# Patient Record
Sex: FEMALE | Race: OTHER | HISPANIC OR LATINO
[De-identification: names, ages, dates, MRNs, and addresses within clinical notes are randomized per-mention and may not be internally consistent; named-entity substitution may affect disease eponyms.]

---

## 2017-01-02 ENCOUNTER — RX RENEWAL (OUTPATIENT)
Age: 72
End: 2017-01-02

## 2017-01-06 ENCOUNTER — APPOINTMENT (OUTPATIENT)
Dept: INTERNAL MEDICINE | Facility: CLINIC | Age: 72
End: 2017-01-06

## 2017-02-03 ENCOUNTER — APPOINTMENT (OUTPATIENT)
Dept: INTERNAL MEDICINE | Facility: CLINIC | Age: 72
End: 2017-02-03

## 2017-02-03 VITALS
HEART RATE: 90 BPM | WEIGHT: 163 LBS | DIASTOLIC BLOOD PRESSURE: 80 MMHG | HEIGHT: 66 IN | SYSTOLIC BLOOD PRESSURE: 160 MMHG | BODY MASS INDEX: 26.2 KG/M2 | TEMPERATURE: 97.9 F

## 2017-02-03 VITALS — DIASTOLIC BLOOD PRESSURE: 70 MMHG | SYSTOLIC BLOOD PRESSURE: 150 MMHG

## 2017-02-03 LAB — HBA1C MFR BLD HPLC: 6.6

## 2017-02-06 LAB
CREAT SPEC-SCNC: 55 MG/DL
MICROALBUMIN 24H UR DL<=1MG/L-MCNC: 0.4 MG/DL
MICROALBUMIN/CREAT 24H UR-RTO: 7 UG/MG

## 2017-02-21 ENCOUNTER — MEDICATION RENEWAL (OUTPATIENT)
Age: 72
End: 2017-02-21

## 2017-04-10 ENCOUNTER — RX RENEWAL (OUTPATIENT)
Age: 72
End: 2017-04-10

## 2017-05-01 ENCOUNTER — RX RENEWAL (OUTPATIENT)
Age: 72
End: 2017-05-01

## 2017-05-01 ENCOUNTER — APPOINTMENT (OUTPATIENT)
Dept: INTERNAL MEDICINE | Facility: CLINIC | Age: 72
End: 2017-05-01

## 2017-05-12 ENCOUNTER — NON-APPOINTMENT (OUTPATIENT)
Age: 72
End: 2017-05-12

## 2017-05-12 ENCOUNTER — APPOINTMENT (OUTPATIENT)
Dept: INTERNAL MEDICINE | Facility: CLINIC | Age: 72
End: 2017-05-12

## 2017-05-12 VITALS
BODY MASS INDEX: 26.2 KG/M2 | HEIGHT: 66 IN | TEMPERATURE: 98 F | HEART RATE: 96 BPM | SYSTOLIC BLOOD PRESSURE: 170 MMHG | OXYGEN SATURATION: 97 % | WEIGHT: 163 LBS | DIASTOLIC BLOOD PRESSURE: 80 MMHG

## 2017-05-12 LAB — HBA1C MFR BLD HPLC: 8.1

## 2017-05-15 LAB
ALBUMIN SERPL ELPH-MCNC: 5 G/DL
ALP BLD-CCNC: 43 U/L
ALT SERPL-CCNC: 30 U/L
ANION GAP SERPL CALC-SCNC: 17 MMOL/L
AST SERPL-CCNC: 24 U/L
BASOPHILS # BLD AUTO: 0.01 K/UL
BASOPHILS NFR BLD AUTO: 0.2 %
BILIRUB SERPL-MCNC: 0.2 MG/DL
BUN SERPL-MCNC: 20 MG/DL
CALCIUM SERPL-MCNC: 10.4 MG/DL
CHLORIDE SERPL-SCNC: 98 MMOL/L
CO2 SERPL-SCNC: 25 MMOL/L
CREAT SERPL-MCNC: 0.88 MG/DL
EOSINOPHIL # BLD AUTO: 0.16 K/UL
EOSINOPHIL NFR BLD AUTO: 2.8 %
GLUCOSE SERPL-MCNC: 95 MG/DL
HCT VFR BLD CALC: 39.5 %
HGB BLD-MCNC: 12.7 G/DL
IMM GRANULOCYTES NFR BLD AUTO: 0.2 %
LYMPHOCYTES # BLD AUTO: 2.71 K/UL
LYMPHOCYTES NFR BLD AUTO: 46.6 %
MAN DIFF?: NORMAL
MCHC RBC-ENTMCNC: 25.6 PG
MCHC RBC-ENTMCNC: 32.2 GM/DL
MCV RBC AUTO: 79.5 FL
MONOCYTES # BLD AUTO: 0.55 K/UL
MONOCYTES NFR BLD AUTO: 9.5 %
NEUTROPHILS # BLD AUTO: 2.37 K/UL
NEUTROPHILS NFR BLD AUTO: 40.7 %
PLATELET # BLD AUTO: 313 K/UL
POTASSIUM SERPL-SCNC: 4.6 MMOL/L
PROT SERPL-MCNC: 8.4 G/DL
RBC # BLD: 4.97 M/UL
RBC # FLD: 15.9 %
SODIUM SERPL-SCNC: 140 MMOL/L
WBC # FLD AUTO: 5.81 K/UL

## 2017-06-12 ENCOUNTER — RX RENEWAL (OUTPATIENT)
Age: 72
End: 2017-06-12

## 2017-07-05 ENCOUNTER — RX RENEWAL (OUTPATIENT)
Age: 72
End: 2017-07-05

## 2017-07-14 ENCOUNTER — RESULT REVIEW (OUTPATIENT)
Age: 72
End: 2017-07-14

## 2017-07-14 ENCOUNTER — APPOINTMENT (OUTPATIENT)
Dept: OBGYN | Facility: CLINIC | Age: 72
End: 2017-07-14

## 2017-07-20 ENCOUNTER — FORM ENCOUNTER (OUTPATIENT)
Age: 72
End: 2017-07-20

## 2017-07-21 ENCOUNTER — OUTPATIENT (OUTPATIENT)
Dept: OUTPATIENT SERVICES | Facility: HOSPITAL | Age: 72
LOS: 1 days | End: 2017-07-21
Payer: MEDICARE

## 2017-07-21 ENCOUNTER — APPOINTMENT (OUTPATIENT)
Dept: ULTRASOUND IMAGING | Facility: IMAGING CENTER | Age: 72
End: 2017-07-21

## 2017-07-21 ENCOUNTER — APPOINTMENT (OUTPATIENT)
Dept: CARDIOLOGY | Facility: CLINIC | Age: 72
End: 2017-07-21

## 2017-07-21 ENCOUNTER — NON-APPOINTMENT (OUTPATIENT)
Age: 72
End: 2017-07-21

## 2017-07-21 ENCOUNTER — APPOINTMENT (OUTPATIENT)
Dept: MAMMOGRAPHY | Facility: IMAGING CENTER | Age: 72
End: 2017-07-21

## 2017-07-21 VITALS — OXYGEN SATURATION: 98 % | HEART RATE: 92 BPM | DIASTOLIC BLOOD PRESSURE: 86 MMHG | SYSTOLIC BLOOD PRESSURE: 152 MMHG

## 2017-07-21 DIAGNOSIS — Z00.8 ENCOUNTER FOR OTHER GENERAL EXAMINATION: ICD-10-CM

## 2017-07-21 PROCEDURE — 77063 BREAST TOMOSYNTHESIS BI: CPT

## 2017-07-21 PROCEDURE — 76641 ULTRASOUND BREAST COMPLETE: CPT

## 2017-07-21 PROCEDURE — 77067 SCR MAMMO BI INCL CAD: CPT

## 2017-08-11 ENCOUNTER — APPOINTMENT (OUTPATIENT)
Dept: CV DIAGNOSITCS | Facility: HOSPITAL | Age: 72
End: 2017-08-11

## 2017-08-11 ENCOUNTER — APPOINTMENT (OUTPATIENT)
Dept: CV DIAGNOSTICS | Facility: HOSPITAL | Age: 72
End: 2017-08-11

## 2017-08-11 ENCOUNTER — OUTPATIENT (OUTPATIENT)
Dept: OUTPATIENT SERVICES | Facility: HOSPITAL | Age: 72
LOS: 1 days | End: 2017-08-11
Payer: MEDICARE

## 2017-08-11 DIAGNOSIS — R06.02 SHORTNESS OF BREATH: ICD-10-CM

## 2017-08-11 PROCEDURE — A9500: CPT

## 2017-08-11 PROCEDURE — 93306 TTE W/DOPPLER COMPLETE: CPT

## 2017-08-11 PROCEDURE — 93017 CV STRESS TEST TRACING ONLY: CPT

## 2017-08-11 PROCEDURE — 78452 HT MUSCLE IMAGE SPECT MULT: CPT | Mod: 26

## 2017-08-11 PROCEDURE — 78452 HT MUSCLE IMAGE SPECT MULT: CPT

## 2017-08-11 PROCEDURE — 93016 CV STRESS TEST SUPVJ ONLY: CPT

## 2017-08-11 PROCEDURE — 93306 TTE W/DOPPLER COMPLETE: CPT | Mod: 26

## 2017-08-11 PROCEDURE — 93018 CV STRESS TEST I&R ONLY: CPT

## 2017-09-18 ENCOUNTER — RX RENEWAL (OUTPATIENT)
Age: 72
End: 2017-09-18

## 2017-09-29 ENCOUNTER — NON-APPOINTMENT (OUTPATIENT)
Age: 72
End: 2017-09-29

## 2017-09-29 ENCOUNTER — APPOINTMENT (OUTPATIENT)
Dept: CARDIOLOGY | Facility: CLINIC | Age: 72
End: 2017-09-29
Payer: MEDICARE

## 2017-09-29 VITALS
WEIGHT: 157 LBS | DIASTOLIC BLOOD PRESSURE: 89 MMHG | HEART RATE: 95 BPM | OXYGEN SATURATION: 96 % | BODY MASS INDEX: 25.23 KG/M2 | SYSTOLIC BLOOD PRESSURE: 168 MMHG | HEIGHT: 66 IN

## 2017-09-29 PROCEDURE — 99215 OFFICE O/P EST HI 40 MIN: CPT

## 2017-09-29 PROCEDURE — 93000 ELECTROCARDIOGRAM COMPLETE: CPT

## 2017-10-20 ENCOUNTER — APPOINTMENT (OUTPATIENT)
Dept: CARDIOLOGY | Facility: CLINIC | Age: 72
End: 2017-10-20

## 2017-10-24 ENCOUNTER — MEDICATION RENEWAL (OUTPATIENT)
Age: 72
End: 2017-10-24

## 2017-11-07 ENCOUNTER — RX RENEWAL (OUTPATIENT)
Age: 72
End: 2017-11-07

## 2017-11-24 ENCOUNTER — APPOINTMENT (OUTPATIENT)
Dept: INTERNAL MEDICINE | Facility: CLINIC | Age: 72
End: 2017-11-24
Payer: MEDICARE

## 2017-11-24 ENCOUNTER — LABORATORY RESULT (OUTPATIENT)
Age: 72
End: 2017-11-24

## 2017-11-24 VITALS
DIASTOLIC BLOOD PRESSURE: 70 MMHG | HEIGHT: 66 IN | WEIGHT: 158 LBS | BODY MASS INDEX: 25.39 KG/M2 | TEMPERATURE: 98 F | SYSTOLIC BLOOD PRESSURE: 158 MMHG

## 2017-11-24 DIAGNOSIS — Z87.898 PERSONAL HISTORY OF OTHER SPECIFIED CONDITIONS: ICD-10-CM

## 2017-11-24 DIAGNOSIS — M62.838 OTHER MUSCLE SPASM: ICD-10-CM

## 2017-11-24 DIAGNOSIS — R51 HEADACHE: ICD-10-CM

## 2017-11-24 LAB — HBA1C MFR BLD HPLC: 6.4

## 2017-11-24 PROCEDURE — 36415 COLL VENOUS BLD VENIPUNCTURE: CPT

## 2017-11-24 PROCEDURE — 90662 IIV NO PRSV INCREASED AG IM: CPT

## 2017-11-24 PROCEDURE — 83036 HEMOGLOBIN GLYCOSYLATED A1C: CPT | Mod: QW

## 2017-11-24 PROCEDURE — G0439: CPT

## 2017-11-24 PROCEDURE — G0008: CPT

## 2017-11-29 LAB
25(OH)D3 SERPL-MCNC: 24.8 NG/ML
APPEARANCE: CLEAR
BILIRUBIN URINE: NEGATIVE
BLOOD URINE: NEGATIVE
CHOLEST SERPL-MCNC: 164 MG/DL
CHOLEST/HDLC SERPL: 5.5 RATIO
COLOR: YELLOW
CREAT SPEC-SCNC: 53 MG/DL
GLUCOSE QUALITATIVE U: NEGATIVE MG/DL
HDLC SERPL-MCNC: 30 MG/DL
KETONES URINE: NEGATIVE
LDLC SERPL CALC-MCNC: 105 MG/DL
LEUKOCYTE ESTERASE URINE: NEGATIVE
MICROALBUMIN 24H UR DL<=1MG/L-MCNC: 0.4 MG/DL
MICROALBUMIN/CREAT 24H UR-RTO: 8 MG/G
NITRITE URINE: NEGATIVE
PH URINE: 5.5
PROTEIN URINE: NEGATIVE MG/DL
SPECIFIC GRAVITY URINE: 1.01
TRIGL SERPL-MCNC: 147 MG/DL
TSH SERPL-ACNC: 1.41 UIU/ML
UROBILINOGEN URINE: NEGATIVE MG/DL
VIT B12 SERPL-MCNC: 833 PG/ML

## 2017-12-08 LAB
ALBUMIN SERPL ELPH-MCNC: 4.7 G/DL
ALP BLD-CCNC: 33 U/L
ALT SERPL-CCNC: 21 U/L
ANION GAP SERPL CALC-SCNC: 22 MMOL/L
AST SERPL-CCNC: 19 U/L
BASOPHILS # BLD AUTO: 0.02 K/UL
BASOPHILS NFR BLD AUTO: 0.4 %
BILIRUB SERPL-MCNC: 0.2 MG/DL
BUN SERPL-MCNC: 20 MG/DL
CALCIUM SERPL-MCNC: 11.2 MG/DL
CHLORIDE SERPL-SCNC: 100 MMOL/L
CO2 SERPL-SCNC: 25 MMOL/L
CREAT SERPL-MCNC: 0.98 MG/DL
EOSINOPHIL # BLD AUTO: 0.2 K/UL
EOSINOPHIL NFR BLD AUTO: 3.8 %
GLUCOSE SERPL-MCNC: 76 MG/DL
HCT VFR BLD CALC: 37.5 %
HGB BLD-MCNC: 12.6 G/DL
IMM GRANULOCYTES NFR BLD AUTO: 0.2 %
LYMPHOCYTES # BLD AUTO: 2.24 K/UL
LYMPHOCYTES NFR BLD AUTO: 42.7 %
MAN DIFF?: NORMAL
MCHC RBC-ENTMCNC: 26.8 PG
MCHC RBC-ENTMCNC: 33.6 GM/DL
MCV RBC AUTO: 79.6 FL
MONOCYTES # BLD AUTO: 0.57 K/UL
MONOCYTES NFR BLD AUTO: 10.9 %
NEUTROPHILS # BLD AUTO: 2.2 K/UL
NEUTROPHILS NFR BLD AUTO: 42 %
PLATELET # BLD AUTO: 309 K/UL
POTASSIUM SERPL-SCNC: 4.5 MMOL/L
PROT SERPL-MCNC: 8.3 G/DL
RBC # BLD: 4.71 M/UL
RBC # FLD: 15.4 %
SODIUM SERPL-SCNC: 142 MMOL/L
WBC # FLD AUTO: 5.24 K/UL

## 2017-12-18 ENCOUNTER — RX RENEWAL (OUTPATIENT)
Age: 72
End: 2017-12-18

## 2018-01-02 ENCOUNTER — RX RENEWAL (OUTPATIENT)
Age: 73
End: 2018-01-02

## 2018-01-10 ENCOUNTER — RX RENEWAL (OUTPATIENT)
Age: 73
End: 2018-01-10

## 2018-03-19 ENCOUNTER — LABORATORY RESULT (OUTPATIENT)
Age: 73
End: 2018-03-19

## 2018-03-19 ENCOUNTER — APPOINTMENT (OUTPATIENT)
Dept: INTERNAL MEDICINE | Facility: CLINIC | Age: 73
End: 2018-03-19
Payer: MEDICARE

## 2018-03-19 VITALS
OXYGEN SATURATION: 98 % | BODY MASS INDEX: 25.39 KG/M2 | DIASTOLIC BLOOD PRESSURE: 70 MMHG | WEIGHT: 158 LBS | HEART RATE: 91 BPM | TEMPERATURE: 97.6 F | SYSTOLIC BLOOD PRESSURE: 130 MMHG | HEIGHT: 66 IN

## 2018-03-19 LAB — HBA1C MFR BLD HPLC: 6.2

## 2018-03-19 PROCEDURE — 83036 HEMOGLOBIN GLYCOSYLATED A1C: CPT | Mod: QW

## 2018-03-19 PROCEDURE — 99214 OFFICE O/P EST MOD 30 MIN: CPT | Mod: 25

## 2018-03-19 PROCEDURE — 36415 COLL VENOUS BLD VENIPUNCTURE: CPT

## 2018-03-22 LAB
24R-OH-CALCIDIOL SERPL-MCNC: 30.5 PG/ML
25(OH)D3 SERPL-MCNC: 21.7 NG/ML
ANION GAP SERPL CALC-SCNC: 21 MMOL/L
BUN SERPL-MCNC: 15 MG/DL
CA-I SERPL-SCNC: 1.32 MMOL/L
CALCIUM SERPL-MCNC: 11.5 MG/DL
CALCIUM SERPL-MCNC: 11.5 MG/DL
CHLORIDE SERPL-SCNC: 98 MMOL/L
CO2 SERPL-SCNC: 23 MMOL/L
CREAT SERPL-MCNC: 0.95 MG/DL
CREAT SPEC-SCNC: 44 MG/DL
GLUCOSE SERPL-MCNC: 67 MG/DL
MICROALBUMIN 24H UR DL<=1MG/L-MCNC: 0.7 MG/DL
MICROALBUMIN/CREAT 24H UR-RTO: 16 MG/G
PARATHYROID HORMONE INTACT: 28 PG/ML
POTASSIUM SERPL-SCNC: 4.7 MMOL/L
SODIUM SERPL-SCNC: 142 MMOL/L

## 2018-03-23 LAB — PTH RELATED PROT SERPL-MCNC: <1.1 PMOL/L

## 2018-04-10 ENCOUNTER — FORM ENCOUNTER (OUTPATIENT)
Age: 73
End: 2018-04-10

## 2018-04-11 ENCOUNTER — APPOINTMENT (OUTPATIENT)
Dept: RADIOLOGY | Facility: IMAGING CENTER | Age: 73
End: 2018-04-11
Payer: MEDICARE

## 2018-04-11 ENCOUNTER — OUTPATIENT (OUTPATIENT)
Dept: OUTPATIENT SERVICES | Facility: HOSPITAL | Age: 73
LOS: 1 days | End: 2018-04-11
Payer: MEDICARE

## 2018-04-11 DIAGNOSIS — E83.52 HYPERCALCEMIA: ICD-10-CM

## 2018-04-11 PROCEDURE — 77080 DXA BONE DENSITY AXIAL: CPT

## 2018-04-11 PROCEDURE — 77080 DXA BONE DENSITY AXIAL: CPT | Mod: 26

## 2018-05-21 ENCOUNTER — RX RENEWAL (OUTPATIENT)
Age: 73
End: 2018-05-21

## 2018-05-25 ENCOUNTER — RX RENEWAL (OUTPATIENT)
Age: 73
End: 2018-05-25

## 2018-06-18 ENCOUNTER — APPOINTMENT (OUTPATIENT)
Dept: INTERNAL MEDICINE | Facility: CLINIC | Age: 73
End: 2018-06-18
Payer: MEDICARE

## 2018-06-18 VITALS
OXYGEN SATURATION: 96 % | BODY MASS INDEX: 25.71 KG/M2 | SYSTOLIC BLOOD PRESSURE: 154 MMHG | WEIGHT: 160 LBS | HEIGHT: 66 IN | HEART RATE: 85 BPM | TEMPERATURE: 97.6 F | DIASTOLIC BLOOD PRESSURE: 74 MMHG

## 2018-06-18 PROCEDURE — 99214 OFFICE O/P EST MOD 30 MIN: CPT | Mod: 25

## 2018-06-18 PROCEDURE — 36415 COLL VENOUS BLD VENIPUNCTURE: CPT

## 2018-06-18 NOTE — HISTORY OF PRESENT ILLNESS
[FreeTextEntry1] : \par DM- doing better \par -checking readings   , no hypoglycemic episode , Ophth visit was 2017 , no retinopathy .On Lantus 22 units with Po medications. compliant with diet and medications.\par -walking in house , wants to but needs  to drive her to park where she can walk , she likes going out to park feels low mood walking in her neighborhood , has been eating a lot staying home \par \par Hyperlipidemia- on atorvastatin denies myalgias.\par \par Hypertension- on current medications . off HCTZ 3/2018 to check BP today \par \par H/o hypercalcemia- monitoring calcium intake , stopped Po ca supplements last ca was normal \par - off hCTZ \par

## 2018-06-18 NOTE — ASSESSMENT
[FreeTextEntry1] : hypercalcemia- \par -hctz was discontinued last visit \par repeat CA ++, intact PTH \par -bone density ordered \par -hydration advised \par \par Diabetes type 2 , on insulin and oral hypoglycemic -was noncompliant with diet , poc HGBAIC 3/2018 6.2\par - get AIC \par -  diet low in carbs, , continue current medications. Monitor Accu-Cheks daily, ophthalmology- due will schedule apt , Monitor for signs of hypoglycemia. \par d/w patient to eat a low carbohydrate diet Continue 1800 kcal ADA diet, avoid rice, pasta, sugar, sweet, soda, juices,start exercise daily 30 minutes \par \par Hypertension- 160/84\par -HCTZ was discontinued last 3/2018 \par check lytes \par -change enalapril 10 2 tab in am and 1 in pm \par - cardio consult reviewed - stress test negative  Controlled, continue current medications, low sodium-DASH diet.\par -f/u 3 months check BP \par \par h/o hep c s/p interferon rx , RNA negative \par \par Hyperlipidemia\par d/c fibric acid , advised low carb diet , avoid fried foods, continue current medications,\par Low-fat diet, avoid red meat, cheese, butter, peanuts and exercise daily for 40 minutes.\par \par Health maintenance\par flu vaccine given 2017\par colonoscope -8/2014 , \par Mammo - 7/2017 \par Pap - 7/2017 \par Tetanus vaccine -2013\par Pneumovax -2011, Prevnar 13 2/2015 , Pneumovax 23 given 2016 \par dermatology - 2015 for skin check \par zoster vaccine offered pt will check with insurance company and make appointment. \par bone density ordered

## 2018-06-18 NOTE — REVIEW OF SYSTEMS
[Negative] : Gastrointestinal [Chest Pain] : no chest pain [Palpitations] : no palpitations [Lower Ext Edema] : no lower extremity edema

## 2018-06-18 NOTE — DATA REVIEWED
[FreeTextEntry1] : 3/2018\par Microalbumin/Creatinine Ratio 16 mg/g \par Calcium, Serum 11.5 mg/dL \par Intact PTH 28 pg/mL \par  POCT Hemoglobin A1C 6.2

## 2018-06-19 LAB
CA-I SERPL-SCNC: 1.31 MMOL/L
CALCIUM SERPL-MCNC: 9.8 MG/DL
CALCIUM SERPL-MCNC: 9.8 MG/DL
CHOLEST SERPL-MCNC: 146 MG/DL
CHOLEST/HDLC SERPL: 4.3 RATIO
CREAT SPEC-SCNC: 83 MG/DL
HBA1C MFR BLD HPLC: 5.8 %
HDLC SERPL-MCNC: 34 MG/DL
LDLC SERPL CALC-MCNC: 83 MG/DL
MICROALBUMIN 24H UR DL<=1MG/L-MCNC: 3 MG/DL
MICROALBUMIN/CREAT 24H UR-RTO: 36 MG/G
PARATHYROID HORMONE INTACT: 43 PG/ML
TRIGL SERPL-MCNC: 147 MG/DL

## 2018-08-31 ENCOUNTER — RX RENEWAL (OUTPATIENT)
Age: 73
End: 2018-08-31

## 2018-09-11 ENCOUNTER — FORM ENCOUNTER (OUTPATIENT)
Age: 73
End: 2018-09-11

## 2018-09-12 ENCOUNTER — OUTPATIENT (OUTPATIENT)
Dept: OUTPATIENT SERVICES | Facility: HOSPITAL | Age: 73
LOS: 1 days | End: 2018-09-12
Payer: MEDICARE

## 2018-09-12 ENCOUNTER — APPOINTMENT (OUTPATIENT)
Dept: ULTRASOUND IMAGING | Facility: IMAGING CENTER | Age: 73
End: 2018-09-12
Payer: MEDICARE

## 2018-09-12 ENCOUNTER — APPOINTMENT (OUTPATIENT)
Dept: MAMMOGRAPHY | Facility: IMAGING CENTER | Age: 73
End: 2018-09-12
Payer: MEDICARE

## 2018-09-12 DIAGNOSIS — Z00.8 ENCOUNTER FOR OTHER GENERAL EXAMINATION: ICD-10-CM

## 2018-09-12 PROCEDURE — 77063 BREAST TOMOSYNTHESIS BI: CPT | Mod: 26

## 2018-09-12 PROCEDURE — 76641 ULTRASOUND BREAST COMPLETE: CPT | Mod: 26,50

## 2018-09-12 PROCEDURE — 77067 SCR MAMMO BI INCL CAD: CPT | Mod: 26

## 2018-09-12 PROCEDURE — 77067 SCR MAMMO BI INCL CAD: CPT

## 2018-09-12 PROCEDURE — 77063 BREAST TOMOSYNTHESIS BI: CPT

## 2018-09-12 PROCEDURE — 76641 ULTRASOUND BREAST COMPLETE: CPT

## 2018-09-17 ENCOUNTER — APPOINTMENT (OUTPATIENT)
Dept: INTERNAL MEDICINE | Facility: CLINIC | Age: 73
End: 2018-09-17
Payer: MEDICARE

## 2018-09-17 VITALS
DIASTOLIC BLOOD PRESSURE: 80 MMHG | SYSTOLIC BLOOD PRESSURE: 170 MMHG | BODY MASS INDEX: 25.71 KG/M2 | HEART RATE: 87 BPM | WEIGHT: 160 LBS | HEIGHT: 66 IN | TEMPERATURE: 98.1 F | OXYGEN SATURATION: 98 %

## 2018-09-17 LAB — HBA1C MFR BLD HPLC: 6.5

## 2018-09-17 PROCEDURE — G0008: CPT

## 2018-09-17 PROCEDURE — 83036 HEMOGLOBIN GLYCOSYLATED A1C: CPT | Mod: QW

## 2018-09-17 PROCEDURE — 99214 OFFICE O/P EST MOD 30 MIN: CPT | Mod: 25

## 2018-09-17 PROCEDURE — 90662 IIV NO PRSV INCREASED AG IM: CPT

## 2018-09-17 RX ORDER — CLOTRIMAZOLE AND BETAMETHASONE DIPROPIONATE 10; .5 MG/G; MG/G
1-0.05 CREAM TOPICAL TWICE DAILY
Qty: 1 | Refills: 0 | Status: DISCONTINUED | COMMUNITY
Start: 2018-09-17 | End: 2018-09-17

## 2018-09-17 NOTE — HISTORY OF PRESENT ILLNESS
[Spouse] : spouse [FreeTextEntry1] : \par DM- doing better \par -checking readings   , no hypoglycemic episode , Ophth visit was 8/2018  , no retinopathy .On Lantus 22 units with Po medications. compliant with diet and medications.\par -walking in house , wants to but needs  to drive her to park where she can walk , she likes going out to park feels low mood walking in her neighborhood , has been eating a lot staying home \par \par Hyperlipidemia- on atorvastatin denies myalgias.\par \par Hypertension- on current medications . off HCTZ 3/2018 to check BP today \par \par H/o hypercalcemia- monitoring calcium intake , stopped Po ca supplements last ca was normal \par - off hCTZ \par

## 2018-09-17 NOTE — ASSESSMENT
[FreeTextEntry1] : hypercalcemia- \par -hctz was discontinued last visit \par repeat CA ++, intact PTH stable \par -bone density 4/2018 nl \par -hydration advised \par \par Diabetes type 2 , on insulin and oral hypoglycemic -was noncompliant with diet , poc HGBAIC today- 6.5\par -  diet low in carbs, , continue current medications. Monitor Accu-Cheks daily, ophthalmology- due will schedule apt , Monitor for signs of hypoglycemia. \par d/w patient to eat a low carbohydrate diet Continue 1800 kcal ADA diet, avoid rice, pasta, sugar, sweet, soda, juices,start exercise daily 30 minutes \par \par Hypertension- 160/84- still elevated - did not cahneg medication as requested last visit \par - change to enalapril 20 po bid \par -HCTZ was discontinued last 3/2018 \par - cardio consult reviewed - stress test negative  Controlled, continue current medications, low sodium-DASH diet.\par -f/u 3 months check BP \par \par h/o hep c s/p interferon rx , RNA negative \par \par Hyperlipidemia\par d/c fibric acid , advised low carb diet , avoid fried foods, continue current medications,\par Low-fat diet, avoid red meat, cheese, butter, peanuts and exercise daily for 40 minutes.\par \par Health maintenance\par flu vaccine given today \par colonoscope -8/2014 , \par Mammo - 9/2018 \par Pap - 7/2017 \par Tetanus vaccine -2013\par Pneumovax -2011, Prevnar 13 2/2015 , Pneumovax 23 given 2016 \par dermatology - 2015 for skin check \par zoster vaccine offered pt will check with insurance company and make appointment. \par bone density 4/2018 normal

## 2018-09-18 LAB
CREAT SPEC-SCNC: 37 MG/DL
MICROALBUMIN 24H UR DL<=1MG/L-MCNC: 3.7 MG/DL
MICROALBUMIN/CREAT 24H UR-RTO: 101 MG/G

## 2018-10-18 ENCOUNTER — RX RENEWAL (OUTPATIENT)
Age: 73
End: 2018-10-18

## 2018-10-19 ENCOUNTER — RX RENEWAL (OUTPATIENT)
Age: 73
End: 2018-10-19

## 2018-11-06 ENCOUNTER — APPOINTMENT (OUTPATIENT)
Dept: DERMATOLOGY | Facility: CLINIC | Age: 73
End: 2018-11-06
Payer: MEDICARE

## 2018-11-06 VITALS
SYSTOLIC BLOOD PRESSURE: 140 MMHG | DIASTOLIC BLOOD PRESSURE: 84 MMHG | BODY MASS INDEX: 25.71 KG/M2 | WEIGHT: 160 LBS | HEIGHT: 66 IN

## 2018-11-06 PROCEDURE — 99203 OFFICE O/P NEW LOW 30 MIN: CPT

## 2018-11-25 ENCOUNTER — RX RENEWAL (OUTPATIENT)
Age: 73
End: 2018-11-25

## 2018-12-11 ENCOUNTER — RX RENEWAL (OUTPATIENT)
Age: 73
End: 2018-12-11

## 2018-12-17 ENCOUNTER — APPOINTMENT (OUTPATIENT)
Dept: INTERNAL MEDICINE | Facility: CLINIC | Age: 73
End: 2018-12-17
Payer: MEDICARE

## 2018-12-17 ENCOUNTER — RX RENEWAL (OUTPATIENT)
Age: 73
End: 2018-12-17

## 2018-12-17 VITALS
BODY MASS INDEX: 25.71 KG/M2 | OXYGEN SATURATION: 97 % | TEMPERATURE: 99.2 F | SYSTOLIC BLOOD PRESSURE: 160 MMHG | HEART RATE: 104 BPM | HEIGHT: 66 IN | DIASTOLIC BLOOD PRESSURE: 74 MMHG | WEIGHT: 160 LBS

## 2018-12-17 PROCEDURE — G0439: CPT

## 2018-12-17 PROCEDURE — G0444 DEPRESSION SCREEN ANNUAL: CPT | Mod: 59

## 2018-12-17 RX ORDER — ENALAPRIL MALEATE 10 MG/1
10 TABLET ORAL TWICE DAILY
Qty: 180 | Refills: 0 | Status: DISCONTINUED | COMMUNITY
Start: 2017-11-24 | End: 2018-12-17

## 2018-12-17 NOTE — ASSESSMENT
[FreeTextEntry1] : URTI- getting better on Doxy \par -increase fluids intake \par -  flonase 1 spray each nostril twice daily x 1week \par -xyzal 5 mg at bed time - if not covered take zyrtec 10 qhs \par - rtc if no improvement or go to ER\par \par hypercalcemia- \par repeat CA ++, intact PTH stable \par -bone density 4/2018 nl \par -hydration advised \par \par Diabetes type 2 , on insulin and oral hypoglycemic -was noncompliant with diet , poc HGBAIC today- 6.5\par -  diet low in carbs, , continue current medications. Monitor Accu-Cheks daily, ophthalmology- due will schedule apt , Monitor for signs of hypoglycemia. \par d/w patient to eat a low carbohydrate diet Continue 1800 kcal ADA diet, avoid rice, pasta, sugar, sweet, soda, juices,start exercise daily 30 minutes \par \par Hypertension- 160/84- still elevated -changed medication as requested last visit \par - on enalapril 20 po bid \par -HCTZ was discontinued last 3/2018 \par - cardio consult reviewed - stress test negative  Controlled, continue current medications, low sodium-DASH diet.\par -f/u 1 months check BP \par \par h/o hep c s/p interferon rx , RNA negative \par \par Hyperlipidemia\par d/c fibric acid , advised low carb diet , avoid fried foods, continue current medications,\par Low-fat diet, avoid red meat, cheese, butter, peanuts and exercise daily for 40 minutes.\par \par Health maintenance\par flu vaccine given 9/2018\par colonoscope -8/2014 , \par Mammo - 9/2018 \par Pap - 7/2017 \par Tetanus vaccine -2013\par Pneumovax -2011, Prevnar 13 2/2015 , Pneumovax 23 given 2016 \par dermatology - 2015 for skin check \par zoster vaccine offered pt will check with insurance company and make appointment. \par bone density 4/2018 normal

## 2018-12-17 NOTE — HEALTH RISK ASSESSMENT
[Good] : ~his/her~  mood as  good [No falls in past year] : Patient reported no falls in the past year [0] : 2) Feeling down, depressed, or hopeless: Not at all (0) [None] : None [With Significant Other] : lives with significant other [Unemployed] : unemployed [] :  [Feels Safe at Home] : Feels safe at home [Fully functional (bathing, dressing, toileting, transferring, walking, feeding)] : Fully functional (bathing, dressing, toileting, transferring, walking, feeding) [Fully functional (using the telephone, shopping, preparing meals, housekeeping, doing laundry, using] : Fully functional and needs no help or supervision to perform IADLs (using the telephone, shopping, preparing meals, housekeeping, doing laundry, using transportation, managing medications and managing finances) [] : No [[id number]] : 0 [Reports changes in hearing] : Reports no changes in hearing [Reports changes in vision] : Reports no changes in vision [Reports changes in dental health] : Reports no changes in dental health [HepatitisCDate] : 6/27/2015  [HepatitisCComments] : + hep C

## 2018-12-17 NOTE — HISTORY OF PRESENT ILLNESS
[Spouse] : spouse [de-identified] : came back from [place] 10 day ago was sick with URTI coughing - [facility] was dx as [facility]- rx with flonase and doxycycline 1 more day left feeling better cough still present \par  [FreeTextEntry1] : \par DM- doing better \par -checking readings   , no hypoglycemic episode , Ophth visit was 8/2018  , no retinopathy .On Lantus 22 units with Po medications. compliant with diet and medications.\par -walking in house , wants to but needs  to drive her to park where she can walk , she likes going out to park feels low mood walking in her neighborhood , has been eating a lot staying home \par \par Hyperlipidemia- on atorvastatin denies myalgias.\par \par Hypertension- on current medications enalapril 20 BID  . off HCTZ 3/2018 to check BP today \par \par H/o hypercalcemia- monitoring calcium intake , stopped Po ca supplements last ca was normal \par - off hCTZ \par

## 2019-01-17 ENCOUNTER — MEDICATION RENEWAL (OUTPATIENT)
Age: 74
End: 2019-01-17

## 2019-01-28 ENCOUNTER — MEDICATION RENEWAL (OUTPATIENT)
Age: 74
End: 2019-01-28

## 2019-02-04 ENCOUNTER — MEDICATION RENEWAL (OUTPATIENT)
Age: 74
End: 2019-02-04

## 2019-02-04 ENCOUNTER — APPOINTMENT (OUTPATIENT)
Dept: INTERNAL MEDICINE | Facility: CLINIC | Age: 74
End: 2019-02-04

## 2019-02-19 LAB
25(OH)D3 SERPL-MCNC: 30.6 NG/ML
AFP-TM SERPL-MCNC: 2.8 NG/ML
ALBUMIN SERPL ELPH-MCNC: 4.5 G/DL
ALP BLD-CCNC: 39 U/L
ALT SERPL-CCNC: 16 U/L
ANION GAP SERPL CALC-SCNC: 9 MMOL/L
APPEARANCE: CLEAR
AST SERPL-CCNC: 24 U/L
BASOPHILS # BLD AUTO: 0.01 K/UL
BASOPHILS NFR BLD AUTO: 0.2 %
BILIRUB SERPL-MCNC: 0.4 MG/DL
BILIRUBIN URINE: NEGATIVE
BLOOD URINE: NEGATIVE
BUN SERPL-MCNC: 9 MG/DL
CALCIUM SERPL-MCNC: 9.6 MG/DL
CALCIUM SERPL-MCNC: 9.6 MG/DL
CHLORIDE SERPL-SCNC: 104 MMOL/L
CHOLEST SERPL-MCNC: 142 MG/DL
CHOLEST/HDLC SERPL: 4.3 RATIO
CO2 SERPL-SCNC: 27 MMOL/L
COLOR: YELLOW
CREAT SERPL-MCNC: 0.73 MG/DL
CREAT SPEC-SCNC: 45 MG/DL
EOSINOPHIL # BLD AUTO: 0.12 K/UL
EOSINOPHIL NFR BLD AUTO: 2.6 %
ESTIMATED AVERAGE GLUCOSE: 123 MG/DL
GLUCOSE QUALITATIVE U: NEGATIVE MG/DL
GLUCOSE SERPL-MCNC: 73 MG/DL
HBA1C MFR BLD HPLC: 5.9 %
HCT VFR BLD CALC: 37 %
HDLC SERPL-MCNC: 33 MG/DL
HGB BLD-MCNC: 11.6 G/DL
IMM GRANULOCYTES NFR BLD AUTO: 0 %
KETONES URINE: NEGATIVE
LDLC SERPL CALC-MCNC: 87 MG/DL
LEUKOCYTE ESTERASE URINE: NEGATIVE
LYMPHOCYTES # BLD AUTO: 1.96 K/UL
LYMPHOCYTES NFR BLD AUTO: 42.2 %
MAN DIFF?: NORMAL
MCHC RBC-ENTMCNC: 24.4 PG
MCHC RBC-ENTMCNC: 31.4 GM/DL
MCV RBC AUTO: 77.9 FL
MICROALBUMIN 24H UR DL<=1MG/L-MCNC: <1.2 MG/DL
MICROALBUMIN/CREAT 24H UR-RTO: NORMAL
MONOCYTES # BLD AUTO: 0.47 K/UL
MONOCYTES NFR BLD AUTO: 10.1 %
NEUTROPHILS # BLD AUTO: 2.09 K/UL
NEUTROPHILS NFR BLD AUTO: 44.9 %
NITRITE URINE: NEGATIVE
PARATHYROID HORMONE INTACT: 52 PG/ML
PH URINE: 7
PLATELET # BLD AUTO: 315 K/UL
POTASSIUM SERPL-SCNC: 3.9 MMOL/L
PROT SERPL-MCNC: 7.5 G/DL
PROTEIN URINE: NEGATIVE MG/DL
RBC # BLD: 4.75 M/UL
RBC # FLD: 16.9 %
SODIUM SERPL-SCNC: 140 MMOL/L
SPECIFIC GRAVITY URINE: 1.01
TRIGL SERPL-MCNC: 108 MG/DL
TSH SERPL-ACNC: 3.08 UIU/ML
UROBILINOGEN URINE: NEGATIVE MG/DL
VIT B12 SERPL-MCNC: 460 PG/ML
WBC # FLD AUTO: 4.65 K/UL

## 2019-03-06 ENCOUNTER — APPOINTMENT (OUTPATIENT)
Dept: INTERNAL MEDICINE | Facility: CLINIC | Age: 74
End: 2019-03-06
Payer: MEDICARE

## 2019-03-06 VITALS
HEART RATE: 93 BPM | DIASTOLIC BLOOD PRESSURE: 80 MMHG | OXYGEN SATURATION: 98 % | BODY MASS INDEX: 25.39 KG/M2 | TEMPERATURE: 98.1 F | HEIGHT: 66 IN | SYSTOLIC BLOOD PRESSURE: 150 MMHG | WEIGHT: 158 LBS

## 2019-03-06 DIAGNOSIS — J06.9 ACUTE UPPER RESPIRATORY INFECTION, UNSPECIFIED: ICD-10-CM

## 2019-03-06 DIAGNOSIS — R26.89 OTHER ABNORMALITIES OF GAIT AND MOBILITY: ICD-10-CM

## 2019-03-06 DIAGNOSIS — R21 RASH AND OTHER NONSPECIFIC SKIN ERUPTION: ICD-10-CM

## 2019-03-06 PROCEDURE — 99214 OFFICE O/P EST MOD 30 MIN: CPT

## 2019-03-06 NOTE — ASSESSMENT
[FreeTextEntry1] : \par hx hypercalcemia- \par repeat CA ++, intact PTH stable \par -bone density 4/2018 nl \par -hydration advised \par \par Diabetes type 2 , on insulin and oral hypoglycemic -AIC was 5.9 2/2019 better \par -  diet low in carbs, , continue current medications. Monitor Accu-Cheks daily, ophthalmology- due will schedule apt , Monitor for signs of hypoglycemia. \par d/w patient to eat a low carbohydrate diet Continue 1800 kcal ADA diet, avoid rice, pasta, sugar, sweet, soda, juices,start exercise daily 30 minutes \par \par Hypertension- 160/84- still elevated slight improvement \par - on enalapril 20 po bid \par -HCTZ was discontinued last 3/2018 \par - cardio consult reviewed - stress test negative  Controlled, continue current medications, low sodium-DASH diet.\par -f/u 3 months check bp \par \par h/o hep c s/p interferon rx , RNA negative - AFP neg \par \par Hyperlipidemia\par d/c fibric acid , advised low carb diet , avoid fried foods, continue current medications,\par Low-fat diet, avoid red meat, cheese, butter, peanuts and exercise daily for 40 minutes.\par \par Health maintenance\par flu vaccine given 9/2018\par colonoscope -8/2014 , \par Mammo - 9/2018 \par Pap - 7/2017 \par Tetanus vaccine -2013\par Pneumovax -2011, Prevnar 13 2/2015 , Pneumovax 23 given 2016 \par dermatology - 2015 for skin check \par zoster vaccine offered pt will check with insurance company and make appointment. \par bone density 4/2018 normal

## 2019-03-06 NOTE — HISTORY OF PRESENT ILLNESS
[Spouse] : spouse [de-identified] : f/u on  medical conditions  [FreeTextEntry1] : \par DM- doing better \par -checking readings   , no hypoglycemic episode , Ophth visit was 8/2018  , no retinopathy .On Lantus 22 units with Po medications. compliant with diet and medications.\par - eliminated carbs in diet \par -walking in house , wants to but needs  to drive her to park where she can walk , she likes going out to park feels low mood walking in her neighborhood , has been eating a lot staying home \par \par Hyperlipidemia- on atorvastatin denies myalgias.\par \par Hypertension- on current medications enalapril 20 BID  . off HCTZ 3/2018 to check BP today \par \par H/o hypercalcemia- monitoring calcium intake , stopped Po ca supplements last ca was normal \par - off hCTZ \par

## 2019-04-01 ENCOUNTER — APPOINTMENT (OUTPATIENT)
Dept: DERMATOLOGY | Facility: CLINIC | Age: 74
End: 2019-04-01

## 2019-05-06 ENCOUNTER — APPOINTMENT (OUTPATIENT)
Age: 74
End: 2019-05-06
Payer: MEDICARE

## 2019-05-06 PROCEDURE — 99213 OFFICE O/P EST LOW 20 MIN: CPT

## 2019-06-17 ENCOUNTER — APPOINTMENT (OUTPATIENT)
Dept: INTERNAL MEDICINE | Facility: CLINIC | Age: 74
End: 2019-06-17

## 2019-07-02 ENCOUNTER — RX RENEWAL (OUTPATIENT)
Age: 74
End: 2019-07-02

## 2019-07-03 ENCOUNTER — APPOINTMENT (OUTPATIENT)
Dept: INTERNAL MEDICINE | Facility: CLINIC | Age: 74
End: 2019-07-03
Payer: MEDICARE

## 2019-07-03 VITALS
DIASTOLIC BLOOD PRESSURE: 80 MMHG | HEIGHT: 66 IN | TEMPERATURE: 98.2 F | HEART RATE: 90 BPM | SYSTOLIC BLOOD PRESSURE: 140 MMHG | WEIGHT: 154 LBS | BODY MASS INDEX: 24.75 KG/M2 | OXYGEN SATURATION: 98 %

## 2019-07-03 LAB — HBA1C MFR BLD HPLC: 4.6

## 2019-07-03 PROCEDURE — 83036 HEMOGLOBIN GLYCOSYLATED A1C: CPT | Mod: QW

## 2019-07-03 PROCEDURE — 99214 OFFICE O/P EST MOD 30 MIN: CPT | Mod: 25

## 2019-07-03 PROCEDURE — 36415 COLL VENOUS BLD VENIPUNCTURE: CPT

## 2019-07-03 NOTE — HISTORY OF PRESENT ILLNESS
[FreeTextEntry1] : \par DM- doing better \par -checking readings   - occ reading 220 when she was non comp with diet , no hypoglycemic episode , Ophth visit was 8/2018 will schedule appt , no retinopathy .On Lantus 22 units with Po medications. compliant with diet and medications.\par - eliminated carbs in diet \par -walking in house , wants to but needs  to drive her to park where she can walk , she likes going out to park feels low mood walking in her neighborhood , has been eating a lot staying home \par \par Hyperlipidemia- on atorvastatin denies myalgias.\par \par Hypertension- on current medications enalapril 20 BID  . off HCTZ 3/2018 to check BP today \par \par H/o hypercalcemia- monitoring calcium intake , stopped Po ca supplements last ca was normal \par - off hCTZ \par

## 2019-07-03 NOTE — ASSESSMENT
[FreeTextEntry1] : \par hx hypercalcemia- \par repeat CA ++, intact PTH stable \par -bone density 4/2018 nl \par -hydration advised \par -off HCTZ since 2018 \par \par Diabetes type 2 , on insulin and oral hypoglycemic \par -poc - 4.6 error - will get venous draw\par -AIC was 5.9 2/2019 better \par -  diet low in carbs, , continue current medications. Monitor Accu-Cheks daily, ophthalmology- due will schedule apt , Monitor for signs of hypoglycemia. \par d/w patient to eat a low carbohydrate diet Continue 1800 kcal ADA diet, avoid rice, pasta, sugar, sweet, soda, juices,start exercise daily 30 minutes \par \par Hypertension- 160/84- still elevated\par - on enalapril 20 po bid add amlodipine -5 mg daily today \par -HCTZ was discontinued last 3/2018 \par - cardio consult reviewed - stress test negative  Controlled, continue current medications, low sodium-DASH diet.\par -f/u 3 months check bp \par \par h/o hep c s/p interferon rx , RNA negative - AFP neg \par \par Hyperlipidemia\par d/c fibric acid , advised low carb diet , avoid fried foods, continue current medications,\par Low-fat diet, avoid red meat, cheese, butter, peanuts and exercise daily for 40 minutes.\par \par Health maintenance\par flu vaccine given 9/2018\par colonoscope -8/2014 , due 5 yrs as per pt referral given \par Mammo - 9/2018 \par Pap - 7/2017 \par Tetanus vaccine -2013\par Pneumovax -2011, Prevnar 13 2/2015 , Pneumovax 23 given 2016 \par dermatology -5/2019  for skin check \par zoster vaccine offered pt will check with insurance company and make appointment. \par bone density 4/2018 normal

## 2019-07-05 LAB
ESTIMATED AVERAGE GLUCOSE: 117 MG/DL
HBA1C MFR BLD HPLC: 5.7 %

## 2019-07-11 ENCOUNTER — RX RENEWAL (OUTPATIENT)
Age: 74
End: 2019-07-11

## 2019-07-29 ENCOUNTER — RX RENEWAL (OUTPATIENT)
Age: 74
End: 2019-07-29

## 2019-07-31 ENCOUNTER — RX RENEWAL (OUTPATIENT)
Age: 74
End: 2019-07-31

## 2019-11-01 ENCOUNTER — APPOINTMENT (OUTPATIENT)
Dept: INTERNAL MEDICINE | Facility: CLINIC | Age: 74
End: 2019-11-01
Payer: MEDICARE

## 2019-11-01 VITALS
HEART RATE: 111 BPM | BODY MASS INDEX: 24.75 KG/M2 | HEIGHT: 66 IN | TEMPERATURE: 98.2 F | SYSTOLIC BLOOD PRESSURE: 160 MMHG | WEIGHT: 154 LBS | DIASTOLIC BLOOD PRESSURE: 70 MMHG | OXYGEN SATURATION: 98 %

## 2019-11-01 LAB — HBA1C MFR BLD HPLC: 6

## 2019-11-01 PROCEDURE — 83036 HEMOGLOBIN GLYCOSYLATED A1C: CPT | Mod: QW

## 2019-11-01 PROCEDURE — 90662 IIV NO PRSV INCREASED AG IM: CPT

## 2019-11-01 PROCEDURE — 99214 OFFICE O/P EST MOD 30 MIN: CPT | Mod: 25

## 2019-11-01 PROCEDURE — G0008: CPT

## 2019-11-01 RX ORDER — DOXYCYCLINE 100 MG/1
100 CAPSULE ORAL
Qty: 14 | Refills: 0 | Status: DISCONTINUED | COMMUNITY
Start: 2018-12-12 | End: 2019-11-01

## 2019-11-01 NOTE — ASSESSMENT
[FreeTextEntry1] : hx hypercalcemia- \par repeat CA ++, intact PTH stable \par -bone density 4/2018 nl \par -hydration advised \par -off HCTZ since 2018 \par \par Diabetes type 2 , on insulin and oral hypoglycemic \par -poc -6.0 stable \par -AIC was 5.7  7/2019 better \par - diet low in carbs, , continue current medications. Monitor Accu-Cheks daily, ophthalmology- due will schedule apt , Monitor for signs of hypoglycemia. \par d/w patient to eat a low carbohydrate diet Continue 1800 kcal ADA diet, avoid rice, pasta, sugar, sweet, soda, juices,start exercise daily 30 minutes \par \par Hypertension- 140/70 - improving \par - on enalapril 20 po bid add amlodipine -2.5 mg daily today \par -HCTZ was discontinued last 3/2018 \par - cardio consult reviewed - stress test negative Controlled, continue current medications, low sodium-DASH diet.\par -f/u 3 months check bp \par \par h/o hep c s/p interferon rx , RNA negative - AFP neg \par \par Hyperlipidemia\par d/c fibric acid , advised low carb diet , avoid fried foods, continue current medications,\par Low-fat diet, avoid red meat, cheese, butter, peanuts and exercise daily for 40 minutes.\par \par Health maintenance\par flu vaccine given- today 11/2019 \par colonoscope -8/2014 , due 5 yrs as per pt referral given \par Mammo - 9/2018 referral given \par Pap - 7/2017 \par Tetanus vaccine -2013\par Pneumovax -2011, Prevnar 13 2/2015 , Pneumovax 23 given 2016 \par dermatology -5/2019 for skin check \par zoster vaccine offered pt will check with insurance company and make appointment. \par bone density 4/2018 normal. \par

## 2019-11-01 NOTE — HISTORY OF PRESENT ILLNESS
[de-identified] : saw ophtho 10/2019 dx as cataract will be getting rt eye sx 1/22/2020 , left eye 2/26/2020 \par \par DM- doing better \par -checking readings  - occ reading 220 when she was non comp with diet , no hypoglycemic episode , Ophth visit was 8/2018 will schedule appt , no retinopathy .On Lantus 22 units with Po medications. compliant with diet and medications.\par - eliminated carbs in diet \par -walking in house , wants to but needs  to drive her to park where she can walk , she likes going out to park feels low mood walking in her neighborhood , has been eating a lot staying home \par \par Hyperlipidemia- on atorvastatin denies myalgias.\par \par Hypertension- on current medications enalapril 20 BID . off HCTZ 3/2018 to check BP today \par \par H/o hypercalcemia- monitoring calcium intake , stopped Po ca supplements last ca was normal \par - off hCTZ

## 2019-11-21 ENCOUNTER — RX RENEWAL (OUTPATIENT)
Age: 74
End: 2019-11-21

## 2019-12-12 ENCOUNTER — RX RENEWAL (OUTPATIENT)
Age: 74
End: 2019-12-12

## 2019-12-26 ENCOUNTER — FORM ENCOUNTER (OUTPATIENT)
Age: 74
End: 2019-12-26

## 2019-12-27 ENCOUNTER — APPOINTMENT (OUTPATIENT)
Dept: ULTRASOUND IMAGING | Facility: IMAGING CENTER | Age: 74
End: 2019-12-27
Payer: MEDICARE

## 2019-12-27 ENCOUNTER — OUTPATIENT (OUTPATIENT)
Dept: OUTPATIENT SERVICES | Facility: HOSPITAL | Age: 74
LOS: 1 days | End: 2019-12-27
Payer: MEDICARE

## 2019-12-27 ENCOUNTER — APPOINTMENT (OUTPATIENT)
Dept: MAMMOGRAPHY | Facility: IMAGING CENTER | Age: 74
End: 2019-12-27
Payer: MEDICARE

## 2019-12-27 DIAGNOSIS — Z00.00 ENCOUNTER FOR GENERAL ADULT MEDICAL EXAMINATION WITHOUT ABNORMAL FINDINGS: ICD-10-CM

## 2019-12-27 DIAGNOSIS — R92.0 MAMMOGRAPHIC MICROCALCIFICATION FOUND ON DIAGNOSTIC IMAGING OF BREAST: ICD-10-CM

## 2019-12-27 PROCEDURE — 77067 SCR MAMMO BI INCL CAD: CPT | Mod: 26

## 2019-12-27 PROCEDURE — 77063 BREAST TOMOSYNTHESIS BI: CPT | Mod: 26

## 2019-12-27 PROCEDURE — 76641 ULTRASOUND BREAST COMPLETE: CPT

## 2019-12-27 PROCEDURE — 76641 ULTRASOUND BREAST COMPLETE: CPT | Mod: 26,50

## 2019-12-27 PROCEDURE — 77067 SCR MAMMO BI INCL CAD: CPT

## 2019-12-27 PROCEDURE — 77063 BREAST TOMOSYNTHESIS BI: CPT

## 2020-01-03 ENCOUNTER — APPOINTMENT (OUTPATIENT)
Dept: INTERNAL MEDICINE | Facility: CLINIC | Age: 75
End: 2020-01-03
Payer: MEDICARE

## 2020-01-03 VITALS
BODY MASS INDEX: 24.59 KG/M2 | HEART RATE: 108 BPM | SYSTOLIC BLOOD PRESSURE: 134 MMHG | OXYGEN SATURATION: 97 % | DIASTOLIC BLOOD PRESSURE: 70 MMHG | HEIGHT: 66 IN | WEIGHT: 153 LBS | TEMPERATURE: 98.3 F

## 2020-01-03 PROCEDURE — 36415 COLL VENOUS BLD VENIPUNCTURE: CPT

## 2020-01-03 PROCEDURE — G0439: CPT

## 2020-01-03 PROCEDURE — G0444 DEPRESSION SCREEN ANNUAL: CPT | Mod: 59

## 2020-01-03 NOTE — PHYSICAL EXAM
[Well Nourished] : well nourished [No Acute Distress] : no acute distress [Well Developed] : well developed [Well-Appearing] : well-appearing [Normal Sclera/Conjunctiva] : normal sclera/conjunctiva [PERRL] : pupils equal round and reactive to light [Normal Outer Ear/Nose] : the outer ears and nose were normal in appearance [EOMI] : extraocular movements intact [Normal Oropharynx] : the oropharynx was normal [No JVD] : no jugular venous distention [No Lymphadenopathy] : no lymphadenopathy [Supple] : supple [Thyroid Normal, No Nodules] : the thyroid was normal and there were no nodules present [No Accessory Muscle Use] : no accessory muscle use [No Respiratory Distress] : no respiratory distress  [Clear to Auscultation] : lungs were clear to auscultation bilaterally [Normal Rate] : normal rate  [Normal S1, S2] : normal S1 and S2 [Regular Rhythm] : with a regular rhythm [No Murmur] : no murmur heard [No Carotid Bruits] : no carotid bruits [No Abdominal Bruit] : a ~M bruit was not heard ~T in the abdomen [No Varicosities] : no varicosities [Pedal Pulses Present] : the pedal pulses are present [No Edema] : there was no peripheral edema [No Palpable Aorta] : no palpable aorta [No Extremity Clubbing/Cyanosis] : no extremity clubbing/cyanosis [Soft] : abdomen soft [Non Tender] : non-tender [Non-distended] : non-distended [No Masses] : no abdominal mass palpated [No HSM] : no HSM [Normal Bowel Sounds] : normal bowel sounds [Normal Posterior Cervical Nodes] : no posterior cervical lymphadenopathy [Normal Anterior Cervical Nodes] : no anterior cervical lymphadenopathy [No CVA Tenderness] : no CVA  tenderness [No Spinal Tenderness] : no spinal tenderness [No Joint Swelling] : no joint swelling [Grossly Normal Strength/Tone] : grossly normal strength/tone [No Rash] : no rash [Coordination Grossly Intact] : coordination grossly intact [Normal Gait] : normal gait [No Focal Deficits] : no focal deficits [Deep Tendon Reflexes (DTR)] : deep tendon reflexes were 2+ and symmetric [Normal Affect] : the affect was normal [Normal Insight/Judgement] : insight and judgment were intact

## 2020-01-03 NOTE — HISTORY OF PRESENT ILLNESS
[Spouse] : spouse [de-identified] : came in for annual check up \par \par saw Ophth 10/2019 dx as cataract will be getting rt eye sx 1/22/2020 , left eye 2/26/2020 - will need preop clearance in 2 weeks \par \par DM- doing better \par -checking readings 115-125 -135 lowest 87  - occ reading 210 when she was non comp with diet , no hypoglycemic episode , Ophth visit was 10/1/19  , no retinopathy.On Lantus 22 units with Po medications. compliant with diet and medications.\par - eliminated carbs in diet \par -walking in house , wants to but needs  to drive her to park where she can walk , she likes going out to park feels low mood walking in her neighborhood , has been eating a lot staying home \par \par Hyperlipidemia- on atorvastatin denies myalgias.\par \par Hypertension- on current medications enalapril 20 BID. off HCTZ 3/2018 to check BP today \par \par H/o hypercalcemia- monitoring calcium intake , stopped Po ca supplements last ca was normal \par - off hCTZ

## 2020-01-03 NOTE — HEALTH RISK ASSESSMENT
[Good] : ~his/her~  mood as  good [No] : No [No falls in past year] : Patient reported no falls in the past year [0] : 2) Feeling down, depressed, or hopeless: Not at all (0) [With Significant Other] : lives with significant other [Retired] : retired [] :  [Feels Safe at Home] : Feels safe at home [Fully functional (bathing, dressing, toileting, transferring, walking, feeding)] : Fully functional (bathing, dressing, toileting, transferring, walking, feeding) [Fully functional (using the telephone, shopping, preparing meals, housekeeping, doing laundry, using] : Fully functional and needs no help or supervision to perform IADLs (using the telephone, shopping, preparing meals, housekeeping, doing laundry, using transportation, managing medications and managing finances) [] : No [de-identified] : walking  [[id number]] : 0 [Hepatitis C test declined] : Hepatitis C test declined [Reports changes in vision] : Reports no changes in vision [Reports changes in hearing] : Reports no changes in hearing [Reports changes in dental health] : Reports no changes in dental health [HepatitisCDate] : 6/27/15  [HepatitisCComments] : positive

## 2020-01-03 NOTE — ASSESSMENT
[FreeTextEntry1] : hx hypercalcemia- \par repeat CA ++, intact PTH stable \par -bone density 4/2018 nl \par -hydration advised \par -off HCTZ since 2018 \par \par Diabetes type 2 , on insulin and oral hypoglycemic -  stable \par -get aic \par - diet low in carbs, , continue current medications. Monitor Accu-Cheks daily, ophthalmology- due will schedule apt , Monitor for signs of hypoglycemia. \par d/w patient to eat a low carbohydrate diet Continue 1800 kcal ADA diet, avoid rice, pasta, sugar, sweet, soda, juices,start exercise daily 30 minutes \par \par Hypertension- 140/70 - improving \par - on enalapril 20 po bid add amlodipine -2.5 mg daily today \par -HCTZ was discontinued last 3/2018 \par - cardio consult reviewed - stress test negative Controlled, continue current medications, low sodium-DASH diet.\par -f/u 3 months check bp \par \par h/o hep c s/p interferon rx , RNA negative - AFP neg \par \par Hyperlipidemia\par d/c fibric acid , advised low carb diet , avoid fried foods, continue current medications,\par Low-fat diet, avoid red meat, cheese, butter, peanuts and exercise daily for 40 minutes.\par \par Health maintenance\par flu vaccine given-  11/2019 \par colonoscope -8/2014 , due 5 yrs as per pt referral given \par Mammo - 12/2019 Bi rad 2 \par Pap - 7/2017 \par Tetanus vaccine -2013\par Pneumovax -complete (2011, Prevnar 13 2/2015 , Pneumovax 23 given 2016 )\par dermatology -5/2019 for skin check \par zoster vaccine offered pt will check with insurance company and make appointment. \par bone density 4/2018 normal. due 3-5 yrs \par

## 2020-01-08 LAB
25(OH)D3 SERPL-MCNC: 27.1 NG/ML
ALBUMIN SERPL ELPH-MCNC: 4.5 G/DL
ALP BLD-CCNC: 45 U/L
ALT SERPL-CCNC: 14 U/L
ANION GAP SERPL CALC-SCNC: 13 MMOL/L
APPEARANCE: CLEAR
AST SERPL-CCNC: 17 U/L
BASOPHILS # BLD AUTO: 0.03 K/UL
BASOPHILS NFR BLD AUTO: 0.6 %
BILIRUB SERPL-MCNC: 0.2 MG/DL
BILIRUBIN URINE: NEGATIVE
BLOOD URINE: NEGATIVE
BUN SERPL-MCNC: 9 MG/DL
CALCIUM SERPL-MCNC: 9.6 MG/DL
CHLORIDE SERPL-SCNC: 102 MMOL/L
CHOLEST SERPL-MCNC: 134 MG/DL
CHOLEST/HDLC SERPL: 4.5 RATIO
CO2 SERPL-SCNC: 23 MMOL/L
COLOR: NORMAL
CREAT SERPL-MCNC: 0.57 MG/DL
EOSINOPHIL # BLD AUTO: 0.13 K/UL
EOSINOPHIL NFR BLD AUTO: 2.5 %
ESTIMATED AVERAGE GLUCOSE: 120 MG/DL
GLUCOSE QUALITATIVE U: NEGATIVE
GLUCOSE SERPL-MCNC: 117 MG/DL
HBA1C MFR BLD HPLC: 5.8 %
HCT VFR BLD CALC: 34.1 %
HDLC SERPL-MCNC: 30 MG/DL
HGB BLD-MCNC: 10.7 G/DL
IMM GRANULOCYTES NFR BLD AUTO: 0.2 %
KETONES URINE: NEGATIVE
LDLC SERPL CALC-MCNC: 78 MG/DL
LEUKOCYTE ESTERASE URINE: NEGATIVE
LYMPHOCYTES # BLD AUTO: 1.46 K/UL
LYMPHOCYTES NFR BLD AUTO: 28.5 %
MAN DIFF?: NORMAL
MCHC RBC-ENTMCNC: 22.4 PG
MCHC RBC-ENTMCNC: 31.4 GM/DL
MCV RBC AUTO: 71.3 FL
MONOCYTES # BLD AUTO: 0.54 K/UL
MONOCYTES NFR BLD AUTO: 10.5 %
NEUTROPHILS # BLD AUTO: 2.96 K/UL
NEUTROPHILS NFR BLD AUTO: 57.7 %
NITRITE URINE: NEGATIVE
PH URINE: 7.5
PLATELET # BLD AUTO: 342 K/UL
POTASSIUM SERPL-SCNC: 4.5 MMOL/L
PROT SERPL-MCNC: 7.4 G/DL
PROTEIN URINE: NEGATIVE
RBC # BLD: 4.78 M/UL
RBC # FLD: 19.3 %
SODIUM SERPL-SCNC: 138 MMOL/L
SPECIFIC GRAVITY URINE: 1.01
TRIGL SERPL-MCNC: 128 MG/DL
TSH SERPL-ACNC: 2.1 UIU/ML
UROBILINOGEN URINE: NORMAL
VIT B12 SERPL-MCNC: 451 PG/ML
WBC # FLD AUTO: 5.13 K/UL

## 2020-01-13 ENCOUNTER — NON-APPOINTMENT (OUTPATIENT)
Age: 75
End: 2020-01-13

## 2020-01-13 ENCOUNTER — APPOINTMENT (OUTPATIENT)
Dept: INTERNAL MEDICINE | Facility: CLINIC | Age: 75
End: 2020-01-13
Payer: MEDICARE

## 2020-01-13 VITALS
OXYGEN SATURATION: 97 % | WEIGHT: 156 LBS | BODY MASS INDEX: 25.07 KG/M2 | HEART RATE: 89 BPM | DIASTOLIC BLOOD PRESSURE: 80 MMHG | TEMPERATURE: 98.4 F | SYSTOLIC BLOOD PRESSURE: 170 MMHG | HEIGHT: 66 IN

## 2020-01-13 PROCEDURE — 93000 ELECTROCARDIOGRAM COMPLETE: CPT

## 2020-01-13 PROCEDURE — 99214 OFFICE O/P EST MOD 30 MIN: CPT | Mod: 25

## 2020-01-13 RX ORDER — INSULIN GLARGINE 100 [IU]/ML
100 INJECTION, SOLUTION SUBCUTANEOUS
Qty: 3 | Refills: 3 | Status: COMPLETED | COMMUNITY
Start: 2020-01-13 | End: 2020-01-13

## 2020-01-13 NOTE — ASSESSMENT
[High Risk Surgery - Intraperitoneal, Intrathoracic or Supringuinal Vascular Procedures] : High Risk Surgery - Intraperitoneal, Intrathoracic or Supringuinal Vascular Procedures - No (0) [Ischemic Heart Disease] : Ischemic Heart Disease - No (0) [Congestive Heart Failure] : Congestive Heart Failure - No (0) [Prior Cerebrovascular Accident or TIA] : Prior Cerebrovascular Accident or TIA - No (0) [Insulin-dependent Diabetic (1 point)] : Insulin-dependent Diabetic - Yes (1) [Creatinine >= 2mg/dL (1 Point)] : Creatinine >= 2mg/dL - No (0) [1] : 1 , RCRI Class: II, Risk of Post-Op Cardiac Complications: 0.9%, Procedure Risk: Low-Risk [Patient Optimized for Surgery] : Patient optimized for surgery [Modify medications prior to procedure] : Modify medications prior to procedure [As per surgery] : as per surgery [FreeTextEntry4] : right eye cataract surgery -1/22/2020 - no contra indication for proposed procedure \par -hold oral hypoglycemic medications on day of procedure , lantus take 7 units night before surgery \par -no asprin , NSAID , MVI \par \par Anemia- new onset - last colonoscope 8/2014 due now will see gastro has appt 1/28th \par \par hx hypercalcemia- stable \par repeat CA ++, intact PTH stable \par -bone density 4/2018 nl \par -hydration advised \par -off HCTZ since 2018 \par \par Diabetes type 2 , on insulin and oral hypoglycemic - stable \par -AIC 5.8 1/2020 -\par - diet low in carbs, , continue current medications. Monitor Accu-Cheks daily, ophthalmology- due will schedule apt , Monitor for signs of hypoglycemia. \par d/w patient to eat a low carbohydrate diet Continue 1800 kcal ADA diet, avoid rice, pasta, sugar, sweet, soda, juices,start exercise daily 30 minutes \par \par Hypertension- 140/70 - improving \par - on enalapril 20 po bid add amlodipine -2.5 mg daily today \par -HCTZ was discontinued last 3/2018 \par - cardio consult reviewed - stress test negative Controlled, continue current medications, low sodium-DASH diet.\par -f/u 3 months check bp \par \par h/o hep c s/p interferon rx , RNA negative - AFP neg \par \par Hyperlipidemia\par d/c fibric acid , advised low carb diet , avoid fried foods, continue current medications,\par Low-fat diet, avoid red meat, cheese, butter, peanuts and exercise daily for 40 minutes.\par \par  [FreeTextEntry7] : hold oral hypoglycemic medications on day of procedure , lantus take 7 units night before surgery

## 2020-01-13 NOTE — HISTORY OF PRESENT ILLNESS
[No Pertinent Cardiac History] : no history of aortic stenosis, atrial fibrillation, coronary artery disease, recent myocardial infarction, or implantable device/pacemaker [No Pertinent Pulmonary History] : no history of asthma, COPD, sleep apnea, or smoking [No Adverse Anesthesia Reaction] : no adverse anesthesia reaction in self or family member [Diabetes] : diabetes [(Patient denies any chest pain, claudication, dyspnea on exertion, orthopnea, palpitations or syncope)] : Patient denies any chest pain, claudication, dyspnea on exertion, orthopnea, palpitations or syncope [Self] : no previous adverse anesthesia reaction [Family Member] : no family member with adverse anesthesia reaction/sudden death [Smoker] : not a smoker [Chronic Anticoagulation] : no chronic anticoagulation [FreeTextEntry2] : 1/22/2020 rt eye  [Chronic Kidney Disease] : no chronic kidney disease [FreeTextEntry1] : right eye cataract surgery  [FreeTextEntry3] : Dr Sun [id number] [FreeTextEntry4] : saw Ophth 10/2019 dx as cataract will be getting rt eye sx 1/22/2020 , left eye 2/26/2020 - will need preop clearance in 2 weeks \par \par anemia- new onset - to see gastro for colonoscope -1/28 \par \par DM- doing better \par -checking readings 115-125 -135 lowest 87 - occ reading 210 when she was non comp with diet , no hypoglycemic episode , Ophth visit was 10/1/19 , no retinopathy.On Lantus 22 units with Po medications. compliant with diet and medications.\par - eliminated carbs in diet \par -walking in house , wants to but needs  to drive her to park where she can walk , she likes going out to park feels low mood walking in her neighborhood , has been eating a lot staying home \par \par Hyperlipidemia- on atorvastatin denies myalgias.\par \par Hypertension- on current medications enalapril 20 BID. off HCTZ 3/2018 to check BP today \par \par H/o hypercalcemia- monitoring calcium intake , stopped Po ca supplements last ca was normal \par - off hCTZ \par  \par

## 2020-01-13 NOTE — RESULTS/DATA
[] : results reviewed [de-identified] : AIC -5.8  [de-identified] : H - 10.7 1/8th  [de-identified] : NSR at 97 BPM no change 2017

## 2020-01-28 ENCOUNTER — APPOINTMENT (OUTPATIENT)
Dept: GASTROENTEROLOGY | Facility: CLINIC | Age: 75
End: 2020-01-28
Payer: MEDICARE

## 2020-01-28 VITALS
BODY MASS INDEX: 24.59 KG/M2 | WEIGHT: 153 LBS | HEART RATE: 111 BPM | HEIGHT: 66 IN | SYSTOLIC BLOOD PRESSURE: 175 MMHG | DIASTOLIC BLOOD PRESSURE: 90 MMHG

## 2020-01-28 DIAGNOSIS — Z86.010 PERSONAL HISTORY OF COLONIC POLYPS: ICD-10-CM

## 2020-01-28 DIAGNOSIS — K57.30 DIVERTICULOSIS OF LARGE INTESTINE W/OUT PERFORATION OR ABSCESS W/OUT BLEEDING: ICD-10-CM

## 2020-01-28 DIAGNOSIS — R14.0 ABDOMINAL DISTENSION (GASEOUS): ICD-10-CM

## 2020-01-28 DIAGNOSIS — R15.9 FULL INCONTINENCE OF FECES: ICD-10-CM

## 2020-01-28 PROCEDURE — 82274 ASSAY TEST FOR BLOOD FECAL: CPT | Mod: QW

## 2020-01-28 PROCEDURE — 99204 OFFICE O/P NEW MOD 45 MIN: CPT

## 2020-02-19 ENCOUNTER — APPOINTMENT (OUTPATIENT)
Dept: INTERNAL MEDICINE | Facility: CLINIC | Age: 75
End: 2020-02-19
Payer: MEDICARE

## 2020-02-19 VITALS
TEMPERATURE: 98.8 F | DIASTOLIC BLOOD PRESSURE: 74 MMHG | SYSTOLIC BLOOD PRESSURE: 156 MMHG | HEIGHT: 66 IN | WEIGHT: 154 LBS | OXYGEN SATURATION: 98 % | HEART RATE: 108 BPM | BODY MASS INDEX: 24.75 KG/M2

## 2020-02-19 DIAGNOSIS — H26.9 UNSPECIFIED CATARACT: ICD-10-CM

## 2020-02-19 LAB
ALBUMIN SERPL ELPH-MCNC: 4.6 G/DL
ALP BLD-CCNC: 45 U/L
ALT SERPL-CCNC: 8 U/L
ANION GAP SERPL CALC-SCNC: 18 MMOL/L
AST SERPL-CCNC: 17 U/L
BASOPHILS # BLD AUTO: 0.01 K/UL
BASOPHILS NFR BLD AUTO: 0.2 %
BILIRUB SERPL-MCNC: 0.2 MG/DL
BUN SERPL-MCNC: 9 MG/DL
CALCIUM SERPL-MCNC: 9.9 MG/DL
CHLORIDE SERPL-SCNC: 101 MMOL/L
CO2 SERPL-SCNC: 22 MMOL/L
CREAT SERPL-MCNC: 0.61 MG/DL
EOSINOPHIL # BLD AUTO: 0.13 K/UL
EOSINOPHIL NFR BLD AUTO: 2.3 %
GLUCOSE SERPL-MCNC: 102 MG/DL
HCT VFR BLD CALC: 36.4 %
HGB BLD-MCNC: 10.9 G/DL
IMM GRANULOCYTES NFR BLD AUTO: 0.3 %
INR PPP: 0.97 RATIO
LYMPHOCYTES # BLD AUTO: 1.71 K/UL
LYMPHOCYTES NFR BLD AUTO: 29.8 %
MAN DIFF?: NORMAL
MCHC RBC-ENTMCNC: 22.1 PG
MCHC RBC-ENTMCNC: 29.9 GM/DL
MCV RBC AUTO: 73.8 FL
MONOCYTES # BLD AUTO: 0.61 K/UL
MONOCYTES NFR BLD AUTO: 10.6 %
NEUTROPHILS # BLD AUTO: 3.25 K/UL
NEUTROPHILS NFR BLD AUTO: 56.8 %
PLATELET # BLD AUTO: 348 K/UL
POTASSIUM SERPL-SCNC: 4.2 MMOL/L
PROT SERPL-MCNC: 7.5 G/DL
PT BLD: 11.2 SEC
RBC # BLD: 4.93 M/UL
RBC # FLD: 19.7 %
SODIUM SERPL-SCNC: 141 MMOL/L
WBC # FLD AUTO: 5.73 K/UL

## 2020-02-19 PROCEDURE — 99214 OFFICE O/P EST MOD 30 MIN: CPT | Mod: 25

## 2020-02-19 PROCEDURE — 36415 COLL VENOUS BLD VENIPUNCTURE: CPT

## 2020-02-19 RX ORDER — BLOOD-GLUCOSE METER
W/DEVICE EACH MISCELLANEOUS
Qty: 1 | Refills: 0 | Status: ACTIVE | COMMUNITY
Start: 2017-05-12 | End: 1900-01-01

## 2020-02-19 NOTE — HISTORY OF PRESENT ILLNESS
[No Pertinent Pulmonary History] : no history of asthma, COPD, sleep apnea, or smoking [No Pertinent Cardiac History] : no history of aortic stenosis, atrial fibrillation, coronary artery disease, recent myocardial infarction, or implantable device/pacemaker [No Adverse Anesthesia Reaction] : no adverse anesthesia reaction in self or family member [Diabetes] : diabetes [(Patient denies any chest pain, claudication, dyspnea on exertion, orthopnea, palpitations or syncope)] : Patient denies any chest pain, claudication, dyspnea on exertion, orthopnea, palpitations or syncope [Asthma] : no asthma [COPD] : no COPD [Sleep Apnea] : no sleep apnea [Self] : no previous adverse anesthesia reaction [Smoker] : not a smoker [Family Member] : no family member with adverse anesthesia reaction/sudden death [Chronic Kidney Disease] : no chronic kidney disease [Chronic Anticoagulation] : no chronic anticoagulation [FreeTextEntry2] : 2/26/2020 [FreeTextEntry1] : left eye cataract surgery  [FreeTextEntry3] : Dr. Sun  [FreeTextEntry4] : saw Ophth 10/2019 dx as cataract , did rt eye sx 1/22/2020 , left eye scheduled 2/26/2020\par \par anemia- new onset - saw gastro scheduled for colonoscope -3/2/2020\par \par DM- doing better \par -checking readings 115-125 -135 lowest 87 - occ reading 210 when she was non comp with diet , no hypoglycemic episode , Ophth visit was 10/1/19 , no retinopathy.On Lantus 22 units with Po medications. compliant with diet and medications.\par - eliminated carbs in diet \par -walking in house , wants to but needs  to drive her to park where she can walk , she likes going out to park feels low mood walking in her neighborhood , has been eating a lot staying home \par \par Hyperlipidemia- on atorvastatin denies myalgias.\par \par Hypertension- on current medications enalapril 20 BID. off HCTZ 3/2018 to check BP today \par \par H/o hypercalcemia- monitoring calcium intake , stopped Po ca supplements last ca was normal \par - off hCTZ \par

## 2020-02-19 NOTE — PHYSICAL EXAM
[No Acute Distress] : no acute distress [Well Nourished] : well nourished [Well Developed] : well developed [Well-Appearing] : well-appearing [Normal Sclera/Conjunctiva] : normal sclera/conjunctiva [PERRL] : pupils equal round and reactive to light [EOMI] : extraocular movements intact [Normal Outer Ear/Nose] : the outer ears and nose were normal in appearance [Normal Oropharynx] : the oropharynx was normal [No JVD] : no jugular venous distention [No Lymphadenopathy] : no lymphadenopathy [Supple] : supple [Thyroid Normal, No Nodules] : the thyroid was normal and there were no nodules present [No Respiratory Distress] : no respiratory distress  [No Accessory Muscle Use] : no accessory muscle use [Clear to Auscultation] : lungs were clear to auscultation bilaterally [Normal Rate] : normal rate  [Regular Rhythm] : with a regular rhythm [Normal S1, S2] : normal S1 and S2 [No Murmur] : no murmur heard [No Abdominal Bruit] : a ~M bruit was not heard ~T in the abdomen [No Carotid Bruits] : no carotid bruits [No Varicosities] : no varicosities [Pedal Pulses Present] : the pedal pulses are present [No Edema] : there was no peripheral edema [No Palpable Aorta] : no palpable aorta [No Extremity Clubbing/Cyanosis] : no extremity clubbing/cyanosis [Soft] : abdomen soft [Non Tender] : non-tender [Non-distended] : non-distended [No Masses] : no abdominal mass palpated [No HSM] : no HSM [Normal Bowel Sounds] : normal bowel sounds [Normal Posterior Cervical Nodes] : no posterior cervical lymphadenopathy [Normal Anterior Cervical Nodes] : no anterior cervical lymphadenopathy [No CVA Tenderness] : no CVA  tenderness [No Spinal Tenderness] : no spinal tenderness [No Joint Swelling] : no joint swelling [Grossly Normal Strength/Tone] : grossly normal strength/tone [No Rash] : no rash [Coordination Grossly Intact] : coordination grossly intact [No Focal Deficits] : no focal deficits [Normal Gait] : normal gait [Deep Tendon Reflexes (DTR)] : deep tendon reflexes were 2+ and symmetric [Normal Affect] : the affect was normal [Normal Insight/Judgement] : insight and judgment were intact

## 2020-02-19 NOTE — ASSESSMENT
[Modify medications prior to procedure] : Modify medications prior to procedure [As per surgery] : as per surgery [High Risk Surgery - Intraperitoneal, Intrathoracic or Supringuinal Vascular Procedures] : High Risk Surgery - Intraperitoneal, Intrathoracic or Supringuinal Vascular Procedures - No (0) [Ischemic Heart Disease] : Ischemic Heart Disease - No (0) [Insulin-dependent Diabetic (1 point)] : Insulin-dependent Diabetic - Yes (1) [Prior Cerebrovascular Accident or TIA] : Prior Cerebrovascular Accident or TIA - No (0) [Congestive Heart Failure] : Congestive Heart Failure - No (0) [1] : 1 , RCRI Class: II, Risk of Post-Op Cardiac Complications: 6.0%, 95% CI for Risk Estimate: 4.9% - 7.4% [Creatinine >= 2mg/dL (1 Point)] : Creatinine >= 2mg/dL - No (0) [FreeTextEntry7] : -hold oral hypoglycemic medications on day of procedure , lantus take 7 units night before surgery \par -hold oral hypoglycemic medications on day of procedure , lantus take 7 units night before surgery \par -no asprin , NSAID , MVI \par \par Anemia- new onset - last colonoscope 8/2014 due now will see gastro has appt 1/28th \par \par hx hypercalcemia- stable \par repeat CA ++, intact PTH stable \par -bone density 4/2018 nl \par -hydration advised \par -off HCTZ since 2018 \par \par Diabetes type 2 , on insulin and oral hypoglycemic - stable \par -AIC 5.8 1/2020 -\par - diet low in carbs, , continue current medications. Monitor Accu-Cheks daily, ophthalmology- due will schedule apt , Monitor for signs of hypoglycemia. \par d/w patient to eat a low carbohydrate diet Continue 1800 kcal ADA diet, avoid rice, pasta, sugar, sweet, soda, juices,start exercise daily 30 minutes \par \par Hypertension- 140/70 - improving \par - on enalapril 20 po bid add amlodipine -2.5 mg daily today \par -HCTZ was discontinued last 3/2018 \par - cardio consult reviewed - stress test negative Controlled, continue current medications, low sodium-DASH diet.\par -f/u 3 months check bp \par \par h/o hep c s/p interferon rx , RNA negative - AFP neg \par \par Hyperlipidemia\par d/c fibric acid , advised low carb diet , avoid fried foods, continue current medications,\par Low-fat diet, avoid red meat, cheese, butter, peanuts and exercise daily for 40 minutes.\par right eye cataract surgery -1/22/2020 - no contra indication for proposed procedure \par -hold oral hypoglycemic medications on day of procedure , lantus take 7 units night before surgery \par -no asprin , NSAID , MVI \par \par Anemia- new onset - last colonoscope 8/2014 due now will see gastro has appt 1/28th \par \par hx hypercalcemia- stable \par repeat CA ++, intact PTH stable \par -bone density 4/2018 nl \par -hydration advised \par -off HCTZ since 2018 \par \par Diabetes type 2 , on insulin and oral hypoglycemic - stable \par -AIC 5.8 1/2020 -\par - diet low in carbs, , continue current medications. Monitor Accu-Cheks daily, ophthalmology- due will schedule apt , Monitor for signs of hypoglycemia. \par d/w patient to eat a low carbohydrate diet Continue 1800 kcal ADA diet, avoid rice, pasta, sugar, sweet, soda, juices,start exercise daily 30 minutes \par \par Hypertension- 140/70 - improving \par - on enalapril 20 po bid add amlodipine -2.5 mg daily today \par -HCTZ was discontinued last 3/2018 \par - cardio consult reviewed - stress test negative Controlled, continue current medications, low sodium-DASH diet.\par -f/u 3 months check bp \par \par h/o hep c s/p interferon rx , RNA negative - AFP neg \par \par Hyperlipidemia\par d/c fibric acid , advised low carb diet , avoid fried foods, continue current medications,\par Low-fat diet, avoid red meat, cheese, butter, peanuts and exercise daily for 40 minutes.\par  [FreeTextEntry4] : left  eye cataract surgery -2/26/2020 - no contra indication for proposed procedure \par -hold oral hypoglycemic medications on day of procedure , Lantus take 7 units night before surgery \par -no asprin , NSAID , MVI \par \par Anemia- new onset - last colonoscope 8/2014 due for colonoscope in 3/2020 has appt \par \par hx hypercalcemia- stable \par repeat CA ++, intact PTH stable \par -bone density 4/2018 nl \par -hydration advised \par -off HCTZ since 2018 \par \par Diabetes type 2 , on insulin and oral hypoglycemic - stable \par -AIC 5.8 1/2020 -\par - diet low in carbs, , continue current medications. Monitor Accu-Cheks daily, ophthalmology- due will schedule apt , Monitor for signs of hypoglycemia. \par d/w patient to eat a low carbohydrate diet Continue 1800 kcal ADA diet, avoid rice, pasta, sugar, sweet, soda, juices,start exercise daily 30 minutes \par \par Hypertension- 150/70 - \par - on enalapril 20 po bid , change to amlodipine 5 mg daily today \par -HCTZ was discontinued last 3/2018 \par - cardio consult reviewed - stress test negative Controlled, continue current medications, low sodium-DASH diet.\par -f/u 3 months check bp \par \par h/o hep c s/p interferon rx , RNA negative - AFP neg \par \par Hyperlipidemia\par d/c fibric acid , advised low carb diet , avoid fried foods, continue current medications,\par Low-fat diet, avoid red meat, cheese, butter, peanuts and exercise daily for 40 minutes.\par

## 2020-02-20 LAB
APPEARANCE: CLEAR
BILIRUBIN URINE: NEGATIVE
BLOOD URINE: NEGATIVE
COLOR: NORMAL
GLUCOSE QUALITATIVE U: NEGATIVE
KETONES URINE: NEGATIVE
LEUKOCYTE ESTERASE URINE: NEGATIVE
NITRITE URINE: NEGATIVE
PH URINE: 7
PROTEIN URINE: NEGATIVE
SPECIFIC GRAVITY URINE: 1.01
UROBILINOGEN URINE: NORMAL

## 2020-02-25 ENCOUNTER — RX RENEWAL (OUTPATIENT)
Age: 75
End: 2020-02-25

## 2020-03-02 ENCOUNTER — APPOINTMENT (OUTPATIENT)
Dept: GASTROENTEROLOGY | Facility: CLINIC | Age: 75
End: 2020-03-02
Payer: MEDICARE

## 2020-03-02 ENCOUNTER — LABORATORY RESULT (OUTPATIENT)
Age: 75
End: 2020-03-02

## 2020-03-02 ENCOUNTER — FORM ENCOUNTER (OUTPATIENT)
Age: 75
End: 2020-03-02

## 2020-03-02 PROCEDURE — 43239 EGD BIOPSY SINGLE/MULTIPLE: CPT | Mod: 59

## 2020-03-02 PROCEDURE — 45380 COLONOSCOPY AND BIOPSY: CPT

## 2020-03-02 RX ORDER — POLYETHYLENE GLYCOL-3350, SODIUM CHLORIDE, POTASSIUM CHLORIDE AND SODIUM BICARBONATE 420; 11.2; 5.72; 1.48 G/438.4G; G/438.4G; G/438.4G; G/438.4G
420 POWDER, FOR SOLUTION ORAL
Qty: 1 | Refills: 0 | Status: DISCONTINUED | COMMUNITY
Start: 2020-01-28 | End: 2020-03-02

## 2020-03-02 NOTE — CONSULT LETTER
[Dear  ___] : Dear  [unfilled], [Consult Letter:] : I had the pleasure of evaluating your patient, [unfilled]. [Please see my note below.] : Please see my note below. [Consult Closing:] : Thank you very much for allowing me to participate in the care of this patient.  If you have any questions, please do not hesitate to contact me. [Sincerely,] : Sincerely, [FreeTextEntry3] : Ike Pabon M.D.\par

## 2020-03-02 NOTE — PHYSICAL EXAM
[General Appearance - Alert] : alert [General Appearance - In No Acute Distress] : in no acute distress [General Appearance - Well Nourished] : well nourished [General Appearance - Well Developed] : well developed [Oropharynx] : the oropharynx was normal [Outer Ear] : the ears and nose were normal in appearance [Neck Cervical Mass (___cm)] : no neck mass was observed [Neck Appearance] : the appearance of the neck was normal [Jugular Venous Distention Increased] : there was no jugular-venous distention [Thyroid Nodule] : there were no palpable thyroid nodules [Thyroid Diffuse Enlargement] : the thyroid was not enlarged [Auscultation Breath Sounds / Voice Sounds] : lungs were clear to auscultation bilaterally [Heart Sounds] : normal S1 and S2 [Heart Sounds Gallop] : no gallops [Murmurs] : no murmurs [Heart Sounds Pericardial Friction Rub] : no pericardial rub [Edema] : there was no peripheral edema [Full Pulse] : the pedal pulses are present [Abdomen Soft] : soft [Bowel Sounds] : normal bowel sounds [Abdomen Tenderness] : non-tender [Abdomen Hernia] : no hernia was discovered [Abdomen Mass (___ Cm)] : no abdominal mass palpated [No Rectal Mass] : no rectal mass [Cervical Lymph Nodes Enlarged Posterior Bilaterally] : posterior cervical [Cervical Lymph Nodes Enlarged Anterior Bilaterally] : anterior cervical [Axillary Lymph Nodes Enlarged Bilaterally] : axillary [Femoral Lymph Nodes Enlarged Bilaterally] : femoral [Supraclavicular Lymph Nodes Enlarged Bilaterally] : supraclavicular [No Spinal Tenderness] : no spinal tenderness [No CVA Tenderness] : no ~M costovertebral angle tenderness [Inguinal Lymph Nodes Enlarged Bilaterally] : inguinal [Abnormal Walk] : normal gait [Musculoskeletal - Swelling] : no joint swelling seen [Nail Clubbing] : no clubbing  or cyanosis of the fingernails [Skin Color & Pigmentation] : normal skin color and pigmentation [Oriented To Time, Place, And Person] : oriented to person, place, and time [] : no rash [Skin Turgor] : normal skin turgor [Affect] : the affect was normal [Impaired Insight] : insight and judgment were intact [External Hemorrhoid] : no external hemorrhoids [Internal Hemorrhoid] : no internal hemorrhoids [Occult Blood Positive] : stool was negative for occult blood [FreeTextEntry1] : left leg surgical scars

## 2020-03-02 NOTE — REASON FOR VISIT
[Consultation] : a consultation visit [Other: _____] : [unfilled] [FreeTextEntry1] : Pre-colonoscopic exam. Pt's last CO was in 2014 which showed polyps.  Pt c/o intermittent lower abdominal pain, move bowels after every meal, gas.

## 2020-03-02 NOTE — ASSESSMENT
[FreeTextEntry1] : 1. History of colonic polyp, with diverticulosis at last colonoscopy August 2014; recent change in bowels with postprandial stooling, gassiness, some incontinence--rule out colitis, celiac disease, GI neoplasia. At increased risk for bacterial overgrowth.\par 2. Recent GERD with early satiety--rule out erosive esophagitis, Helicobacter pylori, neoplasm.\par 3. Mild microcytic anemia--concerned about smoldering GI inflammation, neoplasm.\par 4. Type 2 diabetes mellitus.\par 5. History of hepatitis C, eradicated.\par 6. Hypertension.\par 7. Hyperlipidemia.\par 8. Status post left leg lengthening surgery.\par 9. Status post right iridectomy, contemplating left cataract extraction in late February.\par \par Plan:\par 1. Medical record release for Dr. Montenegro.\par 2. [facility] records reviewed.\par 3. [facility] brochure on "GERD" given and discussed.\par 4. Schedule colonoscopy, with EGD to follow--she will speak with her doctors regarding how to adjust her diabetes medicines prior to the procedure. Procedures, rationale, alternatives, material risks, anesthesia plan, and PEG prep instructions were reviewed and brochures given.\par 5. Probable PPI after EGD. "PPI Information Sheet" given and discussed.\par 6. Consider imaging study if panendoscopy unrevealing.

## 2020-03-02 NOTE — HISTORY OF PRESENT ILLNESS
[FreeTextEntry1] : A small cecal polyp was removed at colonoscopy October 2011 (Dr. Ingram); only diverticulosis was noted at last colonoscopy August 2014 (Dr. Montenegro). Over the past six months, Jocelynn has noted change in bowels, with predictable postprandial urge to defecate, excessive flatulence, intermittent lower abdominal cramps with borborygmi, and occasional soiling. She is averaging 3 BMs/day; her prior baseline was 1 BM/day. She also has been experiencing reflux daily over the past six months, with intermittent early satiety, and incomplete improvement with Pepcid. Recent labs revealed microcytic anemia (Hgb 10.7/Hct 34.1, MCV 71). She has been taking Metformin daily for the past 10 years. EGD was performed in the remote past. She denies weight loss, ASA/NSAID use, FH of GI disease, or rectal bleeding. She will be undergoing left iridectomy in late February; she underwent right iridectomy earlier this month.

## 2020-03-03 ENCOUNTER — OUTPATIENT (OUTPATIENT)
Dept: OUTPATIENT SERVICES | Facility: HOSPITAL | Age: 75
LOS: 1 days | End: 2020-03-03
Payer: MEDICARE

## 2020-03-03 ENCOUNTER — APPOINTMENT (OUTPATIENT)
Dept: CT IMAGING | Facility: IMAGING CENTER | Age: 75
End: 2020-03-03
Payer: MEDICARE

## 2020-03-03 DIAGNOSIS — Z00.8 ENCOUNTER FOR OTHER GENERAL EXAMINATION: ICD-10-CM

## 2020-03-03 PROCEDURE — 74177 CT ABD & PELVIS W/CONTRAST: CPT

## 2020-03-03 PROCEDURE — 74177 CT ABD & PELVIS W/CONTRAST: CPT | Mod: 26

## 2020-03-03 PROCEDURE — 71260 CT THORAX DX C+: CPT

## 2020-03-03 PROCEDURE — 71260 CT THORAX DX C+: CPT | Mod: 26

## 2020-03-22 ENCOUNTER — OUTPATIENT (OUTPATIENT)
Dept: OUTPATIENT SERVICES | Facility: HOSPITAL | Age: 75
LOS: 1 days | End: 2020-03-22
Payer: MEDICARE

## 2020-03-22 ENCOUNTER — APPOINTMENT (OUTPATIENT)
Dept: MRI IMAGING | Facility: IMAGING CENTER | Age: 75
End: 2020-03-22
Payer: MEDICARE

## 2020-03-22 DIAGNOSIS — Z00.8 ENCOUNTER FOR OTHER GENERAL EXAMINATION: ICD-10-CM

## 2020-03-22 PROCEDURE — 74183 MRI ABD W/O CNTR FLWD CNTR: CPT

## 2020-03-22 PROCEDURE — A9585: CPT

## 2020-03-22 PROCEDURE — 74183 MRI ABD W/O CNTR FLWD CNTR: CPT | Mod: 26

## 2020-03-23 ENCOUNTER — RX RENEWAL (OUTPATIENT)
Age: 75
End: 2020-03-23

## 2020-05-06 ENCOUNTER — RX RENEWAL (OUTPATIENT)
Age: 75
End: 2020-05-06

## 2020-05-13 ENCOUNTER — APPOINTMENT (OUTPATIENT)
Dept: INTERNAL MEDICINE | Facility: CLINIC | Age: 75
End: 2020-05-13

## 2020-05-30 ENCOUNTER — OUTPATIENT (OUTPATIENT)
Dept: OUTPATIENT SERVICES | Facility: HOSPITAL | Age: 75
LOS: 1 days | End: 2020-05-30
Payer: MEDICARE

## 2020-05-30 DIAGNOSIS — Z11.59 ENCOUNTER FOR SCREENING FOR OTHER VIRAL DISEASES: ICD-10-CM

## 2020-05-30 PROCEDURE — U0003: CPT

## 2020-06-01 ENCOUNTER — RESULT REVIEW (OUTPATIENT)
Age: 75
End: 2020-06-01

## 2020-06-01 ENCOUNTER — APPOINTMENT (OUTPATIENT)
Dept: GASTROENTEROLOGY | Facility: HOSPITAL | Age: 75
End: 2020-06-01

## 2020-06-01 ENCOUNTER — OUTPATIENT (OUTPATIENT)
Dept: OUTPATIENT SERVICES | Facility: HOSPITAL | Age: 75
LOS: 1 days | End: 2020-06-01
Payer: MEDICARE

## 2020-06-01 DIAGNOSIS — K31.89 OTHER DISEASES OF STOMACH AND DUODENUM: ICD-10-CM

## 2020-06-01 LAB — GLUCOSE BLDC GLUCOMTR-MCNC: 87 MG/DL — SIGNIFICANT CHANGE UP (ref 70–99)

## 2020-06-01 PROCEDURE — 88342 IMHCHEM/IMCYTCHM 1ST ANTB: CPT

## 2020-06-01 PROCEDURE — 88342 IMHCHEM/IMCYTCHM 1ST ANTB: CPT | Mod: 26

## 2020-06-01 PROCEDURE — 82962 GLUCOSE BLOOD TEST: CPT

## 2020-06-01 PROCEDURE — 43242 EGD US FINE NEEDLE BX/ASPIR: CPT

## 2020-06-01 PROCEDURE — 88305 TISSUE EXAM BY PATHOLOGIST: CPT

## 2020-06-01 PROCEDURE — 88305 TISSUE EXAM BY PATHOLOGIST: CPT | Mod: 26

## 2020-06-01 PROCEDURE — 43242 EGD US FINE NEEDLE BX/ASPIR: CPT | Mod: GC

## 2020-06-01 PROCEDURE — 88341 IMHCHEM/IMCYTCHM EA ADD ANTB: CPT | Mod: 26

## 2020-06-01 PROCEDURE — 88341 IMHCHEM/IMCYTCHM EA ADD ANTB: CPT

## 2020-06-15 ENCOUNTER — APPOINTMENT (OUTPATIENT)
Dept: SURGICAL ONCOLOGY | Facility: CLINIC | Age: 75
End: 2020-06-15
Payer: MEDICARE

## 2020-06-15 VITALS
DIASTOLIC BLOOD PRESSURE: 81 MMHG | WEIGHT: 154 LBS | HEART RATE: 130 BPM | OXYGEN SATURATION: 99 % | SYSTOLIC BLOOD PRESSURE: 180 MMHG | BODY MASS INDEX: 24.75 KG/M2 | HEIGHT: 66 IN

## 2020-06-15 PROCEDURE — 99204 OFFICE O/P NEW MOD 45 MIN: CPT

## 2020-06-15 NOTE — HISTORY OF PRESENT ILLNESS
[de-identified] : Jocelynn is a pleasant 74 year-old female here for an initial consultation.  He was referred to Dr. Ike Pabon in March 2020 for evaluation of anemia, GERD and early satiety.  Work up included an EGD which revealed a large submucosal mass without bleeding in the upper stomach.  Biopsy was benign.  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  A colonoscopy was also performed, negative for malignancy or polyps. \par \par CT of the chest, abdomen and pelvis performed on 3/3/20 revealed a 6 cm cystic thick-walled mass which may be arising from the pancreatic tail and invaginating the lesser curvature of the stomach.  Subsequent MRI/MRCP on 3/22/20 revealed a 7.3 cm submucosal lesion the proximal stomach with a large central cystic component, favoring a GIST.  Doubt pancreatic lesion.  No metastatic disease.\par \par On 6/1/20 Dr. Wang Wynne performed an EUS which revealed a 7 cm subepithelial gastric lesion (location unspecified). 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  She was referred to myself as well as Dr. Obie Santos from medical oncology for further management. \par \par Her past medical history includes hypertension, hyperlipidemia, type 2 diabetes, chronic hepatitis C, GERD and cataracts.

## 2020-06-15 NOTE — PHYSICAL EXAM
Repeat CT scan shows improvement in the colitis.  It showed an ileus and this also seems to be improving as the patient is having bowel movements.           [Normal] : supple, no neck mass and thyroid not enlarged [Normal Neck Lymph Nodes] : normal neck lymph nodes  [Normal Groin Lymph Nodes] : normal groin lymph nodes [Normal Supraclavicular Lymph Nodes] : normal supraclavicular lymph nodes [Normal Axillary Lymph Nodes] : normal axillary lymph nodes [Normal] : grossly intact

## 2020-06-15 NOTE — CONSULT LETTER
[Dear  ___] : Dear  [unfilled], [DrHarry  ___] : Dr. LOZANO [Please see my note below.] : Please see my note below. [Consult Closing:] : Thank you very much for allowing me to participate in the care of this patient.  If you have any questions, please do not hesitate to contact me. [Consult Letter:] : I had the pleasure of evaluating your patient, [unfilled]. [Sincerely,] : Sincerely, [FreeTextEntry2] : Ike Pabon MD [FreeTextEntry1] : Jocelynn is a pleasant 74 year-old female here for an initial consultation.  He was referred to Dr. Ike Pabon in March 2020 for evaluation of anemia, GERD and early satiety.  Work up included an EGD which revealed a large submucosal mass without bleeding in the upper stomach.  Biopsy was benign.  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  A colonoscopy was also performed, negative for malignancy or polyps. \par \par CT of the chest, abdomen and pelvis performed on 3/3/20 revealed a 6 cm cystic thick-walled mass which may be arising from the pancreatic tail and invaginating the lesser curvature of the stomach.  Subsequent MRI/MRCP on 3/22/20 revealed a 7.3 cm submucosal lesion the proximal stomach with a large central cystic component, favoring a GIST.  Doubt pancreatic lesion.  No metastatic disease.\par \par On 6/1/20 Dr. Wang Wynne performed an EUS which revealed a 7 cm subepithelial gastric lesion (location unspecified). 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  She was referred to myself as well as Dr. Obie Santos from medical oncology for further management. \par \par Her past medical history includes hypertension, hyperlipidemia, type 2 diabetes, chronic hepatitis C, GERD and cataracts.  She was also COVID 19 (+) on 5/17/2020.  Repeat COVID test 5/30/2020 was negative.  Past surgical history is notable for a limb lengthening surgery in 2010\par \par On exam, there are no palpable abdominal masses. There are no abdominal incisions.\par \par We discussed the plan for a robotic laparoscopic possible open partial gastrectomy.We discussed  the associated risks, benefits, and alternatives of the procedure. We also discussed potential complications and postoperative expectations. She understands and agrees to proceed. [DrHarry ___] : Dr. LOZANO [FreeTextEntry3] : Aj Aiken MD\par Surgical Oncology\par [facility]\par Office: [phone]\par Cell: [phone]\par

## 2020-06-15 NOTE — ASSESSMENT
[FreeTextEntry1] : We discussed the plan for a robotic laparoscopic possible open partial gastrectomy.We discussed  the associated risks, benefits, and alternatives of the procedure. We also discussed potential complications and postoperative expectations. She understands and agrees to proceed.

## 2020-06-25 ENCOUNTER — APPOINTMENT (OUTPATIENT)
Dept: PULMONOLOGY | Facility: CLINIC | Age: 75
End: 2020-06-25
Payer: MEDICARE

## 2020-06-25 VITALS — HEART RATE: 98 BPM

## 2020-06-25 VITALS
SYSTOLIC BLOOD PRESSURE: 173 MMHG | WEIGHT: 149 LBS | OXYGEN SATURATION: 98 % | HEIGHT: 66 IN | HEART RATE: 115 BPM | BODY MASS INDEX: 23.95 KG/M2 | DIASTOLIC BLOOD PRESSURE: 83 MMHG

## 2020-06-25 DIAGNOSIS — Z01.811 ENCOUNTER FOR PREPROCEDURAL RESPIRATORY EXAMINATION: ICD-10-CM

## 2020-06-25 PROCEDURE — 99203 OFFICE O/P NEW LOW 30 MIN: CPT

## 2020-06-25 NOTE — PHYSICAL EXAM
[No Acute Distress] : no acute distress [Normal Oropharynx] : normal oropharynx [No Neck Mass] : no neck mass [Normal Rate/Rhythm] : normal rate/rhythm [Normal Appearance] : normal appearance [No Resp Distress] : no resp distress [Normal S1, S2] : normal s1, s2 [No Murmurs] : no murmurs [Clear to Auscultation Bilaterally] : clear to auscultation bilaterally [No Abnormalities] : no abnormalities [Benign] : benign [No Clubbing] : no clubbing [Normal Gait] : normal gait [FROM] : FROM [No Cyanosis] : no cyanosis [No Edema] : no edema [Normal Color/ Pigmentation] : normal color/ pigmentation [No Focal Deficits] : no focal deficits [Oriented x3] : oriented x3 [Normal Affect] : normal affect

## 2020-06-26 DIAGNOSIS — Z11.59 ENCOUNTER FOR SCREENING FOR OTHER VIRAL DISEASES: ICD-10-CM

## 2020-06-27 ENCOUNTER — TRANSCRIPTION ENCOUNTER (OUTPATIENT)
Age: 75
End: 2020-06-27

## 2020-06-27 LAB — SARS-COV-2 N GENE NPH QL NAA+PROBE: NOT DETECTED

## 2020-06-29 ENCOUNTER — APPOINTMENT (OUTPATIENT)
Dept: PULMONOLOGY | Facility: CLINIC | Age: 75
End: 2020-06-29
Payer: MEDICARE

## 2020-06-29 VITALS — TEMPERATURE: 98.6 F

## 2020-06-29 PROCEDURE — 94729 DIFFUSING CAPACITY: CPT

## 2020-06-29 PROCEDURE — 94010 BREATHING CAPACITY TEST: CPT

## 2020-06-29 PROCEDURE — 94727 GAS DIL/WSHOT DETER LNG VOL: CPT

## 2020-07-02 ENCOUNTER — OUTPATIENT (OUTPATIENT)
Dept: OUTPATIENT SERVICES | Facility: HOSPITAL | Age: 75
LOS: 1 days | End: 2020-07-02
Payer: MEDICARE

## 2020-07-02 VITALS
SYSTOLIC BLOOD PRESSURE: 152 MMHG | WEIGHT: 149.91 LBS | TEMPERATURE: 98 F | DIASTOLIC BLOOD PRESSURE: 83 MMHG | HEART RATE: 92 BPM | HEIGHT: 66 IN | RESPIRATION RATE: 18 BRPM | OXYGEN SATURATION: 98 %

## 2020-07-02 DIAGNOSIS — Z98.49 CATARACT EXTRACTION STATUS, UNSPECIFIED EYE: Chronic | ICD-10-CM

## 2020-07-02 DIAGNOSIS — K08.89 OTHER SPECIFIED DISORDERS OF TEETH AND SUPPORTING STRUCTURES: ICD-10-CM

## 2020-07-02 DIAGNOSIS — C49.9 MALIGNANT NEOPLASM OF CONNECTIVE AND SOFT TISSUE, UNSPECIFIED: ICD-10-CM

## 2020-07-02 DIAGNOSIS — E11.9 TYPE 2 DIABETES MELLITUS WITHOUT COMPLICATIONS: ICD-10-CM

## 2020-07-02 DIAGNOSIS — Z98.890 OTHER SPECIFIED POSTPROCEDURAL STATES: Chronic | ICD-10-CM

## 2020-07-02 DIAGNOSIS — Z01.818 ENCOUNTER FOR OTHER PREPROCEDURAL EXAMINATION: ICD-10-CM

## 2020-07-02 DIAGNOSIS — Z29.9 ENCOUNTER FOR PROPHYLACTIC MEASURES, UNSPECIFIED: ICD-10-CM

## 2020-07-02 LAB
A1C WITH ESTIMATED AVERAGE GLUCOSE RESULT: 5.7 % — HIGH (ref 4–5.6)
ALBUMIN SERPL ELPH-MCNC: 4.8 G/DL — SIGNIFICANT CHANGE UP (ref 3.3–5)
ALP SERPL-CCNC: 46 U/L — SIGNIFICANT CHANGE UP (ref 40–120)
ALT FLD-CCNC: 11 U/L — SIGNIFICANT CHANGE UP (ref 10–45)
ANION GAP SERPL CALC-SCNC: 17 MMOL/L — SIGNIFICANT CHANGE UP (ref 5–17)
ANTIBODY ID 1_1: SIGNIFICANT CHANGE UP
AST SERPL-CCNC: 15 U/L — SIGNIFICANT CHANGE UP (ref 10–40)
BILIRUB SERPL-MCNC: 0.2 MG/DL — SIGNIFICANT CHANGE UP (ref 0.2–1.2)
BLD GP AB SCN SERPL QL: POSITIVE — SIGNIFICANT CHANGE UP
BUN SERPL-MCNC: 13 MG/DL — SIGNIFICANT CHANGE UP (ref 7–23)
CALCIUM SERPL-MCNC: 10.7 MG/DL — HIGH (ref 8.4–10.5)
CHLORIDE SERPL-SCNC: 101 MMOL/L — SIGNIFICANT CHANGE UP (ref 96–108)
CO2 SERPL-SCNC: 20 MMOL/L — LOW (ref 22–31)
CREAT SERPL-MCNC: 0.61 MG/DL — SIGNIFICANT CHANGE UP (ref 0.5–1.3)
DAT POLY-SP REAG RBC QL: NEGATIVE — SIGNIFICANT CHANGE UP
ESTIMATED AVERAGE GLUCOSE: 117 MG/DL — HIGH (ref 68–114)
GLUCOSE SERPL-MCNC: 74 MG/DL — SIGNIFICANT CHANGE UP (ref 70–99)
HCT VFR BLD CALC: 31.6 % — LOW (ref 34.5–45)
HGB BLD-MCNC: 9.1 G/DL — LOW (ref 11.5–15.5)
MCHC RBC-ENTMCNC: 19.6 PG — LOW (ref 27–34)
MCHC RBC-ENTMCNC: 28.8 GM/DL — LOW (ref 32–36)
MCV RBC AUTO: 68.1 FL — LOW (ref 80–100)
NRBC # BLD: 0 /100 WBCS — SIGNIFICANT CHANGE UP (ref 0–0)
PLATELET # BLD AUTO: 309 K/UL — SIGNIFICANT CHANGE UP (ref 150–400)
POTASSIUM SERPL-MCNC: 3.8 MMOL/L — SIGNIFICANT CHANGE UP (ref 3.5–5.3)
POTASSIUM SERPL-SCNC: 3.8 MMOL/L — SIGNIFICANT CHANGE UP (ref 3.5–5.3)
PROT SERPL-MCNC: 8.7 G/DL — HIGH (ref 6–8.3)
RBC # BLD: 4.64 M/UL — SIGNIFICANT CHANGE UP (ref 3.8–5.2)
RBC # FLD: 19.7 % — HIGH (ref 10.3–14.5)
RH IG SCN BLD-IMP: POSITIVE — SIGNIFICANT CHANGE UP
SODIUM SERPL-SCNC: 138 MMOL/L — SIGNIFICANT CHANGE UP (ref 135–145)
WBC # BLD: 6.55 K/UL — SIGNIFICANT CHANGE UP (ref 3.8–10.5)
WBC # FLD AUTO: 6.55 K/UL — SIGNIFICANT CHANGE UP (ref 3.8–10.5)

## 2020-07-02 PROCEDURE — G0463: CPT

## 2020-07-02 PROCEDURE — 86880 COOMBS TEST DIRECT: CPT

## 2020-07-02 PROCEDURE — 86901 BLOOD TYPING SEROLOGIC RH(D): CPT

## 2020-07-02 PROCEDURE — 86905 BLOOD TYPING RBC ANTIGENS: CPT

## 2020-07-02 PROCEDURE — 86077 PHYS BLOOD BANK SERV XMATCH: CPT

## 2020-07-02 PROCEDURE — 86870 RBC ANTIBODY IDENTIFICATION: CPT

## 2020-07-02 PROCEDURE — 86900 BLOOD TYPING SEROLOGIC ABO: CPT

## 2020-07-02 PROCEDURE — 86850 RBC ANTIBODY SCREEN: CPT

## 2020-07-02 NOTE — H&P PST ADULT - ASSESSMENT
DMITRIYI VTE 2.0 SCORE [CLOT updated 2019]    AGE RELATED RISK FACTORS                                                       MOBILITY RELATED FACTORS  [ ] Age 41-60 years                                            (1 Point)                    [ ] Bed rest                                                        (1 Point)  [x ] Age: 61-74 years                                           (2 Points)                  [ ] Plaster cast                                                   (2 Points)  [ ] Age= 75 years                                              (3 Points)                    [ ] Bed bound for more than 72 hours                 (2 Points)    DISEASE RELATED RISK FACTORS                                               GENDER SPECIFIC FACTORS  [ ] Edema in the lower extremities                       (1 Point)              [ ] Pregnancy                                                     (1 Point)  [ ] Varicose veins                                               (1 Point)                     [ ] Post-partum < 6 weeks                                   (1 Point)             [ ] BMI > 25 Kg/m2                                            (1 Point)                     [ ] Hormonal therapy  or oral contraception          (1 Point)                 [ ] Sepsis (in the previous month)                        (1 Point)               [ ] History of pregnancy complications                 (1 point)  [ ] Pneumonia or serious lung disease                                               [ ] Unexplained or recurrent                     (1 Point)           (in the previous month)                               (1 Point)  [ ] Abnormal pulmonary function test                     (1 Point)                 SURGERY RELATED RISK FACTORS  [ ] Acute myocardial infarction                              (1 Point)               [ ]  Section                                             (1 Point)  [ ] Congestive heart failure (in the previous month)  (1 Point)      [ ] Minor surgery                                                  (1 Point)   [ ] Inflammatory bowel disease                             (1 Point)               [ ] Arthroscopic surgery                                        (2 Points)  [ ] Central venous access                                      (2 Points)                [x ] General surgery lasting more than 45 minutes (2 points)  [ ] Malignancy- Present or previous                   (2 Points)                [ ] Elective arthroplasty                                         (5 points)    [ ] Stroke (in the previous month)                          (5 Points)                                                                                                                                                           HEMATOLOGY RELATED FACTORS                                                 TRAUMA RELATED RISK FACTORS  [ ] Prior episodes of VTE                                     (3 Points)                [ ] Fracture of the hip, pelvis, or leg                       (5 Points)  [ ] Positive family history for VTE                         (3 Points)             [ ] Acute spinal cord injury (in the previous month)  (5 Points)  [ ] Prothrombin 60353 A                                     (3 Points)               [ ] Paralysis  (less than 1 month)                             (5 Points)  [ ] Factor V Leiden                                             (3 Points)                  [ ] Multiple Trauma within 1 month                        (5 Points)  [ ] Lupus anticoagulants                                     (3 Points)                                                           [ ] Anticardiolipin antibodies                               (3 Points)                                                       [ ] High homocysteine in the blood                      (3 Points)                                             [ ] Other congenital or acquired thrombophilia      (3 Points)                                                [ ] Heparin induced thrombocytopenia                  (3 Points)                                     Total Score [    4      ]

## 2020-07-02 NOTE — H&P PST ADULT - NSICDXPASTMEDICALHX_GEN_ALL_CORE_FT
PAST MEDICAL HISTORY:  Diabetes mellitus type 2, stable    Hepatitis C resolved 2008    Hyperlipidemia     Hypertension

## 2020-07-02 NOTE — H&P PST ADULT - NSICDXPROBLEM_GEN_ALL_CORE_FT
PROBLEM DIAGNOSES  Problem: Tooth loose  Assessment and Plan: loose tooth bottom middle need to be removed prior to surgery    Problem: Diabetes mellitus  Assessment and Plan: hold metformin 24 hours pre op  lantus decrease 20% evening before surgery      Problem: Malignant neoplasm of connective or other soft tissue  Assessment and Plan: robotic partial gastrectomy  EGD  possible open    Problem: Need for prophylactic measure  Assessment and Plan: The Caprini score indicates this patient is at risk for a VTE event (score 3-5).  Most surgical patients in this group would benefit from pharmacologic prophylaxis.  The surgical team will determine the balance between VTE risk and bleeding risk

## 2020-07-02 NOTE — H&P PST ADULT - ATTENDING COMMENTS
D/w pt plan for robotic poss open partial gastrectomy, EGD    Discussed r/b/a post op expectations poss complications.      Pt understands and agrees to proceed.

## 2020-07-02 NOTE — H&P PST ADULT - HISTORY OF PRESENT ILLNESS
This is a 75 y/o female c/o acid reflux , sonogram revealed + gastric mass, she presents today for robotic gastrectomy, EGD, possible open  COVID swab to be done 7/7/20 at [facility]    *********pt has loose tooth middle bottom, instructed pt to see dentist have the tooth remove prior to surgery**********

## 2020-07-06 NOTE — DISCUSSION/SUMMARY
[FreeTextEntry1] : The patient has high baseline heart rate . She says it is because of claustrophobia and her heart rate goes up when she is in a closed room. When the door was opened and heart rate checked after a few minutes, it came down to 98 per minute.\par The patient has no known pulmonary disease and she has no symptoms at present.\par Will do a PFT and then assess .

## 2020-07-06 NOTE — REASON FOR VISIT
[Initial] : an initial visit [Pre-op Risk Stratification] : pre-op risk stratification [TextBox_44] : prior to gastro intestinal surgery

## 2020-07-06 NOTE — HISTORY OF PRESENT ILLNESS
[Never] : never [TextBox_4] : 74 year old  lady with h/o DM, HBP is being evaluated for pulmonary clearance prior to surgery. She has been diagnosed to have GIST and is going to be operated next week. She has no cough, fever or dyspnea. She is a non smoker. 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 \par Her exercise is limited by pain in left hip.  She has long standing pain since her childhood. She can slowly walk.\par She has lost weight since her GI symptoms started.

## 2020-07-06 NOTE — ADDENDUM
[FreeTextEntry1] : The pulmonary function testing reveals FVC of 1.73  (60% pred), FEV1 of 1.63 (77% Pred).\par  TLC of 78%.  The DLCO is 22.5  (117% Pred).\par Based on the history ,examination and the PFT the patient is cleared for the proposed surgery from the pulmonary point of view.

## 2020-07-06 NOTE — REVIEW OF SYSTEMS
[Recent Wt Loss (___ Lbs)] : ~T recent [unfilled] lb weight loss [GERD] : gerd [Abdominal Pain] : abdominal pain [Negative] : Endocrine [Cough] : no cough [Dyspnea] : no dyspnea

## 2020-07-07 ENCOUNTER — NON-APPOINTMENT (OUTPATIENT)
Age: 75
End: 2020-07-07

## 2020-07-07 ENCOUNTER — APPOINTMENT (OUTPATIENT)
Dept: DISASTER EMERGENCY | Facility: CLINIC | Age: 75
End: 2020-07-07

## 2020-07-07 ENCOUNTER — APPOINTMENT (OUTPATIENT)
Dept: INTERNAL MEDICINE | Facility: CLINIC | Age: 75
End: 2020-07-07
Payer: MEDICARE

## 2020-07-07 VITALS
HEART RATE: 113 BPM | HEIGHT: 66 IN | SYSTOLIC BLOOD PRESSURE: 160 MMHG | DIASTOLIC BLOOD PRESSURE: 70 MMHG | TEMPERATURE: 99 F | BODY MASS INDEX: 23.46 KG/M2 | WEIGHT: 146 LBS | OXYGEN SATURATION: 97 %

## 2020-07-07 VITALS
BODY MASS INDEX: 23.46 KG/M2 | WEIGHT: 146 LBS | DIASTOLIC BLOOD PRESSURE: 70 MMHG | HEART RATE: 113 BPM | OXYGEN SATURATION: 97 % | TEMPERATURE: 99 F | SYSTOLIC BLOOD PRESSURE: 160 MMHG | HEIGHT: 66 IN

## 2020-07-07 LAB — SARS-COV-2 N GENE NPH QL NAA+PROBE: NOT DETECTED

## 2020-07-07 PROCEDURE — 93000 ELECTROCARDIOGRAM COMPLETE: CPT

## 2020-07-07 PROCEDURE — 99214 OFFICE O/P EST MOD 30 MIN: CPT | Mod: 25,PD

## 2020-07-07 NOTE — PHYSICAL EXAM
[No Acute Distress] : no acute distress [Well Nourished] : well nourished [Well-Appearing] : well-appearing [Well Developed] : well developed [Normal Sclera/Conjunctiva] : normal sclera/conjunctiva [PERRL] : pupils equal round and reactive to light [EOMI] : extraocular movements intact [Normal Oropharynx] : the oropharynx was normal [Normal Outer Ear/Nose] : the outer ears and nose were normal in appearance [Supple] : supple [No JVD] : no jugular venous distention [No Lymphadenopathy] : no lymphadenopathy [Thyroid Normal, No Nodules] : the thyroid was normal and there were no nodules present [No Accessory Muscle Use] : no accessory muscle use [No Respiratory Distress] : no respiratory distress  [Clear to Auscultation] : lungs were clear to auscultation bilaterally [Regular Rhythm] : with a regular rhythm [Normal Rate] : normal rate  [Normal S1, S2] : normal S1 and S2 [No Murmur] : no murmur heard [No Carotid Bruits] : no carotid bruits [No Abdominal Bruit] : a ~M bruit was not heard ~T in the abdomen [Pedal Pulses Present] : the pedal pulses are present [No Edema] : there was no peripheral edema [No Varicosities] : no varicosities [Soft] : abdomen soft [No Palpable Aorta] : no palpable aorta [No Extremity Clubbing/Cyanosis] : no extremity clubbing/cyanosis [Non Tender] : non-tender [Non-distended] : non-distended [Normal Bowel Sounds] : normal bowel sounds [No Masses] : no abdominal mass palpated [No HSM] : no HSM [Normal Posterior Cervical Nodes] : no posterior cervical lymphadenopathy [Normal Anterior Cervical Nodes] : no anterior cervical lymphadenopathy [No Spinal Tenderness] : no spinal tenderness [No Joint Swelling] : no joint swelling [No CVA Tenderness] : no CVA  tenderness [Coordination Grossly Intact] : coordination grossly intact [No Rash] : no rash [Grossly Normal Strength/Tone] : grossly normal strength/tone [Normal Affect] : the affect was normal [No Focal Deficits] : no focal deficits [Normal Gait] : normal gait [Deep Tendon Reflexes (DTR)] : deep tendon reflexes were 2+ and symmetric [Normal Insight/Judgement] : insight and judgment were intact

## 2020-07-07 NOTE — HISTORY OF PRESENT ILLNESS
[No Pertinent Cardiac History] : no history of aortic stenosis, atrial fibrillation, coronary artery disease, recent myocardial infarction, or implantable device/pacemaker [No Pertinent Pulmonary History] : no history of asthma, COPD, sleep apnea, or smoking [No Adverse Anesthesia Reaction] : no adverse anesthesia reaction in self or family member [(Patient denies any chest pain, claudication, dyspnea on exertion, orthopnea, palpitations or syncope)] : Patient denies any chest pain, claudication, dyspnea on exertion, orthopnea, palpitations or syncope [Diabetes] : diabetes [Good (7-10 METs)] : Good (7-10 METs) [Aortic Stenosis] : no aortic stenosis [Coronary Artery Disease] : no coronary artery disease [Recent Myocardial Infarction] : no recent myocardial infarction [Atrial Fibrillation] : no atrial fibrillation [Implantable Device/Pacemaker] : no implantable device/pacemaker [COPD] : no COPD [Asthma] : no asthma [Smoker] : not a smoker [Family Member] : no family member with adverse anesthesia reaction/sudden death [Sleep Apnea] : no sleep apnea [Chronic Kidney Disease] : no chronic kidney disease [Chronic Anticoagulation] : no chronic anticoagulation [Self] : no previous adverse anesthesia reaction [FreeTextEntry4] : \par \par anemia- new onset - saw gastro did colonoscope -3/2/2020 and EGD - found to have revealed a large submucosal mass 6/1- FNA was consistent with a GIST- will be going for robotic laparoscopic possible open partial gastrectomy on 7/10/2020\par \par DM- doing better \par -AIc 5.7 7/2/2020 \par - - she was non comp with diet , no hypoglycemic episode , Ophth visit was 10/1/19 , no retinopathy.On Lantus 22 units with Po medications. compliant with diet and medications.\par - eliminated carbs in diet \par -walking in house , wants to but needs  to drive her to park where she can walk , she likes going out to park feels low mood walking in her neighborhood , has been eating a lot staying home \par \par Hyperlipidemia- on atorvastatin denies myalgias.\par \par Hypertension- on current medications enalapril 20 BID. off HCTZ 3/2018 to check BP today \par \par H/o hypercalcemia- monitoring calcium intake , stopped Po ca supplements last ca was normal \par - off hCTZ \par . \par  [FreeTextEntry3] : Aj Reinoso [FreeTextEntry1] : robotic laparoscopic possible open partial gastrectomy [FreeTextEntry2] : 7/10/2020

## 2020-07-07 NOTE — ASSESSMENT
[Ischemic Heart Disease] : Ischemic Heart Disease - No (0) [Congestive Heart Failure] : Congestive Heart Failure - No (0) [High Risk Surgery - Intraperitoneal, Intrathoracic or Supringuinal Vascular Procedures] : High Risk Surgery - Intraperitoneal, Intrathoracic or Supringuinal Vascular Procedures - Yes (1) [Insulin-dependent Diabetic (1 point)] : Insulin-dependent Diabetic - Yes (1) [Creatinine >= 2mg/dL (1 Point)] : Creatinine >= 2mg/dL - No (0) [Prior Cerebrovascular Accident or TIA] : Prior Cerebrovascular Accident or TIA - No (0) [2] : 2 , RCRI Class: III, Risk of Post-Op Cardiac Complications: 10.1%, 95% CI for Risk Estimate: 8.1% - 12.6% [Patient Optimized for Surgery] : Patient optimized for surgery [FreeTextEntry7] : pt will take 7 units lantus night before , hold oral hypoglycemic metformin and glimepremide on day of procedure  [As per surgery] : as per surgery [FreeTextEntry4] : anemia- new onset - saw gastro did colonoscope -3/2/2020 and EGD - found to have revealed a large submucosal mass 6/1- FNA was consistent with a GIST- will be going for robotic laparoscopic possible open partial gastrectomy on 7/10/2020\par -no contraindication for proposed procedure \par \par Hypertension- 160/70 - high \par - on enalapril 20 po bid , change to amlodipine 10  mg daily today \par -HCTZ was discontinued last 3/2018 \par - cardio consult reviewed - stress test negative Controlled, continue current medications, low sodium-DASH diet.\par -f/u  weeks  check bp \par \par Diabetes type 2 , on insulin and oral hypoglycemic - stable \par -AIC 5.7 7/2/2020\par - diet low in carbs, , continue current medications. Monitor Accu-Cheks daily, ophthalmology- due will schedule apt , Monitor for signs of hypoglycemia. \par d/w patient to eat a low carbohydrate diet Continue 1800 kcal ADA diet, avoid rice, pasta, sugar, sweet, soda, juices,start exercise daily 30 minutes \par \par h/o hep c s/p interferon rx , RNA negative - AFP neg \par \par Hyperlipidemia\par -advised low carb diet , avoid fried foods, continue current medications,\par Low-fat diet, avoid red meat, cheese, butter, peanuts and exercise daily for 40 minutes.\par  \par

## 2020-07-09 ENCOUNTER — TRANSCRIPTION ENCOUNTER (OUTPATIENT)
Age: 75
End: 2020-07-09

## 2020-07-10 ENCOUNTER — RESULT REVIEW (OUTPATIENT)
Age: 75
End: 2020-07-10

## 2020-07-10 ENCOUNTER — INPATIENT (INPATIENT)
Facility: HOSPITAL | Age: 75
LOS: 2 days | Discharge: HOME | DRG: 328 | End: 2020-07-13
Attending: SURGERY | Admitting: SURGERY
Payer: MEDICARE

## 2020-07-10 ENCOUNTER — APPOINTMENT (OUTPATIENT)
Dept: SURGICAL ONCOLOGY | Facility: HOSPITAL | Age: 75
End: 2020-07-10

## 2020-07-10 VITALS
RESPIRATION RATE: 18 BRPM | HEART RATE: 100 BPM | DIASTOLIC BLOOD PRESSURE: 74 MMHG | HEIGHT: 66 IN | WEIGHT: 149.91 LBS | TEMPERATURE: 98 F | SYSTOLIC BLOOD PRESSURE: 151 MMHG | OXYGEN SATURATION: 100 %

## 2020-07-10 DIAGNOSIS — C49.9 MALIGNANT NEOPLASM OF CONNECTIVE AND SOFT TISSUE, UNSPECIFIED: ICD-10-CM

## 2020-07-10 DIAGNOSIS — Z98.49 CATARACT EXTRACTION STATUS, UNSPECIFIED EYE: Chronic | ICD-10-CM

## 2020-07-10 DIAGNOSIS — Z98.890 OTHER SPECIFIED POSTPROCEDURAL STATES: Chronic | ICD-10-CM

## 2020-07-10 LAB
GLUCOSE BLDC GLUCOMTR-MCNC: 245 MG/DL — HIGH (ref 70–99)
GLUCOSE BLDC GLUCOMTR-MCNC: 94 MG/DL — SIGNIFICANT CHANGE UP (ref 70–99)

## 2020-07-10 PROCEDURE — 43611 EXCISION OF STOMACH LESION: CPT

## 2020-07-10 PROCEDURE — S2900 ROBOTIC SURGICAL SYSTEM: CPT | Mod: NC

## 2020-07-10 PROCEDURE — 43501 SURGICAL REPAIR OF STOMACH: CPT

## 2020-07-10 PROCEDURE — 49204: CPT

## 2020-07-10 PROCEDURE — 43500 SURGICAL OPENING OF STOMACH: CPT

## 2020-07-10 PROCEDURE — 88307 TISSUE EXAM BY PATHOLOGIST: CPT | Mod: 26

## 2020-07-10 PROCEDURE — 88342 IMHCHEM/IMCYTCHM 1ST ANTB: CPT | Mod: 26

## 2020-07-10 PROCEDURE — 43235 EGD DIAGNOSTIC BRUSH WASH: CPT

## 2020-07-10 RX ORDER — ATORVASTATIN CALCIUM 80 MG/1
1 TABLET, FILM COATED ORAL
Qty: 0 | Refills: 0 | DISCHARGE

## 2020-07-10 RX ORDER — CHOLECALCIFEROL (VITAMIN D3) 125 MCG
1 CAPSULE ORAL
Qty: 0 | Refills: 0 | DISCHARGE

## 2020-07-10 RX ORDER — SODIUM CHLORIDE 9 MG/ML
1000 INJECTION, SOLUTION INTRAVENOUS
Refills: 0 | Status: DISCONTINUED | OUTPATIENT
Start: 2020-07-10 | End: 2020-07-11

## 2020-07-10 RX ORDER — AMLODIPINE BESYLATE 2.5 MG/1
1 TABLET ORAL
Qty: 0 | Refills: 0 | DISCHARGE

## 2020-07-10 RX ORDER — ACETAMINOPHEN 500 MG
1000 TABLET ORAL EVERY 6 HOURS
Refills: 0 | Status: DISCONTINUED | OUTPATIENT
Start: 2020-07-10 | End: 2020-07-11

## 2020-07-10 RX ORDER — DEXTROSE 50 % IN WATER 50 %
25 SYRINGE (ML) INTRAVENOUS ONCE
Refills: 0 | Status: DISCONTINUED | OUTPATIENT
Start: 2020-07-10 | End: 2020-07-13

## 2020-07-10 RX ORDER — METFORMIN HYDROCHLORIDE 850 MG/1
1 TABLET ORAL
Qty: 0 | Refills: 0 | DISCHARGE

## 2020-07-10 RX ORDER — ATORVASTATIN CALCIUM 80 MG/1
40 TABLET, FILM COATED ORAL AT BEDTIME
Refills: 0 | Status: DISCONTINUED | OUTPATIENT
Start: 2020-07-10 | End: 2020-07-13

## 2020-07-10 RX ORDER — DEXTROSE 50 % IN WATER 50 %
15 SYRINGE (ML) INTRAVENOUS ONCE
Refills: 0 | Status: DISCONTINUED | OUTPATIENT
Start: 2020-07-10 | End: 2020-07-13

## 2020-07-10 RX ORDER — HYDROMORPHONE HYDROCHLORIDE 2 MG/ML
0.5 INJECTION INTRAMUSCULAR; INTRAVENOUS; SUBCUTANEOUS
Refills: 0 | Status: DISCONTINUED | OUTPATIENT
Start: 2020-07-10 | End: 2020-07-10

## 2020-07-10 RX ORDER — INSULIN LISPRO 100/ML
VIAL (ML) SUBCUTANEOUS EVERY 6 HOURS
Refills: 0 | Status: DISCONTINUED | OUTPATIENT
Start: 2020-07-10 | End: 2020-07-11

## 2020-07-10 RX ORDER — AMLODIPINE BESYLATE 2.5 MG/1
5 TABLET ORAL AT BEDTIME
Refills: 0 | Status: DISCONTINUED | OUTPATIENT
Start: 2020-07-10 | End: 2020-07-13

## 2020-07-10 RX ORDER — KETOROLAC TROMETHAMINE 30 MG/ML
15 SYRINGE (ML) INJECTION EVERY 6 HOURS
Refills: 0 | Status: DISCONTINUED | OUTPATIENT
Start: 2020-07-10 | End: 2020-07-11

## 2020-07-10 RX ORDER — CHLORHEXIDINE GLUCONATE 213 G/1000ML
1 SOLUTION TOPICAL ONCE
Refills: 0 | Status: DISCONTINUED | OUTPATIENT
Start: 2020-07-10 | End: 2020-07-10

## 2020-07-10 RX ORDER — INSULIN GLARGINE 100 [IU]/ML
22 INJECTION, SOLUTION SUBCUTANEOUS
Qty: 0 | Refills: 0 | DISCHARGE

## 2020-07-10 RX ORDER — GLIMEPIRIDE 1 MG
1 TABLET ORAL
Qty: 0 | Refills: 0 | DISCHARGE

## 2020-07-10 RX ORDER — LIDOCAINE HCL 20 MG/ML
0.2 VIAL (ML) INJECTION ONCE
Refills: 0 | Status: DISCONTINUED | OUTPATIENT
Start: 2020-07-10 | End: 2020-07-10

## 2020-07-10 RX ORDER — CEFAZOLIN SODIUM 1 G
2000 VIAL (EA) INJECTION ONCE
Refills: 0 | Status: DISCONTINUED | OUTPATIENT
Start: 2020-07-10 | End: 2020-07-11

## 2020-07-10 RX ORDER — VITAMIN E 100 UNIT
1 CAPSULE ORAL
Qty: 0 | Refills: 0 | DISCHARGE

## 2020-07-10 RX ORDER — ENOXAPARIN SODIUM 100 MG/ML
40 INJECTION SUBCUTANEOUS DAILY
Refills: 0 | Status: DISCONTINUED | OUTPATIENT
Start: 2020-07-10 | End: 2020-07-13

## 2020-07-10 RX ORDER — PREGABALIN 225 MG/1
1 CAPSULE ORAL
Qty: 0 | Refills: 0 | DISCHARGE

## 2020-07-10 RX ORDER — HYDROMORPHONE HYDROCHLORIDE 2 MG/ML
0.5 INJECTION INTRAMUSCULAR; INTRAVENOUS; SUBCUTANEOUS EVERY 4 HOURS
Refills: 0 | Status: DISCONTINUED | OUTPATIENT
Start: 2020-07-10 | End: 2020-07-11

## 2020-07-10 RX ORDER — DEXTROSE 50 % IN WATER 50 %
12.5 SYRINGE (ML) INTRAVENOUS ONCE
Refills: 0 | Status: DISCONTINUED | OUTPATIENT
Start: 2020-07-10 | End: 2020-07-13

## 2020-07-10 RX ORDER — SODIUM CHLORIDE 9 MG/ML
3 INJECTION INTRAMUSCULAR; INTRAVENOUS; SUBCUTANEOUS EVERY 8 HOURS
Refills: 0 | Status: DISCONTINUED | OUTPATIENT
Start: 2020-07-10 | End: 2020-07-10

## 2020-07-10 RX ORDER — GLUCAGON INJECTION, SOLUTION 0.5 MG/.1ML
1 INJECTION, SOLUTION SUBCUTANEOUS ONCE
Refills: 0 | Status: DISCONTINUED | OUTPATIENT
Start: 2020-07-10 | End: 2020-07-13

## 2020-07-10 RX ORDER — SODIUM CHLORIDE 9 MG/ML
1000 INJECTION, SOLUTION INTRAVENOUS
Refills: 0 | Status: DISCONTINUED | OUTPATIENT
Start: 2020-07-10 | End: 2020-07-13

## 2020-07-10 RX ADMIN — AMLODIPINE BESYLATE 5 MILLIGRAM(S): 2.5 TABLET ORAL at 21:28

## 2020-07-10 RX ADMIN — SODIUM CHLORIDE 3 MILLILITER(S): 9 INJECTION INTRAMUSCULAR; INTRAVENOUS; SUBCUTANEOUS at 11:21

## 2020-07-10 RX ADMIN — Medication 400 MILLIGRAM(S): at 23:22

## 2020-07-10 RX ADMIN — ATORVASTATIN CALCIUM 40 MILLIGRAM(S): 80 TABLET, FILM COATED ORAL at 21:28

## 2020-07-10 RX ADMIN — Medication 15 MILLIGRAM(S): at 21:44

## 2020-07-10 RX ADMIN — Medication 4: at 21:43

## 2020-07-10 RX ADMIN — SODIUM CHLORIDE 100 MILLILITER(S): 9 INJECTION, SOLUTION INTRAVENOUS at 21:27

## 2020-07-10 RX ADMIN — HYDROMORPHONE HYDROCHLORIDE 0.5 MILLIGRAM(S): 2 INJECTION INTRAMUSCULAR; INTRAVENOUS; SUBCUTANEOUS at 18:32

## 2020-07-10 NOTE — BRIEF OPERATIVE NOTE - NSICDXBRIEFPOSTOP_GEN_ALL_CORE_FT
POST-OP DIAGNOSIS:  Gastrointestinal stromal tumor (GIST) of stomach 10-Jul-2020 19:18:53  Dipika Villalpando

## 2020-07-10 NOTE — PRE-OP CHECKLIST - 1.
Emotional support provided to patient . Pre-op instruction and orientation to procedure provided to patient

## 2020-07-10 NOTE — BRIEF OPERATIVE NOTE - OPERATION/FINDINGS
robotic assisted partial gastrectomy   anterior gastrotomy made, GIST tumor stapled from posterior wall, anterior gastrotomy site closed with 20 V-lock suture   hemostasis achieved

## 2020-07-11 LAB
ANION GAP SERPL CALC-SCNC: 10 MMOL/L — SIGNIFICANT CHANGE UP (ref 5–17)
BUN SERPL-MCNC: 10 MG/DL — SIGNIFICANT CHANGE UP (ref 7–23)
CALCIUM SERPL-MCNC: 9.1 MG/DL — SIGNIFICANT CHANGE UP (ref 8.4–10.5)
CHLORIDE SERPL-SCNC: 104 MMOL/L — SIGNIFICANT CHANGE UP (ref 96–108)
CO2 SERPL-SCNC: 25 MMOL/L — SIGNIFICANT CHANGE UP (ref 22–31)
CREAT SERPL-MCNC: 0.61 MG/DL — SIGNIFICANT CHANGE UP (ref 0.5–1.3)
GLUCOSE BLDC GLUCOMTR-MCNC: 117 MG/DL — HIGH (ref 70–99)
GLUCOSE BLDC GLUCOMTR-MCNC: 120 MG/DL — HIGH (ref 70–99)
GLUCOSE BLDC GLUCOMTR-MCNC: 131 MG/DL — HIGH (ref 70–99)
GLUCOSE BLDC GLUCOMTR-MCNC: 164 MG/DL — HIGH (ref 70–99)
GLUCOSE BLDC GLUCOMTR-MCNC: 186 MG/DL — HIGH (ref 70–99)
GLUCOSE BLDC GLUCOMTR-MCNC: 187 MG/DL — HIGH (ref 70–99)
GLUCOSE SERPL-MCNC: 145 MG/DL — HIGH (ref 70–99)
HCT VFR BLD CALC: 25.5 % — LOW (ref 34.5–45)
HCT VFR BLD CALC: 26.6 % — LOW (ref 34.5–45)
HGB BLD-MCNC: 7.6 G/DL — LOW (ref 11.5–15.5)
HGB BLD-MCNC: 7.7 G/DL — LOW (ref 11.5–15.5)
MAGNESIUM SERPL-MCNC: 1.9 MG/DL — SIGNIFICANT CHANGE UP (ref 1.6–2.6)
MCHC RBC-ENTMCNC: 19.4 PG — LOW (ref 27–34)
MCHC RBC-ENTMCNC: 20.1 PG — LOW (ref 27–34)
MCHC RBC-ENTMCNC: 28.9 GM/DL — LOW (ref 32–36)
MCHC RBC-ENTMCNC: 29.8 GM/DL — LOW (ref 32–36)
MCV RBC AUTO: 67 FL — LOW (ref 80–100)
MCV RBC AUTO: 67.3 FL — LOW (ref 80–100)
NRBC # BLD: 0 /100 WBCS — SIGNIFICANT CHANGE UP (ref 0–0)
NRBC # BLD: 0 /100 WBCS — SIGNIFICANT CHANGE UP (ref 0–0)
PHOSPHATE SERPL-MCNC: 2.9 MG/DL — SIGNIFICANT CHANGE UP (ref 2.5–4.5)
PLATELET # BLD AUTO: 323 K/UL — SIGNIFICANT CHANGE UP (ref 150–400)
PLATELET # BLD AUTO: 332 K/UL — SIGNIFICANT CHANGE UP (ref 150–400)
POTASSIUM SERPL-MCNC: 4 MMOL/L — SIGNIFICANT CHANGE UP (ref 3.5–5.3)
POTASSIUM SERPL-SCNC: 4 MMOL/L — SIGNIFICANT CHANGE UP (ref 3.5–5.3)
RBC # BLD: 3.79 M/UL — LOW (ref 3.8–5.2)
RBC # BLD: 3.97 M/UL — SIGNIFICANT CHANGE UP (ref 3.8–5.2)
RBC # FLD: 19.2 % — HIGH (ref 10.3–14.5)
RBC # FLD: 19.5 % — HIGH (ref 10.3–14.5)
SODIUM SERPL-SCNC: 139 MMOL/L — SIGNIFICANT CHANGE UP (ref 135–145)
WBC # BLD: 10.71 K/UL — HIGH (ref 3.8–10.5)
WBC # BLD: 10.79 K/UL — HIGH (ref 3.8–10.5)
WBC # FLD AUTO: 10.71 K/UL — HIGH (ref 3.8–10.5)
WBC # FLD AUTO: 10.79 K/UL — HIGH (ref 3.8–10.5)

## 2020-07-11 RX ORDER — INSULIN LISPRO 100/ML
VIAL (ML) SUBCUTANEOUS AT BEDTIME
Refills: 0 | Status: DISCONTINUED | OUTPATIENT
Start: 2020-07-11 | End: 2020-07-13

## 2020-07-11 RX ORDER — OXYCODONE HYDROCHLORIDE 5 MG/1
5 TABLET ORAL EVERY 4 HOURS
Refills: 0 | Status: DISCONTINUED | OUTPATIENT
Start: 2020-07-11 | End: 2020-07-13

## 2020-07-11 RX ORDER — ACETAMINOPHEN 500 MG
650 TABLET ORAL EVERY 6 HOURS
Refills: 0 | Status: DISCONTINUED | OUTPATIENT
Start: 2020-07-11 | End: 2020-07-13

## 2020-07-11 RX ORDER — INSULIN LISPRO 100/ML
VIAL (ML) SUBCUTANEOUS
Refills: 0 | Status: DISCONTINUED | OUTPATIENT
Start: 2020-07-11 | End: 2020-07-13

## 2020-07-11 RX ORDER — IBUPROFEN 200 MG
600 TABLET ORAL EVERY 6 HOURS
Refills: 0 | Status: DISCONTINUED | OUTPATIENT
Start: 2020-07-11 | End: 2020-07-13

## 2020-07-11 RX ADMIN — AMLODIPINE BESYLATE 5 MILLIGRAM(S): 2.5 TABLET ORAL at 21:33

## 2020-07-11 RX ADMIN — Medication 15 MILLIGRAM(S): at 10:09

## 2020-07-11 RX ADMIN — Medication 400 MILLIGRAM(S): at 05:12

## 2020-07-11 RX ADMIN — Medication 2: at 05:13

## 2020-07-11 RX ADMIN — ATORVASTATIN CALCIUM 40 MILLIGRAM(S): 80 TABLET, FILM COATED ORAL at 21:33

## 2020-07-11 RX ADMIN — Medication 15 MILLIGRAM(S): at 04:10

## 2020-07-11 RX ADMIN — Medication 650 MILLIGRAM(S): at 21:32

## 2020-07-11 RX ADMIN — Medication 400 MILLIGRAM(S): at 11:42

## 2020-07-11 RX ADMIN — ENOXAPARIN SODIUM 40 MILLIGRAM(S): 100 INJECTION SUBCUTANEOUS at 11:42

## 2020-07-11 RX ADMIN — Medication 2: at 01:21

## 2020-07-11 NOTE — CHART NOTE - NSCHARTNOTEFT_GEN_A_CORE
STATUS POST:      SUBJECTIVE: Pt seen at 10:30 approx 4 hours after procedure. Pt tolerated procedure well. Pt reports:  She is feeling very well, her pain is under control, and states she is very satisfied with the staff and the care she has been receiving.  She reports no nausea or vomiting.  She has not passed flatus or had a bowel movement.    SOB:  [ ] YES [x ] NO  Chest Discomfort: [ ] YES [ x] NO    Nausea: [ ] YES [x ] NO           Vomiting: [ ] YES [ x] NO  Flatus: [ ] YES [x ] NO             Bowel Movement: [ ] YES [ x] NO  Diarrhea: [ ] YES [x ] NO         Void: [ ]YES [x ]No  Constipation: [ ] YES [x ] NO     Pain (0-10):              Pain Control Adequate: [ x] YES [ ] NO  Whitt:900      OBJECTIVE:  PHYSICAL EXAM:  Gen: AAOx3, NAD,pleasant  HEENT: NC/AT  RESP: Non labored breathing  ABDOMEN: Soft, NT, ND. Incision site dressings C/D/I.  No masses felt on palpation, no rebound no guarding    Vital Signs Last 24 Hrs  T(C): 36.6 (11 Jul 2020 00:15), Max: 36.7 (10 Jul 2020 11:16)  T(F): 97.8 (11 Jul 2020 00:15), Max: 98.1 (10 Jul 2020 11:16)  HR: 92 (11 Jul 2020 00:15) (90 - 100)  BP: 126/77 (11 Jul 2020 00:15) (122/74 - 151/74)  BP(mean): 93 (10 Jul 2020 19:45) (92 - 99)  RR: 18 (11 Jul 2020 00:15) (15 - 18)  SpO2: 97% (11 Jul 2020 00:15) (92% - 100%)      A/P: 74y Female s/p Gastrectomy, partial, robot-assisted  - Diet: NPO  - Activity:  Ambulate as tolerated  - Labs: CBC, BMP, MAG, PHOS  - Pain medication: Tylenol, Toridol, Dilaudid STATUS POST:  Robot assisted partial gastrectomy    SUBJECTIVE: Pt seen at 10:30 approx 4 hours after procedure. Pt tolerated procedure well. Pt reports:  She is feeling very well, her pain is under control, and states she is very satisfied with the staff and the care she has been receiving.  She reports no nausea or vomiting.  She has not passed flatus or had a bowel movement.    SOB:  [ ] YES [x ] NO  Chest Discomfort: [ ] YES [ x] NO    Nausea: [ ] YES [x ] NO           Vomiting: [ ] YES [ x] NO  Flatus: [ ] YES [x ] NO             Bowel Movement: [ ] YES [ x] NO  Diarrhea: [ ] YES [x ] NO         Void: [ ]YES [x ]No  Constipation: [ ] YES [x ] NO     Pain (0-10):              Pain Control Adequate: [ x] YES [ ] NO  Whitt:900      OBJECTIVE:  PHYSICAL EXAM:  Gen: AAOx3, NAD,pleasant  HEENT: NC/AT  RESP: Non labored breathing  ABDOMEN: Soft, NT, ND. Incision site dressings C/D/I.  No masses felt on palpation, no rebound no guarding    Vital Signs Last 24 Hrs  T(C): 36.6 (11 Jul 2020 00:15), Max: 36.7 (10 Jul 2020 11:16)  T(F): 97.8 (11 Jul 2020 00:15), Max: 98.1 (10 Jul 2020 11:16)  HR: 92 (11 Jul 2020 00:15) (90 - 100)  BP: 126/77 (11 Jul 2020 00:15) (122/74 - 151/74)  BP(mean): 93 (10 Jul 2020 19:45) (92 - 99)  RR: 18 (11 Jul 2020 00:15) (15 - 18)  SpO2: 97% (11 Jul 2020 00:15) (92% - 100%)      A/P: 74y Female s/p Gastrectomy, partial, robot-assisted  - Diet: NPO  - Activity:  Ambulate as tolerated  - Labs: CBC, BMP, MAG, PHOS  - Pain medication: Tylenol, Toridol, Dilaudid

## 2020-07-11 NOTE — PROGRESS NOTE ADULT - SUBJECTIVE AND OBJECTIVE BOX
SURGERY DAILY PROGRESS NOTE:       SUBJECTIVE/ROS: Patient feels well  Denies nausea, vomiting, chest pain, shortness of breath         MEDICATIONS  (STANDING):  acetaminophen  IVPB .. 1000 milliGRAM(s) IV Intermittent every 6 hours  amLODIPine   Tablet 5 milliGRAM(s) Oral at bedtime  atorvastatin 40 milliGRAM(s) Oral at bedtime  ceFAZolin   IVPB 2000 milliGRAM(s) IV Intermittent once  ceFAZolin   IVPB 2000 milliGRAM(s) IV Intermittent once  dextrose 5%. 1000 milliLiter(s) (50 mL/Hr) IV Continuous <Continuous>  dextrose 50% Injectable 12.5 Gram(s) IV Push once  dextrose 50% Injectable 25 Gram(s) IV Push once  dextrose 50% Injectable 25 Gram(s) IV Push once  enoxaparin Injectable 40 milliGRAM(s) SubCutaneous daily  insulin lispro (HumaLOG) corrective regimen sliding scale   SubCutaneous every 6 hours  lactated ringers. 1000 milliLiter(s) (100 mL/Hr) IV Continuous <Continuous>    MEDICATIONS  (PRN):  dextrose 40% Gel 15 Gram(s) Oral once PRN Blood Glucose LESS THAN 70 milliGRAM(s)/deciliter  glucagon  Injectable 1 milliGRAM(s) IntraMuscular once PRN Glucose LESS THAN 70 milligrams/deciliter  HYDROmorphone  Injectable 0.5 milliGRAM(s) IV Push every 4 hours PRN Moderate Pain (4 - 6)      OBJECTIVE:    Vital Signs Last 24 Hrs  T(C): 36.9 (11 Jul 2020 13:07), Max: 36.9 (11 Jul 2020 09:44)  T(F): 98.5 (11 Jul 2020 13:07), Max: 98.5 (11 Jul 2020 09:44)  HR: 98 (11 Jul 2020 13:07) (60 - 100)  BP: 147/69 (11 Jul 2020 13:07) (120/72 - 151/74)  BP(mean): 93 (10 Jul 2020 19:45) (92 - 99)  RR: 18 (11 Jul 2020 13:07) (15 - 18)  SpO2: 94% (11 Jul 2020 13:07) (92% - 100%)        I&O's Detail    10 Jul 2020 07:01  -  11 Jul 2020 07:00  --------------------------------------------------------  IN:    IV PiggyBack: 200 mL    lactated ringers.: 900 mL  Total IN: 1100 mL    OUT:    Indwelling Catheter - Urethral: 1700 mL  Total OUT: 1700 mL    Total NET: -600 mL      11 Jul 2020 07:01  -  11 Jul 2020 13:46  --------------------------------------------------------  IN:    Solution: 100 mL  Total IN: 100 mL    OUT:    Indwelling Catheter - Urethral: 100 mL  Total OUT: 100 mL    Total NET: 0 mL          Daily Height in cm: 167.64 (10 Jul 2020 13:55)    Daily     LABS:                        7.6    10.79 )-----------( 332      ( 11 Jul 2020 08:50 )             25.5     07-11    139  |  104  |  10  ----------------------------<  145<H>  4.0   |  25  |  0.61    Ca    9.1      11 Jul 2020 07:07  Phos  2.9     07-11  Mg     1.9     07-11                    PHYSICAL EXAM:  Constitutional: well developed, well nourished, NAD  Respiratory: Patent airway  Cardiovascular: appears well perfused  Gastrointestinal: abdomen soft, nontender, nondistended. No obvious masses.   Psychiatric: oriented x 3; appropriate BLUE TEAM SURGERY DAILY PROGRESS NOTE:       SUBJECTIVE/ROS: Patient feels well. Resting comfortably in bed  Denies nausea, vomiting, chest pain, shortness of breath         MEDICATIONS  (STANDING):  acetaminophen  IVPB .. 1000 milliGRAM(s) IV Intermittent every 6 hours  amLODIPine   Tablet 5 milliGRAM(s) Oral at bedtime  atorvastatin 40 milliGRAM(s) Oral at bedtime  ceFAZolin   IVPB 2000 milliGRAM(s) IV Intermittent once  ceFAZolin   IVPB 2000 milliGRAM(s) IV Intermittent once  dextrose 5%. 1000 milliLiter(s) (50 mL/Hr) IV Continuous <Continuous>  dextrose 50% Injectable 12.5 Gram(s) IV Push once  dextrose 50% Injectable 25 Gram(s) IV Push once  dextrose 50% Injectable 25 Gram(s) IV Push once  enoxaparin Injectable 40 milliGRAM(s) SubCutaneous daily  insulin lispro (HumaLOG) corrective regimen sliding scale   SubCutaneous every 6 hours  lactated ringers. 1000 milliLiter(s) (100 mL/Hr) IV Continuous <Continuous>    MEDICATIONS  (PRN):  dextrose 40% Gel 15 Gram(s) Oral once PRN Blood Glucose LESS THAN 70 milliGRAM(s)/deciliter  glucagon  Injectable 1 milliGRAM(s) IntraMuscular once PRN Glucose LESS THAN 70 milligrams/deciliter  HYDROmorphone  Injectable 0.5 milliGRAM(s) IV Push every 4 hours PRN Moderate Pain (4 - 6)      OBJECTIVE:    Vital Signs Last 24 Hrs  T(C): 36.9 (11 Jul 2020 13:07), Max: 36.9 (11 Jul 2020 09:44)  T(F): 98.5 (11 Jul 2020 13:07), Max: 98.5 (11 Jul 2020 09:44)  HR: 98 (11 Jul 2020 13:07) (60 - 100)  BP: 147/69 (11 Jul 2020 13:07) (120/72 - 151/74)  BP(mean): 93 (10 Jul 2020 19:45) (92 - 99)  RR: 18 (11 Jul 2020 13:07) (15 - 18)  SpO2: 94% (11 Jul 2020 13:07) (92% - 100%)        I&O's Detail    10 Jul 2020 07:01  -  11 Jul 2020 07:00  --------------------------------------------------------  IN:    IV PiggyBack: 200 mL    lactated ringers.: 900 mL  Total IN: 1100 mL    OUT:    Indwelling Catheter - Urethral: 1700 mL  Total OUT: 1700 mL    Total NET: -600 mL      11 Jul 2020 07:01  -  11 Jul 2020 13:46  --------------------------------------------------------  IN:    Solution: 100 mL  Total IN: 100 mL    OUT:    Indwelling Catheter - Urethral: 100 mL  Total OUT: 100 mL    Total NET: 0 mL          Daily Height in cm: 167.64 (10 Jul 2020 13:55)    Daily     LABS:                        7.6    10.79 )-----------( 332      ( 11 Jul 2020 08:50 )             25.5     07-11    139  |  104  |  10  ----------------------------<  145<H>  4.0   |  25  |  0.61    Ca    9.1      11 Jul 2020 07:07  Phos  2.9     07-11  Mg     1.9     07-11                    PHYSICAL EXAM:  Constitutional: well developed, well nourished, NAD  Respiratory: Patent airway  Cardiovascular: appears well perfused  Gastrointestinal: abdomen soft, nontender, nondistended. No obvious masses.   Psychiatric: oriented x 3; appropriate

## 2020-07-12 ENCOUNTER — TRANSCRIPTION ENCOUNTER (OUTPATIENT)
Age: 75
End: 2020-07-12

## 2020-07-12 LAB
GLUCOSE BLDC GLUCOMTR-MCNC: 130 MG/DL — HIGH (ref 70–99)
GLUCOSE BLDC GLUCOMTR-MCNC: 136 MG/DL — HIGH (ref 70–99)
GLUCOSE BLDC GLUCOMTR-MCNC: 136 MG/DL — HIGH (ref 70–99)
GLUCOSE BLDC GLUCOMTR-MCNC: 145 MG/DL — HIGH (ref 70–99)
HCT VFR BLD CALC: 24.3 % — LOW (ref 34.5–45)
HCT VFR BLD CALC: 25.6 % — LOW (ref 34.5–45)
HGB BLD-MCNC: 7.4 G/DL — LOW (ref 11.5–15.5)
HGB BLD-MCNC: 7.6 G/DL — LOW (ref 11.5–15.5)
MCHC RBC-ENTMCNC: 19.8 PG — LOW (ref 27–34)
MCHC RBC-ENTMCNC: 20.3 PG — LOW (ref 27–34)
MCHC RBC-ENTMCNC: 29.7 GM/DL — LOW (ref 32–36)
MCHC RBC-ENTMCNC: 30.5 GM/DL — LOW (ref 32–36)
MCV RBC AUTO: 66.8 FL — LOW (ref 80–100)
MCV RBC AUTO: 66.8 FL — LOW (ref 80–100)
NRBC # BLD: 0 /100 WBCS — SIGNIFICANT CHANGE UP (ref 0–0)
NRBC # BLD: 0 /100 WBCS — SIGNIFICANT CHANGE UP (ref 0–0)
PLATELET # BLD AUTO: 342 K/UL — SIGNIFICANT CHANGE UP (ref 150–400)
PLATELET # BLD AUTO: 342 K/UL — SIGNIFICANT CHANGE UP (ref 150–400)
RBC # BLD: 3.64 M/UL — LOW (ref 3.8–5.2)
RBC # BLD: 3.83 M/UL — SIGNIFICANT CHANGE UP (ref 3.8–5.2)
RBC # FLD: 19.7 % — HIGH (ref 10.3–14.5)
RBC # FLD: 19.8 % — HIGH (ref 10.3–14.5)
WBC # BLD: 10.3 K/UL — SIGNIFICANT CHANGE UP (ref 3.8–10.5)
WBC # BLD: 10.67 K/UL — HIGH (ref 3.8–10.5)
WBC # FLD AUTO: 10.3 K/UL — SIGNIFICANT CHANGE UP (ref 3.8–10.5)
WBC # FLD AUTO: 10.67 K/UL — HIGH (ref 3.8–10.5)

## 2020-07-12 RX ORDER — ACETAMINOPHEN 500 MG
2 TABLET ORAL
Qty: 32 | Refills: 0
Start: 2020-07-12 | End: 2020-07-15

## 2020-07-12 RX ORDER — OXYCODONE HYDROCHLORIDE 5 MG/1
1 TABLET ORAL
Qty: 10 | Refills: 0
Start: 2020-07-12 | End: 2020-07-16

## 2020-07-12 RX ORDER — OXYCODONE HYDROCHLORIDE 5 MG/1
1 TABLET ORAL
Qty: 0 | Refills: 0 | DISCHARGE
Start: 2020-07-12

## 2020-07-12 RX ORDER — ACETAMINOPHEN 500 MG
2 TABLET ORAL
Qty: 0 | Refills: 0 | DISCHARGE
Start: 2020-07-12

## 2020-07-12 NOTE — PROGRESS NOTE ADULT - SUBJECTIVE AND OBJECTIVE BOX
SURGERY DAILY PROGRESS NOTE:       SUBJECTIVE/ROS: Patient feels well  Denies nausea, vomiting, chest pain, shortness of breath         MEDICATIONS  (STANDING):  amLODIPine   Tablet 5 milliGRAM(s) Oral at bedtime  atorvastatin 40 milliGRAM(s) Oral at bedtime  dextrose 5%. 1000 milliLiter(s) (50 mL/Hr) IV Continuous <Continuous>  dextrose 50% Injectable 12.5 Gram(s) IV Push once  dextrose 50% Injectable 25 Gram(s) IV Push once  dextrose 50% Injectable 25 Gram(s) IV Push once  enoxaparin Injectable 40 milliGRAM(s) SubCutaneous daily  insulin lispro (HumaLOG) corrective regimen sliding scale   SubCutaneous three times a day with meals  insulin lispro (HumaLOG) corrective regimen sliding scale   SubCutaneous at bedtime    MEDICATIONS  (PRN):  acetaminophen   Tablet .. 650 milliGRAM(s) Oral every 6 hours PRN Mild Pain (1 - 3)  dextrose 40% Gel 15 Gram(s) Oral once PRN Blood Glucose LESS THAN 70 milliGRAM(s)/deciliter  glucagon  Injectable 1 milliGRAM(s) IntraMuscular once PRN Glucose LESS THAN 70 milligrams/deciliter  ibuprofen  Tablet. 600 milliGRAM(s) Oral every 6 hours PRN Moderate Pain (4 - 6)  oxyCODONE    IR 5 milliGRAM(s) Oral every 4 hours PRN Severe Pain (7 - 10)      OBJECTIVE:    Vital Signs Last 24 Hrs  T(C): 36.6 (12 Jul 2020 09:19), Max: 37.1 (11 Jul 2020 21:41)  T(F): 97.9 (12 Jul 2020 09:19), Max: 98.7 (11 Jul 2020 21:41)  HR: 99 (12 Jul 2020 09:19) (97 - 104)  BP: 126/71 (12 Jul 2020 09:19) (107/63 - 147/69)  BP(mean): --  RR: 18 (12 Jul 2020 09:19) (18 - 18)  SpO2: 94% (12 Jul 2020 09:19) (94% - 95%)        I&O's Detail    11 Jul 2020 07:01  -  12 Jul 2020 07:00  --------------------------------------------------------  IN:    lactated ringers.: 800 mL    Solution: 100 mL  Total IN: 900 mL    OUT:    Indwelling Catheter - Urethral: 100 mL    Voided: 1300 mL  Total OUT: 1400 mL    Total NET: -500 mL      12 Jul 2020 07:01  -  12 Jul 2020 12:14  --------------------------------------------------------  IN:    Oral Fluid: 240 mL  Total IN: 240 mL    OUT:    Voided: 600 mL  Total OUT: 600 mL    Total NET: -360 mL          Daily     Daily     LABS:                        7.6    10.67 )-----------( 342      ( 12 Jul 2020 08:26 )             25.6     07-11    139  |  104  |  10  ----------------------------<  145<H>  4.0   |  25  |  0.61    Ca    9.1      11 Jul 2020 07:07  Phos  2.9     07-11  Mg     1.9     07-11                    PHYSICAL EXAM:  Constitutional: well developed, well nourished, NAD  Respiratory: no labored respirations  Cardiovascular: appears well perfused  Gastrointestinal: abdomen soft, nontender, nondistended. No obvious masses.  Psychiatric: oriented x 3; appropriate SURGERY DAILY PROGRESS NOTE:       SUBJECTIVE/ROS: Patient feels OK. endorses passing gas but no bowel movement. Wishes to leave in AM tomorrow if possible bc  left for the day already.  Denies nausea, vomiting, chest pain, shortness of breath         MEDICATIONS  (STANDING):  amLODIPine   Tablet 5 milliGRAM(s) Oral at bedtime  atorvastatin 40 milliGRAM(s) Oral at bedtime  dextrose 5%. 1000 milliLiter(s) (50 mL/Hr) IV Continuous <Continuous>  dextrose 50% Injectable 12.5 Gram(s) IV Push once  dextrose 50% Injectable 25 Gram(s) IV Push once  dextrose 50% Injectable 25 Gram(s) IV Push once  enoxaparin Injectable 40 milliGRAM(s) SubCutaneous daily  insulin lispro (HumaLOG) corrective regimen sliding scale   SubCutaneous three times a day with meals  insulin lispro (HumaLOG) corrective regimen sliding scale   SubCutaneous at bedtime    MEDICATIONS  (PRN):  acetaminophen   Tablet .. 650 milliGRAM(s) Oral every 6 hours PRN Mild Pain (1 - 3)  dextrose 40% Gel 15 Gram(s) Oral once PRN Blood Glucose LESS THAN 70 milliGRAM(s)/deciliter  glucagon  Injectable 1 milliGRAM(s) IntraMuscular once PRN Glucose LESS THAN 70 milligrams/deciliter  ibuprofen  Tablet. 600 milliGRAM(s) Oral every 6 hours PRN Moderate Pain (4 - 6)  oxyCODONE    IR 5 milliGRAM(s) Oral every 4 hours PRN Severe Pain (7 - 10)      OBJECTIVE:    Vital Signs Last 24 Hrs  T(C): 36.6 (12 Jul 2020 09:19), Max: 37.1 (11 Jul 2020 21:41)  T(F): 97.9 (12 Jul 2020 09:19), Max: 98.7 (11 Jul 2020 21:41)  HR: 99 (12 Jul 2020 09:19) (97 - 104)  BP: 126/71 (12 Jul 2020 09:19) (107/63 - 147/69)  BP(mean): --  RR: 18 (12 Jul 2020 09:19) (18 - 18)  SpO2: 94% (12 Jul 2020 09:19) (94% - 95%)        I&O's Detail    11 Jul 2020 07:01  -  12 Jul 2020 07:00  --------------------------------------------------------  IN:    lactated ringers.: 800 mL    Solution: 100 mL  Total IN: 900 mL    OUT:    Indwelling Catheter - Urethral: 100 mL    Voided: 1300 mL  Total OUT: 1400 mL    Total NET: -500 mL      12 Jul 2020 07:01  -  12 Jul 2020 12:14  --------------------------------------------------------  IN:    Oral Fluid: 240 mL  Total IN: 240 mL    OUT:    Voided: 600 mL  Total OUT: 600 mL    Total NET: -360 mL          Daily     Daily     LABS:                        7.6    10.67 )-----------( 342      ( 12 Jul 2020 08:26 )             25.6     07-11    139  |  104  |  10  ----------------------------<  145<H>  4.0   |  25  |  0.61    Ca    9.1      11 Jul 2020 07:07  Phos  2.9     07-11  Mg     1.9     07-11                    PHYSICAL EXAM:  Constitutional: well developed, well nourished, NAD  Respiratory: no labored respirations  Cardiovascular: appears well perfused  Gastrointestinal: abdomen soft, nontender, nondistended. No obvious masses.  Psychiatric: oriented x 3; appropriate

## 2020-07-12 NOTE — DISCHARGE NOTE PROVIDER - CARE PROVIDER_API CALL
Aj Aiken  [address] [address]  Phone: [phone]  Fax: [phone]  Follow Up Time: 2 weeks

## 2020-07-12 NOTE — DISCHARGE NOTE PROVIDER - NSDCACTIVITY_GEN_ALL_CORE
No heavy lifting/straining Do not drive or operate machinery/Do not make important decisions/Showering allowed/No heavy lifting/straining

## 2020-07-12 NOTE — DISCHARGE NOTE PROVIDER - NSDCCPTREATMENT_GEN_ALL_CORE_FT
PRINCIPAL PROCEDURE  Procedure: Gastrectomy, partial, robot-assisted  Findings and Treatment: robotic assisted partial gastrectomy   anterior gastrotomy made, GIST tumor stapled from posterior wall, anterior gastrotomy site closed with 20 V-lock suture   hemostasis achieved

## 2020-07-12 NOTE — DISCHARGE NOTE PROVIDER - NSDCMRMEDTOKEN_GEN_ALL_CORE_FT
acetaminophen 325 mg oral tablet: 2 tab(s) orally every 6 hours, As needed, Mild Pain (1 - 3)  amLODIPine 5 mg oral tablet: 1 tab(s) orally once a day (at bedtime)  atorvastatin 40 mg oral tablet: 1 tab(s) orally once a day (at bedtime)  enalapril 20 mg oral tablet: 1 tab(s) orally 2 times a day  glimepiride 4 mg oral tablet: 1 tab(s) orally once a day  Lantus 100 units/mL subcutaneous solution: 22 unit(s) subcutaneous once a day (at bedtime)  metFORMIN 1000 mg oral tablet: 1 tab(s) orally 2 times a day  oxyCODONE 5 mg oral tablet: 1 tab(s) orally every 4 hours, As needed, Severe Pain (7 - 10)  Vitamin B12 1000 mcg oral tablet: 1 tab(s) orally once a day  Vitamin D3 400 intl units (10 mcg) oral capsule: 1 cap(s) orally once a day  vitamin E 200 intl units oral tablet: 1 tab(s) orally once a day acetaminophen 325 mg oral tablet: 2 tab(s) orally every 6 hours, As needed, Mild Pain (1 - 3)  acetaminophen 325 mg oral tablet: 2 tab(s) orally 1 to 4 times a day, As Needed -Mild Pain (1 - 3) - for mild pain MDD:8   amLODIPine 5 mg oral tablet: 1 tab(s) orally once a day (at bedtime)  atorvastatin 40 mg oral tablet: 1 tab(s) orally once a day (at bedtime)  enalapril 20 mg oral tablet: 1 tab(s) orally 2 times a day  glimepiride 4 mg oral tablet: 1 tab(s) orally once a day  Lantus 100 units/mL subcutaneous solution: 22 unit(s) subcutaneous once a day (at bedtime)  metFORMIN 1000 mg oral tablet: 1 tab(s) orally 2 times a day  oxyCODONE 5 mg oral tablet: 1 tab(s) orally every 4 hours, As needed, Severe Pain (7 - 10)  oxyCODONE 5 mg oral tablet: 1 tab(s) orally 1 to 2 times a day, As Needed -Severe Pain (7 - 10) - for moderate pain MDD:3   Vitamin B12 1000 mcg oral tablet: 1 tab(s) orally once a day  Vitamin D3 400 intl units (10 mcg) oral capsule: 1 cap(s) orally once a day  vitamin E 200 intl units oral tablet: 1 tab(s) orally once a day acetaminophen 325 mg oral tablet: 2 tab(s) orally 1 to 4 times a day, As Needed -Mild Pain (1 - 3) - for mild pain MDD:8   amLODIPine 5 mg oral tablet: 1 tab(s) orally once a day (at bedtime)  atorvastatin 40 mg oral tablet: 1 tab(s) orally once a day (at bedtime)  enalapril 20 mg oral tablet: 1 tab(s) orally 2 times a day  glimepiride 4 mg oral tablet: 1 tab(s) orally once a day  Lantus 100 units/mL subcutaneous solution: 22 unit(s) subcutaneous once a day (at bedtime)  metFORMIN 1000 mg oral tablet: 1 tab(s) orally 2 times a day  oxyCODONE 5 mg oral tablet: 1 tab(s) orally 1 to 2 times a day, As Needed -Severe Pain (7 - 10) - for moderate pain MDD:3   Vitamin B12 1000 mcg oral tablet: 1 tab(s) orally once a day  Vitamin D3 400 intl units (10 mcg) oral capsule: 1 cap(s) orally once a day  vitamin E 200 intl units oral tablet: 1 tab(s) orally once a day acetaminophen 325 mg oral tablet: 2 tab(s) orally 1 to 4 times a day, As Needed -Mild Pain (1 - 3) - for mild pain MDD:8   amLODIPine 5 mg oral tablet: 1 tab(s) orally once a day (at bedtime)  atorvastatin 40 mg oral tablet: 1 tab(s) orally once a day (at bedtime)  enalapril 20 mg oral tablet: 1 tab(s) orally 2 times a day  glimepiride 4 mg oral tablet: 1 tab(s) orally once a day  Lantus 100 units/mL subcutaneous solution: 22 unit(s) subcutaneous once a day (at bedtime)  metFORMIN 1000 mg oral tablet: 1 tab(s) orally 2 times a day  oxyCODONE 5 mg oral tablet: 1-2  tab(s) orally every 4-6 hours, As Needed MDD:8  oxyCODONE 5 mg oral tablet: 1 tab(s) orally 1 to 2 times a day, As Needed -Severe Pain (7 - 10) - for moderate pain MDD:3   Vitamin B12 1000 mcg oral tablet: 1 tab(s) orally once a day  Vitamin D3 400 intl units (10 mcg) oral capsule: 1 cap(s) orally once a day  vitamin E 200 intl units oral tablet: 1 tab(s) orally once a day

## 2020-07-12 NOTE — DISCHARGE NOTE PROVIDER - HOSPITAL COURSE
robotic assisted partial gastrectomy, anterior gastrotomy made, GIST tumor stapled from posterior wall, anterior gastrotomy site closed. Pt  was stable and moved from PACU to floors where she's recovering well. Hg noted to be 7.6 on 7/11. H/H was repeated today and found to be stable with a value of 7.4 and 7.6 on repeat measurement. robotic assisted partial gastrectomy, anterior gastrotomy made, GIST tumor stapled from posterior wall, anterior gastrotomy site closed. Pt  was stable and moved from PACU to floors where she's recovering well. H/H was repeated today and found to be stable with a value of 7.4 and 7.6 on repeat measurement. This is a 73 y/o female c/o acid reflux, sonogram revealed + gastric mass.  On 7/10/2020 she underwent a robotic assisted partial gastrectomy. The patient tolerated the procedure well, there were no complications. The patient was extubated in the OR, transferred to the PACU in stable condition and then transferred to a surgical floor. Once bowel function returned, diet was advanced as tolerated. The patient had daily wound care and was seen by physical therapy which recommended discharge to home. The patient's pain was controlled by IV pain medications and then by PO pain medications. The patient was placed back on their home medications. At the time of discharge, the patient was hemodynamically stable, tolerating PO diet, voiding urine, passing stool, ambulating and was comfortable with adequate pain control. The patient was instructed to follow up with Dr. Aiken within 1-2 weeks after discharge. The patient felt comfortable with discharge. The patient was discharged to home. The patient had no other issues.

## 2020-07-12 NOTE — DISCHARGE NOTE PROVIDER - NSDCCPCAREPLAN_GEN_ALL_CORE_FT
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  You may shower-Do not submerge or scrub incision sites.  Please pat dry incisions/dressings.  Leave the white steri strips in place, they will fall off on their own in approximately 5-7 days.  Staples will be removed at your first post op visit.      SECONDARY DISCHARGE DIAGNOSES  Diagnosis: Diabetes mellitus  Assessment and Plan of Treatment:

## 2020-07-12 NOTE — PROGRESS NOTE ADULT - ASSESSMENT
75yo Female s/p Gastrectomy, partial, robot-assisted      PLAN:  - Continue Soft Diet  - Activity:  Ambulate as tolerated  - CBC stable  - Plan for DC today      Tadeo Stock MD  Blue team pager [phone] 73yo Female s/p Gastrectomy, partial, robot-assisted      PLAN:  - Start regular Diet  -Continue pain med regimen  - Activity:  Ambulate as tolerated  - CBC stable; H.6<= 7.4  - Plan for DC tomorrow         Tadeo Stock MD  Blue team pager [phone]

## 2020-07-13 ENCOUNTER — TRANSCRIPTION ENCOUNTER (OUTPATIENT)
Age: 75
End: 2020-07-13

## 2020-07-13 VITALS
DIASTOLIC BLOOD PRESSURE: 83 MMHG | HEART RATE: 92 BPM | SYSTOLIC BLOOD PRESSURE: 146 MMHG | TEMPERATURE: 98 F | RESPIRATION RATE: 18 BRPM | OXYGEN SATURATION: 97 %

## 2020-07-13 LAB
GLUCOSE BLDC GLUCOMTR-MCNC: 125 MG/DL — HIGH (ref 70–99)
HCT VFR BLD CALC: 28.8 % — LOW (ref 34.5–45)
HGB BLD-MCNC: 8.4 G/DL — LOW (ref 11.5–15.5)
MCHC RBC-ENTMCNC: 19.7 PG — LOW (ref 27–34)
MCHC RBC-ENTMCNC: 29.2 GM/DL — LOW (ref 32–36)
MCV RBC AUTO: 67.4 FL — LOW (ref 80–100)
NRBC # BLD: 0 /100 WBCS — SIGNIFICANT CHANGE UP (ref 0–0)
PLATELET # BLD AUTO: 369 K/UL — SIGNIFICANT CHANGE UP (ref 150–400)
RBC # BLD: 4.27 M/UL — SIGNIFICANT CHANGE UP (ref 3.8–5.2)
RBC # FLD: 19.9 % — HIGH (ref 10.3–14.5)
WBC # BLD: 7.46 K/UL — SIGNIFICANT CHANGE UP (ref 3.8–10.5)
WBC # FLD AUTO: 7.46 K/UL — SIGNIFICANT CHANGE UP (ref 3.8–10.5)

## 2020-07-13 PROCEDURE — 88307 TISSUE EXAM BY PATHOLOGIST: CPT

## 2020-07-13 PROCEDURE — 80048 BASIC METABOLIC PNL TOTAL CA: CPT

## 2020-07-13 PROCEDURE — 83735 ASSAY OF MAGNESIUM: CPT

## 2020-07-13 PROCEDURE — 86900 BLOOD TYPING SEROLOGIC ABO: CPT

## 2020-07-13 PROCEDURE — P9016: CPT

## 2020-07-13 PROCEDURE — 82962 GLUCOSE BLOOD TEST: CPT

## 2020-07-13 PROCEDURE — 84100 ASSAY OF PHOSPHORUS: CPT

## 2020-07-13 PROCEDURE — 86850 RBC ANTIBODY SCREEN: CPT

## 2020-07-13 PROCEDURE — 86922 COMPATIBILITY TEST ANTIGLOB: CPT

## 2020-07-13 PROCEDURE — S2900: CPT

## 2020-07-13 PROCEDURE — 88341 IMHCHEM/IMCYTCHM EA ADD ANTB: CPT

## 2020-07-13 PROCEDURE — 86901 BLOOD TYPING SEROLOGIC RH(D): CPT

## 2020-07-13 PROCEDURE — C9399: CPT

## 2020-07-13 PROCEDURE — 86902 BLOOD TYPE ANTIGEN DONOR EA: CPT

## 2020-07-13 PROCEDURE — 85027 COMPLETE CBC AUTOMATED: CPT

## 2020-07-13 PROCEDURE — 86870 RBC ANTIBODY IDENTIFICATION: CPT

## 2020-07-13 PROCEDURE — P9040: CPT

## 2020-07-13 PROCEDURE — C1889: CPT

## 2020-07-13 RX ORDER — OXYCODONE HYDROCHLORIDE 5 MG/1
1 TABLET ORAL
Qty: 15 | Refills: 0
Start: 2020-07-13 | End: 2020-07-19

## 2020-07-13 NOTE — DISCHARGE NOTE NURSING/CASE MANAGEMENT/SOCIAL WORK - PATIENT PORTAL LINK FT
You can access the FollowMyHealth Patient Portal offered by [facility] by registering at the following website: [email or web address]. By joining [facility]’s FollowMyHealth portal, you will also be able to view your health information using other applications (apps) compatible with our system.

## 2020-07-13 NOTE — PROGRESS NOTE ADULT - ASSESSMENT
73 y/o female c/o acid reflux, sonogram revealed + gastric mass.  On 7/10/2020 she underwent a robotic assisted partial gastrectomy.      PLAN:  - reg diet  -Continue pain med regimen  - Activity:  Ambulate as tolerated  - CBC stable; Hggb: 8.4<-7.6<- 7.4  - Plan for DC today        BLUE TEAM   pager [phone]

## 2020-07-17 PROBLEM — E11.9 TYPE 2 DIABETES MELLITUS WITHOUT COMPLICATIONS: Chronic | Status: ACTIVE | Noted: 2020-07-02

## 2020-07-17 PROBLEM — B19.20 UNSPECIFIED VIRAL HEPATITIS C WITHOUT HEPATIC COMA: Chronic | Status: ACTIVE | Noted: 2020-07-02

## 2020-07-17 PROBLEM — I10 ESSENTIAL (PRIMARY) HYPERTENSION: Chronic | Status: ACTIVE | Noted: 2020-07-02

## 2020-07-17 PROBLEM — E78.5 HYPERLIPIDEMIA, UNSPECIFIED: Chronic | Status: ACTIVE | Noted: 2020-07-02

## 2020-07-20 LAB — SURGICAL PATHOLOGY STUDY: SIGNIFICANT CHANGE UP

## 2020-07-22 ENCOUNTER — APPOINTMENT (OUTPATIENT)
Dept: INTERNAL MEDICINE | Facility: CLINIC | Age: 75
End: 2020-07-22
Payer: MEDICARE

## 2020-07-22 DIAGNOSIS — Z90.3 ACQUIRED ABSENCE OF STOMACH [PART OF]: ICD-10-CM

## 2020-07-22 DIAGNOSIS — Z09 ENCOUNTER FOR FOLLOW-UP EXAMINATION AFTER COMPLETED TREATMENT FOR CONDITIONS OTHER THAN MALIGNANT NEOPLASM: ICD-10-CM

## 2020-07-22 PROCEDURE — 99496 TRANSJ CARE MGMT HIGH F2F 7D: CPT | Mod: 95

## 2020-07-22 NOTE — HISTORY OF PRESENT ILLNESS
[Home] : at home, [unfilled] , at the time of the visit. [Medical Office: ([facility])___] : at the medical office located in  [Post-hospitalization from ___ Hospital] : Post-hospitalization from [unfilled] Hospital [Admitted on: ___] : The patient was admitted on [unfilled] [Discharged on ___] : discharged on [unfilled] [Discharge Summary] : discharge summary [Discharge Med List] : discharge medication list [Pertinent Labs] : pertinent labs [Patient Contacted By: ____] : and contacted by [unfilled] [FreeTextEntry4] : on file  [FreeTextEntry2] : "Hospital Course \par This is a 75 y/o female c/o acid reflux, sonogram revealed + gastric mass. On 7/10/2020 she underwent a robotic assisted partial gastrectomy. The patient tolerated the procedure well, there were no complications. The patient was\par extubated in the OR, transferred to the PACU in stable condition and then transferred to a surgical floor. Once bowel function returned, diet was advanced as tolerated. The patient had daily wound care and was seen by physical therapy which recommended discharge to home. The patient's pain was controlled by IV pain medications and then by PO pain medications. The patient was placed back on their home medications. At the time of discharge, the patient was hemodynamically stable, tolerating PO diet, voiding urine, passing stool, ambulating and was comfortable with adequate pain control. The patient was instructed to follow up with Dr. Aiken within 1-2 weeks after discharge. \par The patient felt comfortable with discharge. The patient was discharged to home. The patient had no other issues."\par \par doing better - pain controlled with Tylenol did not had to take oxycodone - tolerating po food and nl BM \par has appt with surgeon tomorrow  , will be seeing DR rPado in 1 month \par \par BP sugar all ok

## 2020-07-22 NOTE — ASSESSMENT
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 Monitor Accu-Cheks daily, ophthalmology- due will schedule apt , Monitor for signs of hypoglycemia. \par d/w patient to eat a low carbohydrate diet Continue 1800 kcal ADA diet, avoid rice, pasta, sugar, sweet, soda, juices,start exercise daily 30 minutes \par \par h/o hep c s/p interferon rx , RNA negative - AFP neg \par \par Hyperlipidemia\par -advised low carb diet , avoid fried foods, continue current medications,\par Low-fat diet, avoid red meat, cheese, butter, peanuts and exercise daily for 40 minutes.\par

## 2020-07-23 ENCOUNTER — APPOINTMENT (OUTPATIENT)
Dept: SURGICAL ONCOLOGY | Facility: CLINIC | Age: 75
End: 2020-07-23
Payer: MEDICARE

## 2020-07-23 VITALS
TEMPERATURE: 98.3 F | SYSTOLIC BLOOD PRESSURE: 161 MMHG | RESPIRATION RATE: 16 BRPM | DIASTOLIC BLOOD PRESSURE: 87 MMHG | HEART RATE: 126 BPM | OXYGEN SATURATION: 98 %

## 2020-07-23 DIAGNOSIS — Z85.831 PERSONAL HISTORY OF MALIGNANT NEOPLASM OF SOFT TISSUE: ICD-10-CM

## 2020-07-23 PROCEDURE — 99024 POSTOP FOLLOW-UP VISIT: CPT

## 2020-07-23 NOTE — REASON FOR VISIT
[Post-Op] : a post-op for [FreeTextEntry2] : status post robotic assisted partial gastrectomy on 7/10/20

## 2020-07-23 NOTE — PHYSICAL EXAM
[de-identified] : her incisions are healing.  There is an area of fluctuance at the periumbilical incision.  On probing, there is fat necrosis expressed.  The is no pus. \par  [FreeTextEntry1] : I have reviewed pertinent imaging, pathology and bloodwork.\par

## 2020-07-23 NOTE — ASSESSMENT
[FreeTextEntry1] : Jocelynn will follow up in 2 weeks.  She will also follow up with Medical Oncology.

## 2020-07-23 NOTE — CONSULT LETTER
[Dear  ___] : Dear  [unfilled], [Consult Letter:] : I had the pleasure of evaluating your patient, [unfilled]. [Consult Closing:] : Thank you very much for allowing me to participate in the care of this patient.  If you have any questions, please do not hesitate to contact me. [Please see my note below.] : Please see my note below. [Sincerely,] : Sincerely, [DrHarry  ___] : Dr. LOZANO [DrHarry ___] : Dr. LOZANO [FreeTextEntry2] : Ike Pabon MD [FreeTextEntry1] : Jocelynn is a pleasant 74 year-old female here for an initial postop visit, status post robotic assisted partial gastrectomy on 7/10/20.  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 \par \par On exam, her incisions are healing.  There is an area of fluctuance at the periumbilical incision.  On probing, there is fat necrosis expressed.  The is no pus. \par \par Jocelynn will follow up in 2 weeks.  She will also follow up with Medical Oncology. [FreeTextEntry3] : Aj Aiken MD\par Surgical Oncology\par [facility]\par Office: [phone]\par Cell: [phone]\par

## 2020-07-23 NOTE — HISTORY OF PRESENT ILLNESS
[de-identified] : Jocelynn is a pleasant 74 year-old female here for an initial postop visit, status post robotic assisted partial gastrectomy on 7/10/20.  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 \par \par Previous pertinent history is as follows:\par \par She was initially seen in consultation on 6/15/20.  She was referred to Dr. Ike Pabon in March 2020 for evaluation of anemia, GERD and early satiety.  Work up included an EGD which revealed a large submucosal mass without bleeding in the upper stomach.  Biopsy was benign.  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  A colonoscopy was also performed, negative for malignancy or polyps. \par \par CT of the chest, abdomen and pelvis performed on 3/3/20 revealed a 6 cm cystic thick-walled mass which may be arising from the pancreatic tail and invaginating the lesser curvature of the stomach.  Subsequent MRI/MRCP on 3/22/20 revealed a 7.3 cm submucosal lesion the proximal stomach with a large central cystic component, favoring a GIST.  Doubt pancreatic lesion.  No metastatic disease.\par \par On 6/1/20 Dr. Wang Wynne performed an EUS which revealed a 7 cm subepithelial gastric lesion (location unspecified). 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  She was referred to myself as well as Dr. Obie Santos from medical oncology for further management. \par \par Her past medical history includes hypertension, hyperlipidemia, type 2 diabetes, chronic hepatitis C, GERD and cataracts.

## 2020-08-12 ENCOUNTER — RX RENEWAL (OUTPATIENT)
Age: 75
End: 2020-08-12

## 2020-08-19 ENCOUNTER — OUTPATIENT (OUTPATIENT)
Dept: OUTPATIENT SERVICES | Facility: HOSPITAL | Age: 75
LOS: 1 days | Discharge: HOME | End: 2020-08-19

## 2020-08-19 DIAGNOSIS — C49.0 MALIGNANT NEOPLASM OF CONNECTIVE AND SOFT TISSUE OF HEAD, FACE AND NECK: ICD-10-CM

## 2020-08-19 DIAGNOSIS — Z98.49 CATARACT EXTRACTION STATUS, UNSPECIFIED EYE: Chronic | ICD-10-CM

## 2020-08-19 DIAGNOSIS — Z98.890 OTHER SPECIFIED POSTPROCEDURAL STATES: Chronic | ICD-10-CM

## 2020-08-20 ENCOUNTER — APPOINTMENT (OUTPATIENT)
Dept: SURGICAL ONCOLOGY | Facility: CLINIC | Age: 75
End: 2020-08-20
Payer: MEDICARE

## 2020-08-20 VITALS
BODY MASS INDEX: 23.3 KG/M2 | WEIGHT: 145 LBS | SYSTOLIC BLOOD PRESSURE: 193 MMHG | RESPIRATION RATE: 15 BRPM | HEIGHT: 66 IN | HEART RATE: 125 BPM | DIASTOLIC BLOOD PRESSURE: 82 MMHG | OXYGEN SATURATION: 96 %

## 2020-08-20 PROCEDURE — 99024 POSTOP FOLLOW-UP VISIT: CPT

## 2020-08-21 NOTE — CONSULT LETTER
[Dear  ___] : Dear  [unfilled], [Please see my note below.] : Please see my note below. [Consult Letter:] : I had the pleasure of evaluating your patient, [unfilled]. [Consult Closing:] : Thank you very much for allowing me to participate in the care of this patient.  If you have any questions, please do not hesitate to contact me. [Sincerely,] : Sincerely, [DrHarry  ___] : Dr. LOZANO [DrHarry ___] : Dr. LOZANO [FreeTextEntry1] : Jocelynn is a pleasant 74 year-old female who returns for postop care, status post robotic assisted partial gastrectomy on 7/10/20.  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  Some fat necrosis was expressed from the incision during her initial postop visit on 7/23/20.\par \par She is scheduled to consult with Dr. Obie Santos from medical oncology on 8/24/20.\par \par On exam, his incisions are all healing well.\par \par Jocelynn will follow me in January 2021 upon completion of surveillance CT scan. [FreeTextEntry2] : Ike Pabon MD [FreeTextEntry3] : Aj Aiken MD\par Surgical Oncology\par [facility]\par Office: [phone]\par Cell: [phone]\par

## 2020-08-21 NOTE — HISTORY OF PRESENT ILLNESS
[de-identified] : Jocelynn is a pleasant 74 year-old female who returns for postop care, status post robotic assisted partial gastrectomy on 7/10/20.  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  Some fat necrosis was expressed from the incision during her initial postop visit on 7/23/20.\par \par She is scheduled to consult with Dr. Obie Santos from medical oncology on 8/24/20.\par \par Previous pertinent history is as follows:\par \par She was initially seen in consultation on 6/15/20.  She was referred to Dr. Ike Pabon in March 2020 for evaluation of anemia, GERD and early satiety.  Work up included an EGD which revealed a large submucosal mass without bleeding in the upper stomach.  Biopsy was benign.  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  A colonoscopy was also performed, negative for malignancy or polyps. \par \par CT of the chest, abdomen and pelvis performed on 3/3/20 revealed a 6 cm cystic thick-walled mass which may be arising from the pancreatic tail and invaginating the lesser curvature of the stomach.  Subsequent MRI/MRCP on 3/22/20 revealed a 7.3 cm submucosal lesion the proximal stomach with a large central cystic component, favoring a GIST.  Doubt pancreatic lesion.  No metastatic disease.\par \par On 6/1/20 Dr. Wang Wynne performed an EUS which revealed a 7 cm subepithelial gastric lesion (location unspecified). 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  She was referred to myself as well as Dr. Obie Santos from medical oncology for further management. \par \par Her past medical history includes hypertension, hyperlipidemia, type 2 diabetes, chronic hepatitis C, GERD and cataracts.

## 2020-08-24 ENCOUNTER — RESULT REVIEW (OUTPATIENT)
Age: 75
End: 2020-08-24

## 2020-08-24 ENCOUNTER — LABORATORY RESULT (OUTPATIENT)
Age: 75
End: 2020-08-24

## 2020-08-24 ENCOUNTER — OUTPATIENT (OUTPATIENT)
Dept: OUTPATIENT SERVICES | Facility: HOSPITAL | Age: 75
LOS: 1 days | End: 2020-08-24
Payer: MEDICARE

## 2020-08-24 ENCOUNTER — APPOINTMENT (OUTPATIENT)
Dept: HEMATOLOGY ONCOLOGY | Facility: CLINIC | Age: 75
End: 2020-08-24
Payer: MEDICARE

## 2020-08-24 VITALS
TEMPERATURE: 98.5 F | OXYGEN SATURATION: 97 % | DIASTOLIC BLOOD PRESSURE: 85 MMHG | HEART RATE: 126 BPM | HEIGHT: 65.67 IN | WEIGHT: 148.15 LBS | RESPIRATION RATE: 17 BRPM | BODY MASS INDEX: 24.1 KG/M2 | SYSTOLIC BLOOD PRESSURE: 191 MMHG

## 2020-08-24 DIAGNOSIS — Z98.49 CATARACT EXTRACTION STATUS, UNSPECIFIED EYE: Chronic | ICD-10-CM

## 2020-08-24 DIAGNOSIS — Z98.890 OTHER SPECIFIED POSTPROCEDURAL STATES: Chronic | ICD-10-CM

## 2020-08-24 DIAGNOSIS — C49.A0 GASTROINTESTINAL STROMAL TUMOR, UNSPECIFIED SITE: ICD-10-CM

## 2020-08-24 LAB
ANTIBODY ID 1_1: SIGNIFICANT CHANGE UP
BASOPHILS # BLD AUTO: 0.02 K/UL — SIGNIFICANT CHANGE UP (ref 0–0.2)
BASOPHILS NFR BLD AUTO: 0.4 % — SIGNIFICANT CHANGE UP (ref 0–2)
BLD GP AB SCN SERPL QL: POSITIVE — SIGNIFICANT CHANGE UP
DAT POLY-SP REAG RBC QL: NEGATIVE — SIGNIFICANT CHANGE UP
EOSINOPHIL # BLD AUTO: 0.04 K/UL — SIGNIFICANT CHANGE UP (ref 0–0.5)
EOSINOPHIL NFR BLD AUTO: 0.8 % — SIGNIFICANT CHANGE UP (ref 0–6)
FERRITIN SERPL-MCNC: 5 NG/ML
FOLATE SERPL-MCNC: 9.4 NG/ML
HCT VFR BLD CALC: 29.8 % — LOW (ref 34.5–45)
HCV AB SER QL: REACTIVE
HCV S/CO RATIO: 7.31 S/CO
HGB BLD-MCNC: 8.8 G/DL — LOW (ref 11.5–15.5)
IMM GRANULOCYTES NFR BLD AUTO: 0 % — SIGNIFICANT CHANGE UP (ref 0–1.5)
IRON SATN MFR SERPL: 4 %
IRON SERPL-MCNC: 14 UG/DL
LYMPHOCYTES # BLD AUTO: 1.32 K/UL — SIGNIFICANT CHANGE UP (ref 1–3.3)
LYMPHOCYTES # BLD AUTO: 26.5 % — SIGNIFICANT CHANGE UP (ref 13–44)
MCHC RBC-ENTMCNC: 19.4 PG — LOW (ref 27–34)
MCHC RBC-ENTMCNC: 29.5 GM/DL — LOW (ref 32–36)
MCV RBC AUTO: 65.8 FL — LOW (ref 80–100)
MONOCYTES # BLD AUTO: 0.48 K/UL — SIGNIFICANT CHANGE UP (ref 0–0.9)
MONOCYTES NFR BLD AUTO: 9.6 % — SIGNIFICANT CHANGE UP (ref 2–14)
NEUTROPHILS # BLD AUTO: 3.13 K/UL — SIGNIFICANT CHANGE UP (ref 1.8–7.4)
NEUTROPHILS NFR BLD AUTO: 62.7 % — SIGNIFICANT CHANGE UP (ref 43–77)
NRBC # BLD: 0 /100 WBCS — SIGNIFICANT CHANGE UP (ref 0–0)
PLATELET # BLD AUTO: 413 K/UL — HIGH (ref 150–400)
RBC # BLD: 4.53 M/UL — SIGNIFICANT CHANGE UP (ref 3.8–5.2)
RBC # FLD: 21 % — HIGH (ref 10.3–14.5)
RH IG SCN BLD-IMP: POSITIVE — SIGNIFICANT CHANGE UP
TIBC SERPL-MCNC: 369 UG/DL
UIBC SERPL-MCNC: 354 UG/DL
VIT B12 SERPL-MCNC: 1012 PG/ML
WBC # BLD: 4.99 K/UL — SIGNIFICANT CHANGE UP (ref 3.8–10.5)
WBC # FLD AUTO: 4.99 K/UL — SIGNIFICANT CHANGE UP (ref 3.8–10.5)

## 2020-08-24 PROCEDURE — 86870 RBC ANTIBODY IDENTIFICATION: CPT

## 2020-08-24 PROCEDURE — 86900 BLOOD TYPING SEROLOGIC ABO: CPT

## 2020-08-24 PROCEDURE — 86850 RBC ANTIBODY SCREEN: CPT

## 2020-08-24 PROCEDURE — 86077 PHYS BLOOD BANK SERV XMATCH: CPT

## 2020-08-24 PROCEDURE — 99205 OFFICE O/P NEW HI 60 MIN: CPT

## 2020-08-24 PROCEDURE — 86880 COOMBS TEST DIRECT: CPT

## 2020-08-24 PROCEDURE — 86922 COMPATIBILITY TEST ANTIGLOB: CPT

## 2020-08-26 LAB
HCV RNA SERPL NAA DL=5-ACNC: NOT DETECTED
HCV RNA SERPL NAA+PROBE-LOG IU: NOT DETECTED LOG10IU/ML

## 2020-08-26 NOTE — HISTORY OF PRESENT ILLNESS
[Disease: _____________________] : Disease: [unfilled] [T: ___] : T[unfilled] [N: ___] : N[unfilled] [M: ___] : M[unfilled] [AJCC Stage: ____] : AJCC Stage: [unfilled] [de-identified] : 70 y/o F with PMH of hypertension, hyperlipidemia, type 2 diabetes, chronic hepatitis C, GERD and cataracts who in March 2020, noted to have issues with anemia, GERD and early satiety and work up included an EGD (a large submucosal mass without bleeding in the upper stomach. 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  A CT of the chest, abdomen and pelvis performed on 3/3/20 revealed a 6 cm cystic thick-walled mass which may be arising from the pancreatic tail and invaginating the lesser curvature of the stomach. Subsequent MRI/MRCP on 3/22/20 revealed a 7.3 cm submucosal lesion the proximal stomach with a large central cystic component, favoring a GIST. Doubt pancreatic lesion. No metastatic disease.  On 6/1/20 Dr. Wang Wynne performed an EUS which revealed a 7 cm subepithelial gastric lesion (location unspecified). 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  Patient presents to us to discuss oncologic care on 8/24/2020.  \par \par Family Hx:  Maternal cousin Breast Ca\par Social Hx:  no smoking, no alcohol use, former  (retired >10 years) \par Surgical Hx: left leg lengthening 2010\par Health Maintenance: colonoscopy 3/2020, Mammogram 2019\par \par PCP: Dr. 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  Eating well, no weight loss. \par Endorses fatigue with prolonged activity, worried about anemia.  [FreeTextEntry1] : robotic assisted partial gastrectomy on 7/10/20

## 2020-08-26 NOTE — PHYSICAL EXAM
[Restricted in physically strenuous activity but ambulatory and able to carry out work of a light or sedentary nature] : Status 1- Restricted in physically strenuous activity but ambulatory and able to carry out work of a light or sedentary nature, e.g., light house work, office work [Normal] : affect appropriate [de-identified] : well-healed laparoscopic scars on abdomen

## 2020-08-26 NOTE — CONSULT LETTER
[Dear  ___] : Dear  [unfilled], [Consult Letter:] : I had the pleasure of evaluating your patient, [unfilled]. [Consult Closing:] : Thank you very much for allowing me to participate in the care of this patient.  If you have any questions, please do not hesitate to contact me. [Please see my note below.] : Please see my note below. [Sincerely,] : Sincerely, [FreeTextEntry3] : Obie Santos MD, FACP \par  of Medicine \par Division of Hematology/Oncology\par [facility]\par [facility] \par [address]\[place] [address]\par Tel: [phone]\par Fax: [phone]\par \par \par

## 2020-08-26 NOTE — REASON FOR VISIT
[Initial Consultation] : an initial consultation [FreeTextEntry2] : GIST  [TWNoteComboBox1] : [place]

## 2020-08-26 NOTE — REVIEW OF SYSTEMS
[Fatigue] : fatigue [Negative] : Heme/Lymph [Fever] : no fever [Chills] : no chills [Vision Problems] : no vision problems [Eye Pain] : no eye pain [Chest Pain] : no chest pain [Odynophagia] : no odynophagia [Dysphagia] : no dysphagia [Cough] : no cough [Shortness Of Breath] : no shortness of breath [Palpitations] : no palpitations [Abdominal Pain] : no abdominal pain [Diarrhea] : no diarrhea [Vomiting] : no vomiting [Muscle Pain] : no muscle pain [Joint Pain] : no joint pain [Confused] : no confusion [Dizziness] : no dizziness

## 2020-09-09 ENCOUNTER — APPOINTMENT (OUTPATIENT)
Dept: CT IMAGING | Facility: IMAGING CENTER | Age: 75
End: 2020-09-09
Payer: MEDICARE

## 2020-09-09 ENCOUNTER — OUTPATIENT (OUTPATIENT)
Dept: OUTPATIENT SERVICES | Facility: HOSPITAL | Age: 75
LOS: 1 days | End: 2020-09-09
Payer: MEDICARE

## 2020-09-09 DIAGNOSIS — Z98.49 CATARACT EXTRACTION STATUS, UNSPECIFIED EYE: Chronic | ICD-10-CM

## 2020-09-09 DIAGNOSIS — C49.A0 GASTROINTESTINAL STROMAL TUMOR, UNSPECIFIED SITE: ICD-10-CM

## 2020-09-09 DIAGNOSIS — Z98.890 OTHER SPECIFIED POSTPROCEDURAL STATES: Chronic | ICD-10-CM

## 2020-09-09 PROCEDURE — 74177 CT ABD & PELVIS W/CONTRAST: CPT

## 2020-09-09 PROCEDURE — 74177 CT ABD & PELVIS W/CONTRAST: CPT | Mod: 26

## 2020-09-20 ENCOUNTER — RX RENEWAL (OUTPATIENT)
Age: 75
End: 2020-09-20

## 2020-09-21 ENCOUNTER — RX RENEWAL (OUTPATIENT)
Age: 75
End: 2020-09-21

## 2020-09-30 ENCOUNTER — OUTPATIENT (OUTPATIENT)
Dept: OUTPATIENT SERVICES | Facility: HOSPITAL | Age: 75
LOS: 1 days | Discharge: HOME | End: 2020-09-30

## 2020-09-30 DIAGNOSIS — C49.0 MALIGNANT NEOPLASM OF CONNECTIVE AND SOFT TISSUE OF HEAD, FACE AND NECK: ICD-10-CM

## 2020-09-30 DIAGNOSIS — Z98.890 OTHER SPECIFIED POSTPROCEDURAL STATES: Chronic | ICD-10-CM

## 2020-09-30 DIAGNOSIS — Z98.49 CATARACT EXTRACTION STATUS, UNSPECIFIED EYE: Chronic | ICD-10-CM

## 2020-10-05 ENCOUNTER — RESULT REVIEW (OUTPATIENT)
Age: 75
End: 2020-10-05

## 2020-10-05 ENCOUNTER — APPOINTMENT (OUTPATIENT)
Dept: HEMATOLOGY ONCOLOGY | Facility: CLINIC | Age: 75
End: 2020-10-05
Payer: MEDICARE

## 2020-10-05 VITALS
SYSTOLIC BLOOD PRESSURE: 124 MMHG | DIASTOLIC BLOOD PRESSURE: 80 MMHG | BODY MASS INDEX: 24.26 KG/M2 | RESPIRATION RATE: 18 BRPM | HEART RATE: 122 BPM | TEMPERATURE: 98 F | WEIGHT: 148.81 LBS | OXYGEN SATURATION: 98 %

## 2020-10-05 DIAGNOSIS — Z01.818 ENCOUNTER FOR OTHER PREPROCEDURAL EXAMINATION: ICD-10-CM

## 2020-10-05 DIAGNOSIS — R92.2 INCONCLUSIVE MAMMOGRAM: ICD-10-CM

## 2020-10-05 DIAGNOSIS — G89.29 PAIN IN LEFT KNEE: ICD-10-CM

## 2020-10-05 DIAGNOSIS — M25.562 PAIN IN LEFT KNEE: ICD-10-CM

## 2020-10-05 LAB
BASOPHILS # BLD AUTO: 0.03 K/UL — SIGNIFICANT CHANGE UP (ref 0–0.2)
BASOPHILS NFR BLD AUTO: 0.5 % — SIGNIFICANT CHANGE UP (ref 0–2)
EOSINOPHIL # BLD AUTO: 0.11 K/UL — SIGNIFICANT CHANGE UP (ref 0–0.5)
EOSINOPHIL NFR BLD AUTO: 1.9 % — SIGNIFICANT CHANGE UP (ref 0–6)
HCT VFR BLD CALC: 36.4 % — SIGNIFICANT CHANGE UP (ref 34.5–45)
HGB BLD-MCNC: 11.3 G/DL — LOW (ref 11.5–15.5)
IMM GRANULOCYTES NFR BLD AUTO: 0.5 % — SIGNIFICANT CHANGE UP (ref 0–1.5)
LYMPHOCYTES # BLD AUTO: 2.33 K/UL — SIGNIFICANT CHANGE UP (ref 1–3.3)
LYMPHOCYTES # BLD AUTO: 40.5 % — SIGNIFICANT CHANGE UP (ref 13–44)
MCHC RBC-ENTMCNC: 22.2 PG — LOW (ref 27–34)
MCHC RBC-ENTMCNC: 31 G/DL — LOW (ref 32–36)
MCV RBC AUTO: 71.7 FL — LOW (ref 80–100)
MONOCYTES # BLD AUTO: 0.63 K/UL — SIGNIFICANT CHANGE UP (ref 0–0.9)
MONOCYTES NFR BLD AUTO: 11 % — SIGNIFICANT CHANGE UP (ref 2–14)
NEUTROPHILS # BLD AUTO: 2.62 K/UL — SIGNIFICANT CHANGE UP (ref 1.8–7.4)
NEUTROPHILS NFR BLD AUTO: 45.6 % — SIGNIFICANT CHANGE UP (ref 43–77)
NRBC # BLD: 0 /100 WBCS — SIGNIFICANT CHANGE UP (ref 0–0)
PLATELET # BLD AUTO: 268 K/UL — SIGNIFICANT CHANGE UP (ref 150–400)
RBC # BLD: 5.08 M/UL — SIGNIFICANT CHANGE UP (ref 3.8–5.2)
RBC # FLD: 27 % — HIGH (ref 10.3–14.5)
WBC # BLD: 5.75 K/UL — SIGNIFICANT CHANGE UP (ref 3.8–10.5)
WBC # FLD AUTO: 5.75 K/UL — SIGNIFICANT CHANGE UP (ref 3.8–10.5)

## 2020-10-05 PROCEDURE — 99213 OFFICE O/P EST LOW 20 MIN: CPT

## 2020-10-06 PROBLEM — Z01.818 PREOPERATIVE EXAMINATION: Status: RESOLVED | Noted: 2020-01-13 | Resolved: 2020-10-06

## 2020-10-06 PROBLEM — R92.2 DENSE BREASTS: Status: ACTIVE | Noted: 2018-06-18

## 2020-10-06 RX ORDER — OXYCODONE 5 MG/1
5 TABLET ORAL
Qty: 15 | Refills: 0 | Status: DISCONTINUED | COMMUNITY
Start: 2020-07-13

## 2020-10-06 NOTE — HISTORY OF PRESENT ILLNESS
[Date: ____________] : Patient's last distress assessment performed on [unfilled]. [4 - Distress Level] : Distress Level: 4 [Patient given social work contact information and resource sheet] : Patient was given social work contact information and resource sheet [Disease: _____________________] : Disease: [unfilled] [T: ___] : T[unfilled] [N: ___] : N[unfilled] [M: ___] : M[unfilled] [AJCC Stage: ____] : AJCC Stage: [unfilled] [de-identified] : 72 y/o F with PMH of hypertension, hyperlipidemia, type 2 diabetes, chronic hepatitis C, GERD and cataracts who in March 2020, noted to have issues with anemia, GERD and early satiety and work up included an EGD (a large submucosal mass without bleeding in the upper stomach. 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  A CT of the chest, abdomen and pelvis performed on 3/3/20 revealed a 6 cm cystic thick-walled mass which may be arising from the pancreatic tail and invaginating the lesser curvature of the stomach. Subsequent MRI/MRCP on 3/22/20 revealed a 7.3 cm submucosal lesion the proximal stomach with a large central cystic component, favoring a GIST. Doubt pancreatic lesion. No metastatic disease.  On 6/1/20 Dr. Wang Wynne performed an EUS which revealed a 7 cm subepithelial gastric lesion (location unspecified). 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  Patient presents to us to discuss oncologic care on 8/24/2020 and placed on surveillance \par \par PCP: Dr. 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  Started on PO iron and Vitamin C which she has been taking daily and tolerating without issue.  No nausea, constipation.

## 2020-10-06 NOTE — REVIEW OF SYSTEMS
[Fatigue] : fatigue [Negative] : Heme/Lymph [Fever] : no fever [Chills] : no chills [Eye Pain] : no eye pain [Vision Problems] : no vision problems [Dysphagia] : no dysphagia [Odynophagia] : no odynophagia [Chest Pain] : no chest pain [Palpitations] : no palpitations [Shortness Of Breath] : no shortness of breath [Cough] : no cough [Abdominal Pain] : no abdominal pain [Vomiting] : no vomiting [Diarrhea] : no diarrhea [Joint Pain] : no joint pain [Muscle Pain] : no muscle pain [Confused] : no confusion [Dizziness] : no dizziness

## 2020-10-06 NOTE — PHYSICAL EXAM
[Restricted in physically strenuous activity but ambulatory and able to carry out work of a light or sedentary nature] : Status 1- Restricted in physically strenuous activity but ambulatory and able to carry out work of a light or sedentary nature, e.g., light house work, office work [Normal] : affect appropriate [de-identified] : well-healed laparoscopic scars on abdomen

## 2020-11-18 ENCOUNTER — APPOINTMENT (OUTPATIENT)
Dept: INTERNAL MEDICINE | Facility: CLINIC | Age: 75
End: 2020-11-18
Payer: MEDICARE

## 2020-11-18 ENCOUNTER — LABORATORY RESULT (OUTPATIENT)
Age: 75
End: 2020-11-18

## 2020-11-18 VITALS
BODY MASS INDEX: 24.66 KG/M2 | HEART RATE: 108 BPM | TEMPERATURE: 98.1 F | SYSTOLIC BLOOD PRESSURE: 160 MMHG | WEIGHT: 148 LBS | HEIGHT: 65 IN | OXYGEN SATURATION: 98 % | DIASTOLIC BLOOD PRESSURE: 80 MMHG

## 2020-11-18 PROCEDURE — 99214 OFFICE O/P EST MOD 30 MIN: CPT | Mod: 25

## 2020-11-18 PROCEDURE — 36415 COLL VENOUS BLD VENIPUNCTURE: CPT

## 2020-11-18 RX ORDER — AMLODIPINE BESYLATE 2.5 MG/1
2.5 TABLET ORAL
Qty: 90 | Refills: 2 | Status: DISCONTINUED | COMMUNITY
Start: 2020-08-12 | End: 2020-11-18

## 2020-11-18 NOTE — ASSESSMENT
[FreeTextEntry1] : submucosal gastric mass GIST s/p robotic laparoscopic partial gastrectomy 7/10/2020 \par -followed by Surgeon and Oncologist \par TNM stage: T3, Nx, M0 \par AJCC Stage: Ib \par -next GIST surveillance scan in January 2021 then q6 months f/u surgeon as instructed\par \par Hypertension- 160/80 \par - on enalapril 20 po bid ,amlodipine 10 mg daily\par -HCTZ was discontinued last 3/2018 \par - cardio consult reviewed - stress test negative Controlled, continue current medications, low sodium-DASH diet.\par -f/u check bp next visit \par \par Diabetes type 2 , on insulin and oral hypoglycemic - stable \par -AIC 5.7 7/2/2020\par - diet low in carbs, , continue current medications. Monitor Accu-Cheks daily, ophthalmology- due will schedule apt , Monitor for signs of hypoglycemia. \par d/w patient to eat a low carbohydrate diet Continue 1800 kcal ADA diet, avoid rice, pasta, sugar, sweet, soda, juices,start exercise daily 30 minutes \par \par h/o hep c s/p interferon rx , RNA negative - AFP neg \par \par Hyperlipidemia\par -advised low carb diet , avoid fried foods, continue current medications,\par Low-fat diet, avoid red meat, cheese, butter, peanuts and exercise daily for 40 minutes.\par \par HCm \par [facility] 10/[facility] at [facility] \par Shingrex vaccine adviced to check with insurance

## 2020-11-18 NOTE — HISTORY OF PRESENT ILLNESS
[de-identified] : This is a 74 y/o female c/o acid reflux, sonogram revealed + gastric mass. On 7/10/2020 she underwent a robotic assisted partial gastrectomy\par \par anemia- new onset - improving after sx  - saw gastro did colonoscope -3/2/2020 and EGD - found to have revealed a large submucosal mass 6/1- FNA was consistent with a GIST- s/p robotic laparoscopic possible open partial gastrectomy 7/10/2020\par -Disease: GIST  \par TNM stage: T3, Nx, M0 \par AJCC Stage: Ib \par -next GIST surveillance scan in January 2021 then q6 months f/u surgeon as instructed\par \par DM- doing better \par -AIc 5.7 7/2/2020 \par - - she was non comp with diet , no hypoglycemic episode , Ophth visit was 10/1/19 , no retinopathy.On Lantus 22 units with Po medications. compliant with diet and medications.\par - eliminated carbs in diet \par -walking in house , wants to but needs  to drive her to park where she can walk , she likes going out to park feels low mood walking in her neighborhood , has been eating a lot staying home \par \par Hyperlipidemia- on atorvastatin denies myalgias.\par \par Hypertension- on current medications enalapril 20 BID. off HCTZ 3/2018 to check BP today \par \par H/o hypercalcemia- monitoring calcium intake , stopped Po ca supplements last ca was normal \par - off hCTZ

## 2020-11-19 LAB
BASOPHILS # BLD AUTO: 0.04 K/UL
BASOPHILS NFR BLD AUTO: 0.7 %
CREAT SPEC-SCNC: 70 MG/DL
EOSINOPHIL # BLD AUTO: 0.11 K/UL
EOSINOPHIL NFR BLD AUTO: 2 %
ESTIMATED AVERAGE GLUCOSE: 111 MG/DL
FERRITIN SERPL-MCNC: 10 NG/ML
HBA1C MFR BLD HPLC: 5.5 %
HCT VFR BLD CALC: 45.1 %
HGB BLD-MCNC: 13.8 G/DL
IMM GRANULOCYTES NFR BLD AUTO: 0.2 %
LYMPHOCYTES # BLD AUTO: 2.66 K/UL
LYMPHOCYTES NFR BLD AUTO: 47.6 %
MAN DIFF?: NORMAL
MCHC RBC-ENTMCNC: 24.1 PG
MCHC RBC-ENTMCNC: 30.6 GM/DL
MCV RBC AUTO: 78.7 FL
MICROALBUMIN 24H UR DL<=1MG/L-MCNC: 120.1 MG/DL
MICROALBUMIN/CREAT 24H UR-RTO: 1725 MG/G
MONOCYTES # BLD AUTO: 0.62 K/UL
MONOCYTES NFR BLD AUTO: 11.1 %
NEUTROPHILS # BLD AUTO: 2.15 K/UL
NEUTROPHILS NFR BLD AUTO: 38.4 %
PLATELET # BLD AUTO: 329 K/UL
RBC # BLD: 5.73 M/UL
RBC # FLD: 21.6 %
TSH SERPL-ACNC: 1.58 UIU/ML
WBC # FLD AUTO: 5.59 K/UL

## 2020-11-20 LAB
ALBUMIN SERPL ELPH-MCNC: 5.3 G/DL
ALP BLD-CCNC: 57 U/L
ALT SERPL-CCNC: 15 U/L
ANION GAP SERPL CALC-SCNC: 24 MMOL/L
AST SERPL-CCNC: 19 U/L
BILIRUB SERPL-MCNC: <0.2 MG/DL
BUN SERPL-MCNC: 10 MG/DL
CALCIUM SERPL-MCNC: 11.1 MG/DL
CHLORIDE SERPL-SCNC: 102 MMOL/L
CHOLEST SERPL-MCNC: 172 MG/DL
CO2 SERPL-SCNC: 19 MMOL/L
CREAT SERPL-MCNC: 0.59 MG/DL
GLUCOSE SERPL-MCNC: 59 MG/DL
HDLC SERPL-MCNC: 36 MG/DL
LDLC SERPL CALC-MCNC: 115 MG/DL
NONHDLC SERPL-MCNC: 136 MG/DL
POTASSIUM SERPL-SCNC: 4.2 MMOL/L
PROT SERPL-MCNC: 8.8 G/DL
SODIUM SERPL-SCNC: 144 MMOL/L
TRIGL SERPL-MCNC: 106 MG/DL

## 2021-01-07 ENCOUNTER — RESULT REVIEW (OUTPATIENT)
Age: 76
End: 2021-01-07

## 2021-01-07 ENCOUNTER — APPOINTMENT (OUTPATIENT)
Dept: CT IMAGING | Facility: IMAGING CENTER | Age: 76
End: 2021-01-07
Payer: MEDICARE

## 2021-01-07 ENCOUNTER — OUTPATIENT (OUTPATIENT)
Dept: OUTPATIENT SERVICES | Facility: HOSPITAL | Age: 76
LOS: 1 days | End: 2021-01-07
Payer: MEDICARE

## 2021-01-07 DIAGNOSIS — Z98.49 CATARACT EXTRACTION STATUS, UNSPECIFIED EYE: Chronic | ICD-10-CM

## 2021-01-07 DIAGNOSIS — C49.A0 GASTROINTESTINAL STROMAL TUMOR, UNSPECIFIED SITE: ICD-10-CM

## 2021-01-07 DIAGNOSIS — Z98.890 OTHER SPECIFIED POSTPROCEDURAL STATES: Chronic | ICD-10-CM

## 2021-01-07 PROCEDURE — 74160 CT ABDOMEN W/CONTRAST: CPT

## 2021-01-07 PROCEDURE — 82565 ASSAY OF CREATININE: CPT

## 2021-01-07 PROCEDURE — 74160 CT ABDOMEN W/CONTRAST: CPT | Mod: 26

## 2021-01-20 ENCOUNTER — OUTPATIENT (OUTPATIENT)
Dept: OUTPATIENT SERVICES | Facility: HOSPITAL | Age: 76
LOS: 1 days | Discharge: HOME | End: 2021-01-20

## 2021-01-20 DIAGNOSIS — Z98.49 CATARACT EXTRACTION STATUS, UNSPECIFIED EYE: Chronic | ICD-10-CM

## 2021-01-20 DIAGNOSIS — Z98.890 OTHER SPECIFIED POSTPROCEDURAL STATES: Chronic | ICD-10-CM

## 2021-01-20 DIAGNOSIS — C49.A0 GASTROINTESTINAL STROMAL TUMOR, UNSPECIFIED SITE: ICD-10-CM

## 2021-01-25 ENCOUNTER — APPOINTMENT (OUTPATIENT)
Dept: HEMATOLOGY ONCOLOGY | Facility: CLINIC | Age: 76
End: 2021-01-25

## 2021-01-25 ENCOUNTER — TRANSCRIPTION ENCOUNTER (OUTPATIENT)
Age: 76
End: 2021-01-25

## 2021-01-27 ENCOUNTER — APPOINTMENT (OUTPATIENT)
Dept: HEMATOLOGY ONCOLOGY | Facility: CLINIC | Age: 76
End: 2021-01-27

## 2021-01-28 ENCOUNTER — APPOINTMENT (OUTPATIENT)
Dept: SURGICAL ONCOLOGY | Facility: CLINIC | Age: 76
End: 2021-01-28
Payer: MEDICARE

## 2021-01-28 VITALS
SYSTOLIC BLOOD PRESSURE: 181 MMHG | DIASTOLIC BLOOD PRESSURE: 70 MMHG | WEIGHT: 156 LBS | TEMPERATURE: 97.9 F | HEART RATE: 101 BPM | OXYGEN SATURATION: 99 % | RESPIRATION RATE: 18 BRPM | HEIGHT: 65 IN | BODY MASS INDEX: 25.99 KG/M2

## 2021-01-28 PROCEDURE — 99214 OFFICE O/P EST MOD 30 MIN: CPT

## 2021-01-29 ENCOUNTER — APPOINTMENT (OUTPATIENT)
Dept: HEMATOLOGY ONCOLOGY | Facility: CLINIC | Age: 76
End: 2021-01-29

## 2021-02-01 ENCOUNTER — APPOINTMENT (OUTPATIENT)
Dept: HEMATOLOGY ONCOLOGY | Facility: CLINIC | Age: 76
End: 2021-02-01
Payer: MEDICARE

## 2021-02-01 ENCOUNTER — NON-APPOINTMENT (OUTPATIENT)
Age: 76
End: 2021-02-01

## 2021-02-02 NOTE — ASSESSMENT
[FreeTextEntry1] : John will follow up in 6 months.  We discussed the need for ongoing endoscopic surveillance.  I am also arranging repeat imaging in 6 months.

## 2021-02-02 NOTE — HISTORY OF PRESENT ILLNESS
[de-identified] : Jocelynn is a pleasant 75 year-old female who returns for follow up, status post robotic assisted partial gastrectomy on 7/10/20.  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  Some fat necrosis was expressed from the incision during her initial postop visit on 7/23/20.\par \par She consulted with Dr. Obie Santos from medical oncology on 8/24/20.  Given  intermediate recurrence risk based on NIH Saravia criteria, adjuvant therapy with imatinib was not recommended and she will be kept on surveillance.\par \par CT of the abdomen and pelvis in September 2020 showed no evidence of recurrent or metastatic disease.  Most recent CT of the abdomen and pelvis in January 2021 was negative as well.  Dr. Santos will arrange for CT scans every 6 months, the next of which will be done in June 2021.  She currently has not made any appointments for a follow up surveillance endoscopy.\par \par Dr. Santos is currently treating Jocelynn for iron deficiency anemia. She is feeling well and offers no complaints but states that she is claustrophobic in the exam room which is causing her blood pressure to be elevated.  She is eating well and denies nausea/vomiting, moving her bowels without difficulty and denies abdominal pain, fever, chills or weight loss. \par \par Previous pertinent history is as follows:\par \par She was initially seen in consultation on 6/15/20.  She was referred to Dr. Ike Pabon in March 2020 for evaluation of anemia, GERD and early satiety.  Work up included an EGD which revealed a large submucosal mass without bleeding in the upper stomach.  Biopsy was benign.  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  A colonoscopy was also performed, negative for malignancy or polyps. \par \par CT of the chest, abdomen and pelvis performed on 3/3/20 revealed a 6 cm cystic thick-walled mass which may be arising from the pancreatic tail and invaginating the lesser curvature of the stomach.  Subsequent MRI/MRCP on 3/22/20 revealed a 7.3 cm submucosal lesion the proximal stomach with a large central cystic component, favoring a GIST.  Doubt pancreatic lesion.  No metastatic disease.\par \par On 6/1/20 Dr. Wang Wynne performed an EUS which revealed a 7 cm subepithelial gastric lesion (location unspecified). 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  She was referred to myself as well as Dr. Obie Santos from medical oncology for further management. \par \par Her past medical history includes hypertension, hyperlipidemia, type 2 diabetes, chronic hepatitis C, GERD and cataracts.

## 2021-02-02 NOTE — PHYSICAL EXAM
[Normal] : supple, no neck mass and thyroid not enlarged [Normal Neck Lymph Nodes] : normal neck lymph nodes  [Normal Supraclavicular Lymph Nodes] : normal supraclavicular lymph nodes [Normal] : oriented to person, place and time, with appropriate affect [de-identified] : abdominal incisions healed with no mass or hernia

## 2021-02-02 NOTE — CONSULT LETTER
[FreeTextEntry2] : Ike Pabon MD [FreeTextEntry3] : Aj Aiken MD\par Surgical Oncology\par [facility]\par Office: [phone]\par Cell: [phone]\par

## 2021-02-03 ENCOUNTER — RX RENEWAL (OUTPATIENT)
Age: 76
End: 2021-02-03

## 2021-02-05 ENCOUNTER — APPOINTMENT (OUTPATIENT)
Dept: HEMATOLOGY ONCOLOGY | Facility: CLINIC | Age: 76
End: 2021-02-05
Payer: MEDICARE

## 2021-02-05 PROCEDURE — 99213 OFFICE O/P EST LOW 20 MIN: CPT | Mod: 95

## 2021-02-08 ENCOUNTER — RX RENEWAL (OUTPATIENT)
Age: 76
End: 2021-02-08

## 2021-02-08 NOTE — HISTORY OF PRESENT ILLNESS
[Home] : at home, [unfilled] , at the time of the visit. [Medical Office: ([facility])___] : at the medical office located in  [Family Member] : family member [Verbal consent obtained from patient] : the patient, [unfilled] [de-identified] : 72 y/o F with PMH of hypertension, hyperlipidemia, type 2 diabetes, chronic hepatitis C, GERD and cataracts who in March 2020, noted to have issues with anemia, GERD and early satiety and work up included an EGD (a large submucosal mass without bleeding in the upper stomach. 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  A CT of the chest, abdomen and pelvis performed on 3/3/20 revealed a 6 cm cystic thick-walled mass which may be arising from the pancreatic tail and invaginating the lesser curvature of the stomach. Subsequent MRI/MRCP on 3/22/20 revealed a 7.3 cm submucosal lesion the proximal stomach with a large central cystic component, favoring a GIST. Doubt pancreatic lesion. No metastatic disease.  On 6/1/20 Dr. Wang Wynne performed an EUS which revealed a 7 cm subepithelial gastric lesion (location unspecified). 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  Patient presents to us to discuss oncologic care on 8/24/2020 and placed on surveillance \par \par PCP: Dr. Сергей Bui ([phone], [address]) \par \par  Patient has Stage IB GIST with intermediate recurrence risk based on NIH-Saravia     criteria (size 5-10cm and mitotic rate <5). [facility] nomogram shows 94%     recurrence-free survival at 2 years and 89% recurrence-free survival at 5 years. 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  She feels well overall and denies any complaints.  She continues on Iron supplements.  \par \par \par \par \par \par

## 2021-02-08 NOTE — PHYSICAL EXAM
[de-identified] : anicteric  [de-identified] : neck supple  [de-identified] : breathing comfortably  [de-identified] : well-healed laparoscopic scars on abdomen

## 2021-02-08 NOTE — REVIEW OF SYSTEMS
[Fever] : no fever [Chills] : no chills [Fatigue] : no fatigue [Recent Change In Weight] : ~T no recent weight change [Eye Pain] : no eye pain [Vision Problems] : no vision problems [Dysphagia] : no dysphagia [Odynophagia] : no odynophagia [Chest Pain] : no chest pain [Palpitations] : no palpitations [Shortness Of Breath] : no shortness of breath [Cough] : no cough [Abdominal Pain] : no abdominal pain [Vomiting] : no vomiting [Diarrhea] : no diarrhea [Joint Pain] : no joint pain [Muscle Pain] : no muscle pain [Confused] : no confusion [Dizziness] : no dizziness

## 2021-02-08 NOTE — RESULTS/DATA
[FreeTextEntry1] : EXAM: CT ABDOMEN ONLY OC IC   PROCEDURE DATE: 01/07/2021    INTERPRETATION: CLINICAL INFORMATION: Resected gastrointestinal stromal tumor. Evaluate for recurrence.  COMPARISON: CT 9/9/2020.  PROCEDURE: CT of the Abdomen was performed with intravenous contrast. Arterial and Portal Venous phases were acquired. Intravenous contrast: 90 ml Omnipaque 350. 10 ml discarded. Oral contrast: None. Sagittal and coronal reformats were performed.  FINDINGS: LOWER CHEST: Within normal limits.  LIVER: Hepatic cysts, unchanged. BILE DUCTS: Normal caliber. GALLBLADDER: Within normal limits. SPLEEN: Within normal limits. PANCREAS: Within normal limits. ADRENALS: Within normal limits. KIDNEYS/URETERS: Bilateral renal cysts.  VISUALIZED PORTIONS: BOWEL: Partial gastrectomy. No evidence of locally recurrent disease. PERITONEUM: No ascites. VESSELS: Atherosclerotic changes. RETROPERITONEUM/LYMPH NODES: No lymphadenopathy. ABDOMINAL WALL: Within normal limits. BONES: Degenerative changes.  IMPRESSION: No evidence of recurrent or metastatic disease in the abdomen.        ANAID REBOLLEDO MD; Attending Radiologist This document has been electronically signed. Jan 7 2021 4:35PM

## 2021-02-11 ENCOUNTER — NON-APPOINTMENT (OUTPATIENT)
Age: 76
End: 2021-02-11

## 2021-02-11 ENCOUNTER — RESULT REVIEW (OUTPATIENT)
Age: 76
End: 2021-02-11

## 2021-02-11 ENCOUNTER — APPOINTMENT (OUTPATIENT)
Dept: HEMATOLOGY ONCOLOGY | Facility: CLINIC | Age: 76
End: 2021-02-11

## 2021-02-11 LAB
BASOPHILS # BLD AUTO: 0.02 K/UL — SIGNIFICANT CHANGE UP (ref 0–0.2)
BASOPHILS NFR BLD AUTO: 0.4 % — SIGNIFICANT CHANGE UP (ref 0–2)
EOSINOPHIL # BLD AUTO: 0.08 K/UL — SIGNIFICANT CHANGE UP (ref 0–0.5)
EOSINOPHIL NFR BLD AUTO: 1.6 % — SIGNIFICANT CHANGE UP (ref 0–6)
FERRITIN SERPL-MCNC: 26 NG/ML
HCT VFR BLD CALC: 43.1 % — SIGNIFICANT CHANGE UP (ref 34.5–45)
HGB BLD-MCNC: 14.4 G/DL — SIGNIFICANT CHANGE UP (ref 11.5–15.5)
IMM GRANULOCYTES NFR BLD AUTO: 0.6 % — SIGNIFICANT CHANGE UP (ref 0–1.5)
IRON SATN MFR SERPL: 54 %
IRON SERPL-MCNC: 158 UG/DL
LYMPHOCYTES # BLD AUTO: 1.45 K/UL — SIGNIFICANT CHANGE UP (ref 1–3.3)
LYMPHOCYTES # BLD AUTO: 28.4 % — SIGNIFICANT CHANGE UP (ref 13–44)
MCHC RBC-ENTMCNC: 27.5 PG — SIGNIFICANT CHANGE UP (ref 27–34)
MCHC RBC-ENTMCNC: 33.4 G/DL — SIGNIFICANT CHANGE UP (ref 32–36)
MCV RBC AUTO: 82.4 FL — SIGNIFICANT CHANGE UP (ref 80–100)
MONOCYTES # BLD AUTO: 0.43 K/UL — SIGNIFICANT CHANGE UP (ref 0–0.9)
MONOCYTES NFR BLD AUTO: 8.4 % — SIGNIFICANT CHANGE UP (ref 2–14)
NEUTROPHILS # BLD AUTO: 3.1 K/UL — SIGNIFICANT CHANGE UP (ref 1.8–7.4)
NEUTROPHILS NFR BLD AUTO: 60.6 % — SIGNIFICANT CHANGE UP (ref 43–77)
NRBC # BLD: 0 /100 WBCS — SIGNIFICANT CHANGE UP (ref 0–0)
PLATELET # BLD AUTO: 272 K/UL — SIGNIFICANT CHANGE UP (ref 150–400)
RBC # BLD: 5.23 M/UL — HIGH (ref 3.8–5.2)
RBC # FLD: 15.6 % — HIGH (ref 10.3–14.5)
RETICS #: 94.1 K/UL — SIGNIFICANT CHANGE UP (ref 25–125)
RETICS/RBC NFR: 1.8 % — SIGNIFICANT CHANGE UP (ref 0.5–2.5)
TIBC SERPL-MCNC: 292 UG/DL
UIBC SERPL-MCNC: 134 UG/DL
WBC # BLD: 5.11 K/UL — SIGNIFICANT CHANGE UP (ref 3.8–10.5)
WBC # FLD AUTO: 5.11 K/UL — SIGNIFICANT CHANGE UP (ref 3.8–10.5)

## 2021-02-12 ENCOUNTER — RX RENEWAL (OUTPATIENT)
Age: 76
End: 2021-02-12

## 2021-03-16 ENCOUNTER — NON-APPOINTMENT (OUTPATIENT)
Age: 76
End: 2021-03-16

## 2021-03-25 ENCOUNTER — OUTPATIENT (OUTPATIENT)
Dept: OUTPATIENT SERVICES | Facility: HOSPITAL | Age: 76
LOS: 1 days | End: 2021-03-25
Payer: MEDICARE

## 2021-03-25 ENCOUNTER — RESULT REVIEW (OUTPATIENT)
Age: 76
End: 2021-03-25

## 2021-03-25 ENCOUNTER — APPOINTMENT (OUTPATIENT)
Dept: MAMMOGRAPHY | Facility: IMAGING CENTER | Age: 76
End: 2021-03-25
Payer: MEDICARE

## 2021-03-25 ENCOUNTER — TRANSCRIPTION ENCOUNTER (OUTPATIENT)
Age: 76
End: 2021-03-25

## 2021-03-25 DIAGNOSIS — Z00.00 ENCOUNTER FOR GENERAL ADULT MEDICAL EXAMINATION WITHOUT ABNORMAL FINDINGS: ICD-10-CM

## 2021-03-25 DIAGNOSIS — Z98.49 CATARACT EXTRACTION STATUS, UNSPECIFIED EYE: Chronic | ICD-10-CM

## 2021-03-25 DIAGNOSIS — Z98.890 OTHER SPECIFIED POSTPROCEDURAL STATES: Chronic | ICD-10-CM

## 2021-03-25 PROCEDURE — 77067 SCR MAMMO BI INCL CAD: CPT

## 2021-03-25 PROCEDURE — 77063 BREAST TOMOSYNTHESIS BI: CPT

## 2021-03-25 PROCEDURE — 77063 BREAST TOMOSYNTHESIS BI: CPT | Mod: 26

## 2021-03-25 PROCEDURE — 77067 SCR MAMMO BI INCL CAD: CPT | Mod: 26

## 2021-03-29 ENCOUNTER — TRANSCRIPTION ENCOUNTER (OUTPATIENT)
Age: 76
End: 2021-03-29

## 2021-04-02 ENCOUNTER — APPOINTMENT (OUTPATIENT)
Dept: INTERNAL MEDICINE | Facility: CLINIC | Age: 76
End: 2021-04-02

## 2021-04-07 ENCOUNTER — LABORATORY RESULT (OUTPATIENT)
Age: 76
End: 2021-04-07

## 2021-04-07 ENCOUNTER — APPOINTMENT (OUTPATIENT)
Dept: INTERNAL MEDICINE | Facility: CLINIC | Age: 76
End: 2021-04-07
Payer: MEDICARE

## 2021-04-07 VITALS
WEIGHT: 160 LBS | SYSTOLIC BLOOD PRESSURE: 162 MMHG | DIASTOLIC BLOOD PRESSURE: 60 MMHG | TEMPERATURE: 98.5 F | HEART RATE: 108 BPM | BODY MASS INDEX: 26.66 KG/M2 | OXYGEN SATURATION: 98 % | HEIGHT: 65 IN

## 2021-04-07 VITALS — SYSTOLIC BLOOD PRESSURE: 168 MMHG | DIASTOLIC BLOOD PRESSURE: 74 MMHG

## 2021-04-07 DIAGNOSIS — K31.89 OTHER DISEASES OF STOMACH AND DUODENUM: ICD-10-CM

## 2021-04-07 DIAGNOSIS — R19.4 CHANGE IN BOWEL HABIT: ICD-10-CM

## 2021-04-07 DIAGNOSIS — K21.9 GASTRO-ESOPHAGEAL REFLUX DISEASE W/OUT ESOPHAGITIS: ICD-10-CM

## 2021-04-07 PROCEDURE — G0439: CPT

## 2021-04-07 PROCEDURE — G0444 DEPRESSION SCREEN ANNUAL: CPT | Mod: 59

## 2021-04-07 RX ORDER — BLOOD PRESSURE TEST KIT-LARGE
KIT MISCELLANEOUS
Qty: 1 | Refills: 0 | Status: ACTIVE | COMMUNITY
Start: 2021-04-07 | End: 1900-01-01

## 2021-04-07 NOTE — ASSESSMENT
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 \par -followed by Surgeon and Oncologist \par TNM stage: T3, Nx, M0 \par AJCC Stage: Ib \par - surveillance scan q6 months, in July 2021\par - follows with oncology, no chemo/radiation advised at this time\par - anemia improving since surgery, continues iron supplements\par - denies abdominal pain, heartburn, diarrhea, constipation\par \par Iron deficiency anemia:\par -Improving since GI surgery, continues iron supplements\par -Labs today \par \par Hypertension:\par - elevated today, slightly lower on repeat, 150/72\par - on enalapril 20 po bid, amlodipine 10 mg daily\par - HCTZ was discontinued last 3/2018 d/t hypercalcemia\par - cardio consult reviewed - stress test negative\par - does not check BP at home, would like to, Rx for cuff sent into pharmacy.  \par - encouraged to increased physical activity, plans to start walking with the nicer weather\par \par Proteinuria:\par -good DM control, poor BP control recently\par -recheck urine today\par -BP cuff ordered to do home checks\par \par Diabetes type 2\par - on insulin and oral hypoglycemic - stable \par - AIC 5.7 7/2/2020\par - diet low in carbs, continue current medications. Monitor Accu-Cheks daily, \par - ophthalmology every 3 months \par - encouraged to incerase physical activity \par \par h/o hep c s/p interferon rx , RNA negative - AFP neg \par \par Hyperlipidemia\par -continues statin, working on diet and exercise \par \par HM\par UTD mammo\par UTD pap\par UTD colo\par UTD dental\par referral for skin screening\par due for repeat bone density\par UTD tdap\par UTD pneumococcal vaccines\par Needs shingrix, medicare does not cover in the office advised local pharmacy to administer\par Scheduled for COVID vaccine\par ACP discussed, information provided, encouraged to review with spouse\par

## 2021-04-07 NOTE — HISTORY OF PRESENT ILLNESS
[de-identified] : Feeling well.  \par \par Anemia - new onset.  Saw gastro did colonoscope -3/2/2020 and EGD - found to have large submucosal mass.   6/1- FNA was consistent with a GIST- s/p robotic laparoscopic possible open partial gastrectomy 7/10/2020\par -Disease: GIST  \par TNM stage: T3, Nx, M0 \par AJCC Stage: Ib \par - surveillance scan q6 months, in July 2021\par - follows with oncology, no chemo/radiation advised at this time\par - anemia improving since surgery, continues iron supplements\par - denies abdominal pain, heartburn, diarrhea, constipation\par \par \par DM - well controlled \par - AIc 5.5 11/18/2020 \par - Compliant with diet and medications.\par - trying to start walking more\par - feels like since taking the iron has been more hungry\par - 135-160s fasting BG, rare FBG 60-90.  \par \par Hyperlipidemia- on atorvastatin denies myalgias.\par \par Hypertension- compliant with meds (enalapril, amlodipine), off HCTZ 3/2018 d/t hypercalcemia \par \par H/o hypercalcemia- monitoring calcium intake, stopped Po ca supplements last ca was normal \par - off HCTZ

## 2021-04-07 NOTE — HEALTH RISK ASSESSMENT
[Patient reported mammogram was normal] : Patient reported mammogram was normal [Patient reported bone density results were normal] : Patient reported bone density results were normal [Good] : ~his/her~  mood as  good [No] : No [No falls in past year] : Patient reported no falls in the past year [0] : 2) Feeling down, depressed, or hopeless: Not at all (0) [No Retinopathy] : No retinopathy [Patient reported PAP Smear was normal] : Patient reported PAP Smear was normal [With Family] : lives with family [Retired] : retired [] :  [Fully functional (bathing, dressing, toileting, transferring, walking, feeding)] : Fully functional (bathing, dressing, toileting, transferring, walking, feeding) [Fully functional (using the telephone, shopping, preparing meals, housekeeping, doing laundry, using] : Fully functional and needs no help or supervision to perform IADLs (using the telephone, shopping, preparing meals, housekeeping, doing laundry, using transportation, managing medications and managing finances) [Smoke Detector] : smoke detector [Carbon Monoxide Detector] : carbon monoxide detector [With Patient/Caregiver] : With Patient/Caregiver [] : No [de-identified] : trying to do some walking  [DAW1Borpp] : 0 [EyeExamDate] : 03/21 [Change in mental status noted] : No change in mental status noted [Reports changes in hearing] : Reports no changes in hearing [Reports changes in vision] : Reports no changes in vision [Reports changes in dental health] : Reports no changes in dental health [MammogramDate] : 03/21 [PapSmearDate] : 01/18 [BoneDensityDate] : 04/18 [ColonoscopyDate] : 03/21 [AdvancecareDate] : 04/21

## 2021-04-08 LAB
25(OH)D3 SERPL-MCNC: 32.2 NG/ML
AFP-TM SERPL-MCNC: 3.1 NG/ML
ALBUMIN SERPL ELPH-MCNC: 5 G/DL
ALP BLD-CCNC: 63 U/L
ALT SERPL-CCNC: 28 U/L
ANION GAP SERPL CALC-SCNC: 17 MMOL/L
APPEARANCE: CLEAR
AST SERPL-CCNC: 23 U/L
BASOPHILS # BLD AUTO: 0.02 K/UL
BASOPHILS NFR BLD AUTO: 0.4 %
BILIRUB SERPL-MCNC: 0.3 MG/DL
BILIRUBIN URINE: NEGATIVE
BLOOD URINE: NEGATIVE
BUN SERPL-MCNC: 10 MG/DL
CALCIUM SERPL-MCNC: 10.3 MG/DL
CHLORIDE SERPL-SCNC: 103 MMOL/L
CHOLEST SERPL-MCNC: 163 MG/DL
CO2 SERPL-SCNC: 23 MMOL/L
COLOR: NORMAL
CREAT SERPL-MCNC: 0.58 MG/DL
CREAT SPEC-SCNC: 58 MG/DL
EOSINOPHIL # BLD AUTO: 0.12 K/UL
EOSINOPHIL NFR BLD AUTO: 2.5 %
ESTIMATED AVERAGE GLUCOSE: 123 MG/DL
FERRITIN SERPL-MCNC: 21 NG/ML
FOLATE SERPL-MCNC: >20 NG/ML
GLUCOSE QUALITATIVE U: NEGATIVE
GLUCOSE SERPL-MCNC: 165 MG/DL
HBA1C MFR BLD HPLC: 5.9 %
HCT VFR BLD CALC: 43.2 %
HDLC SERPL-MCNC: 32 MG/DL
HGB BLD-MCNC: 14.1 G/DL
IMM GRANULOCYTES NFR BLD AUTO: 0.6 %
KETONES URINE: NEGATIVE
LDLC SERPL CALC-MCNC: 107 MG/DL
LEUKOCYTE ESTERASE URINE: NEGATIVE
LYMPHOCYTES # BLD AUTO: 1.67 K/UL
LYMPHOCYTES NFR BLD AUTO: 34.4 %
MAN DIFF?: NORMAL
MCHC RBC-ENTMCNC: 27.6 PG
MCHC RBC-ENTMCNC: 32.6 GM/DL
MCV RBC AUTO: 84.7 FL
MICROALBUMIN 24H UR DL<=1MG/L-MCNC: 47.2 MG/DL
MICROALBUMIN/CREAT 24H UR-RTO: 819 MG/G
MONOCYTES # BLD AUTO: 0.54 K/UL
MONOCYTES NFR BLD AUTO: 11.1 %
NEUTROPHILS # BLD AUTO: 2.48 K/UL
NEUTROPHILS NFR BLD AUTO: 51 %
NITRITE URINE: NEGATIVE
NONHDLC SERPL-MCNC: 131 MG/DL
PH URINE: 7
PLATELET # BLD AUTO: 305 K/UL
POTASSIUM SERPL-SCNC: 4.2 MMOL/L
PROT SERPL-MCNC: 8.4 G/DL
PROTEIN URINE: ABNORMAL
RBC # BLD: 5.1 M/UL
RBC # FLD: 13.5 %
SODIUM SERPL-SCNC: 142 MMOL/L
SPECIFIC GRAVITY URINE: 1.01
TRIGL SERPL-MCNC: 123 MG/DL
TSH SERPL-ACNC: 1.81 UIU/ML
UROBILINOGEN URINE: NORMAL
VIT B12 SERPL-MCNC: >2000 PG/ML
WBC # FLD AUTO: 4.86 K/UL

## 2021-04-14 LAB
ALBUMIN MFR SERPL ELPH: 56.8 %
ALBUMIN SERPL-MCNC: 4.8 G/DL
ALBUMIN/GLOB SERPL: 1.3 RATIO
ALBUPE: 56.5 %
ALPHA1 GLOB MFR SERPL ELPH: 3.1 %
ALPHA1 GLOB SERPL ELPH-MCNC: 0.3 G/DL
ALPHA1UPE: 15.4 %
ALPHA2 GLOB MFR SERPL ELPH: 10.2 %
ALPHA2 GLOB SERPL ELPH-MCNC: 0.9 G/DL
ALPHA2UPE: 10.3 %
B-GLOBULIN MFR SERPL ELPH: 9.6 %
B-GLOBULIN SERPL ELPH-MCNC: 0.8 G/DL
BETAUPE: 8.6 %
CREAT 24H UR-MCNC: NORMAL G/24 H
CREATININE UR (MAYO): 35 MG/DL
DEPRECATED KAPPA LC FREE/LAMBDA SER: 1.15 RATIO
DEPRECATED KAPPA LC FREE/LAMBDA SER: 1.15 RATIO
GAMMA GLOB FLD ELPH-MCNC: 1.7 G/DL
GAMMA GLOB MFR SERPL ELPH: 20.3 %
GAMMAUPE: 9.2 %
IGA 24H UR QL IFE: NORMAL
IGA SER QL IEP: 170 MG/DL
IGG SER QL IEP: 1674 MG/DL
IGM SER QL IEP: 45 MG/DL
INTERPRETATION SERPL IEP-IMP: NORMAL
KAPPA LC 24H UR QL: NORMAL
KAPPA LC CSF-MCNC: 1.61 MG/DL
KAPPA LC CSF-MCNC: 1.61 MG/DL
KAPPA LC SERPL-MCNC: 1.85 MG/DL
KAPPA LC SERPL-MCNC: 1.85 MG/DL
M PROTEIN SPEC IFE-MCNC: NORMAL
PROT PATTERN 24H UR ELPH-IMP: NORMAL
PROT SERPL-MCNC: 8.4 G/DL
PROT UR-MCNC: 12 MG/DL
PROT UR-MCNC: 12 MG/DL
SPECIMEN VOL 24H UR: NORMAL ML

## 2021-04-22 ENCOUNTER — RX RENEWAL (OUTPATIENT)
Age: 76
End: 2021-04-22

## 2021-04-26 ENCOUNTER — RX RENEWAL (OUTPATIENT)
Age: 76
End: 2021-04-26

## 2021-04-29 ENCOUNTER — OUTPATIENT (OUTPATIENT)
Dept: OUTPATIENT SERVICES | Facility: HOSPITAL | Age: 76
LOS: 1 days | End: 2021-04-29
Payer: MEDICARE

## 2021-04-29 ENCOUNTER — APPOINTMENT (OUTPATIENT)
Dept: RADIOLOGY | Facility: IMAGING CENTER | Age: 76
End: 2021-04-29
Payer: MEDICARE

## 2021-04-29 DIAGNOSIS — Z98.49 CATARACT EXTRACTION STATUS, UNSPECIFIED EYE: Chronic | ICD-10-CM

## 2021-04-29 DIAGNOSIS — Z00.00 ENCOUNTER FOR GENERAL ADULT MEDICAL EXAMINATION WITHOUT ABNORMAL FINDINGS: ICD-10-CM

## 2021-04-29 DIAGNOSIS — Z98.890 OTHER SPECIFIED POSTPROCEDURAL STATES: Chronic | ICD-10-CM

## 2021-04-29 PROCEDURE — 77080 DXA BONE DENSITY AXIAL: CPT

## 2021-04-29 PROCEDURE — 77080 DXA BONE DENSITY AXIAL: CPT | Mod: 26

## 2021-05-06 ENCOUNTER — APPOINTMENT (OUTPATIENT)
Dept: DERMATOLOGY | Facility: CLINIC | Age: 76
End: 2021-05-06
Payer: MEDICARE

## 2021-05-06 DIAGNOSIS — L65.9 NONSCARRING HAIR LOSS, UNSPECIFIED: ICD-10-CM

## 2021-05-06 PROCEDURE — 99213 OFFICE O/P EST LOW 20 MIN: CPT | Mod: GC

## 2021-05-13 ENCOUNTER — RX RENEWAL (OUTPATIENT)
Age: 76
End: 2021-05-13

## 2021-05-13 ENCOUNTER — APPOINTMENT (OUTPATIENT)
Dept: GASTROENTEROLOGY | Facility: CLINIC | Age: 76
End: 2021-05-13
Payer: MEDICARE

## 2021-05-13 VITALS
HEART RATE: 124 BPM | DIASTOLIC BLOOD PRESSURE: 87 MMHG | WEIGHT: 158 LBS | TEMPERATURE: 95.5 F | SYSTOLIC BLOOD PRESSURE: 196 MMHG | HEIGHT: 65 IN | BODY MASS INDEX: 26.33 KG/M2

## 2021-05-13 DIAGNOSIS — E66.3 OVERWEIGHT: ICD-10-CM

## 2021-05-13 PROCEDURE — 99214 OFFICE O/P EST MOD 30 MIN: CPT

## 2021-05-13 PROCEDURE — 82274 ASSAY TEST FOR BLOOD FECAL: CPT | Mod: QW

## 2021-05-13 RX ORDER — FLUOCINONIDE 0.5 MG/ML
0.05 SOLUTION TOPICAL
Qty: 1 | Refills: 3 | Status: DISCONTINUED | COMMUNITY
Start: 2018-11-06 | End: 2021-05-13

## 2021-05-13 RX ORDER — OFLOXACIN 3 MG/ML
0.3 SOLUTION/ DROPS OPHTHALMIC
Qty: 5 | Refills: 0 | Status: DISCONTINUED | COMMUNITY
Start: 2020-01-20 | End: 2021-05-13

## 2021-05-13 RX ORDER — CLOTRIMAZOLE 10 MG/G
1 CREAM TOPICAL 3 TIMES DAILY
Qty: 1 | Refills: 1 | Status: DISCONTINUED | COMMUNITY
Start: 2018-09-17 | End: 2021-05-13

## 2021-05-13 RX ORDER — METFORMIN HYDROCHLORIDE 1000 MG/1
1000 TABLET, COATED ORAL
Qty: 180 | Refills: 1 | Status: DISCONTINUED | COMMUNITY
Start: 2021-02-08 | End: 2021-05-13

## 2021-05-13 RX ORDER — PREDNISOLONE ACETATE 10 MG/ML
1 SUSPENSION/ DROPS OPHTHALMIC
Qty: 20 | Refills: 0 | Status: DISCONTINUED | COMMUNITY
Start: 2019-10-10 | End: 2021-05-13

## 2021-05-13 RX ORDER — BETAMETHASONE VALERATE 1 MG/G
0.1 CREAM TOPICAL TWICE DAILY
Qty: 2 | Refills: 0 | Status: DISCONTINUED | COMMUNITY
Start: 2018-09-17 | End: 2021-05-13

## 2021-05-13 NOTE — HISTORY OF PRESENT ILLNESS
[FreeTextEntry1] : Jocelynn underwent partial gastrectomy for stage IB GIST last July (Dr. Aiken), and is followed by Dr. Obie Santos.  Last CT scan January 2021 was negative for recurrent tumor.  At this time, chemotherapy is not contemplated, but she is advised to undergo surveillance endoscopy.  She denies GI symptoms at this time.  Last Hgb 14.1/Hct 43.2, Ferritin 21.  Repeat colonoscopy March 2020 revealed tortuous colon with diverticulosis and hemorrhoids, biopsies negative for microscopic colitis.

## 2021-05-13 NOTE — CONSULT LETTER
[Dear  ___] : Dear  [unfilled], [Courtesy Letter:] : I had the pleasure of seeing your patient, [unfilled], in my office today. [Please see my note below.] : Please see my note below. [Consult Closing:] : Thank you very much for allowing me to participate in the care of this patient.  If you have any questions, please do not hesitate to contact me. [Sincerely,] : Sincerely, [DrHarry  ___] : Dr. LOZANO [FreeTextEntry3] : Ike Pabon M.D.\par

## 2021-05-13 NOTE — ASSESSMENT
[FreeTextEntry1] : 1. Status post partial gastrectomy for stage IB GIST July 2020--rule out local recurrence.\par 2.  History of colonic polyp, with diverticulosis, tortuous colon, and hemorrhoids at repeat colonoscopy March 2020.\par 3.  History of iron deficiency anemia.\par 4.  Overweight.\par 5.  Type 2 diabetes mellitus.\par 6.  Hypertension, suboptimally controlled.\par 7.  Hyperlipidemia.\par 8.  History of hepatitis C, eradicated.\par 9.  Status post left leg lengthening surgery.\par 10.  Status post iridectomy.\par \par Plan:\par 1.  Interim medical records reviewed.\par 2.  Schedule surveillance EGD--she is aware of the need to hold diabetes medicine on the morning of the procedure. Procedure, rationale, and anesthesia plan were again reviewed and brochure given.\par 3.  Optimize BP control.

## 2021-05-13 NOTE — PHYSICAL EXAM
[General Appearance - Alert] : alert [General Appearance - In No Acute Distress] : in no acute distress [General Appearance - Well Nourished] : well nourished [General Appearance - Well Developed] : well developed [Neck Appearance] : the appearance of the neck was normal [Neck Cervical Mass (___cm)] : no neck mass was observed [Jugular Venous Distention Increased] : there was no jugular-venous distention [Thyroid Diffuse Enlargement] : the thyroid was not enlarged [Thyroid Nodule] : there were no palpable thyroid nodules [Heart Sounds] : normal S1 and S2 [Auscultation Breath Sounds / Voice Sounds] : lungs were clear to auscultation bilaterally [Heart Sounds Gallop] : no gallops [Murmurs] : no murmurs [Heart Sounds Pericardial Friction Rub] : no pericardial rub [Full Pulse] : the pedal pulses are present [Edema] : there was no peripheral edema [Bowel Sounds] : normal bowel sounds [Abdomen Soft] : soft [Abdomen Tenderness] : non-tender [Abdomen Mass (___ Cm)] : no abdominal mass palpated [Abdomen Hernia] : no hernia was discovered [Normal Sphincter Tone] : normal sphincter tone [No Rectal Mass] : no rectal mass [Cervical Lymph Nodes Enlarged Posterior Bilaterally] : posterior cervical [Cervical Lymph Nodes Enlarged Anterior Bilaterally] : anterior cervical [Supraclavicular Lymph Nodes Enlarged Bilaterally] : supraclavicular [Inguinal Lymph Nodes Enlarged Bilaterally] : inguinal [Nail Clubbing] : no clubbing  or cyanosis of the fingernails [Musculoskeletal - Swelling] : no joint swelling seen [Skin Color & Pigmentation] : normal skin color and pigmentation [Skin Turgor] : normal skin turgor [] : no rash [Oriented To Time, Place, And Person] : oriented to person, place, and time [Impaired Insight] : insight and judgment were intact [Affect] : the affect was normal [Internal Hemorrhoid] : no internal hemorrhoids [External Hemorrhoid] : no external hemorrhoids [Occult Blood Positive] : stool was negative for occult blood [FreeTextEntry1] : left leg surgical scars

## 2021-05-17 ENCOUNTER — RX RENEWAL (OUTPATIENT)
Age: 76
End: 2021-05-17

## 2021-06-16 DIAGNOSIS — Z01.818 ENCOUNTER FOR OTHER PREPROCEDURAL EXAMINATION: ICD-10-CM

## 2021-06-18 ENCOUNTER — APPOINTMENT (OUTPATIENT)
Dept: DISASTER EMERGENCY | Facility: CLINIC | Age: 76
End: 2021-06-18

## 2021-06-18 LAB — SARS-COV-2 N GENE NPH QL NAA+PROBE: NOT DETECTED

## 2021-06-19 ENCOUNTER — NON-APPOINTMENT (OUTPATIENT)
Age: 76
End: 2021-06-19

## 2021-06-21 ENCOUNTER — APPOINTMENT (OUTPATIENT)
Dept: GASTROENTEROLOGY | Facility: CLINIC | Age: 76
End: 2021-06-21
Payer: MEDICARE

## 2021-06-21 ENCOUNTER — LABORATORY RESULT (OUTPATIENT)
Age: 76
End: 2021-06-21

## 2021-06-21 PROCEDURE — 43239 EGD BIOPSY SINGLE/MULTIPLE: CPT

## 2021-07-01 ENCOUNTER — OUTPATIENT (OUTPATIENT)
Dept: OUTPATIENT SERVICES | Facility: HOSPITAL | Age: 76
LOS: 1 days | End: 2021-07-01
Payer: MEDICARE

## 2021-07-01 ENCOUNTER — APPOINTMENT (OUTPATIENT)
Dept: CT IMAGING | Facility: IMAGING CENTER | Age: 76
End: 2021-07-01
Payer: MEDICARE

## 2021-07-01 DIAGNOSIS — Z98.49 CATARACT EXTRACTION STATUS, UNSPECIFIED EYE: Chronic | ICD-10-CM

## 2021-07-01 DIAGNOSIS — C49.A0 GASTROINTESTINAL STROMAL TUMOR, UNSPECIFIED SITE: ICD-10-CM

## 2021-07-01 DIAGNOSIS — Z98.890 OTHER SPECIFIED POSTPROCEDURAL STATES: Chronic | ICD-10-CM

## 2021-07-01 PROCEDURE — 74177 CT ABD & PELVIS W/CONTRAST: CPT

## 2021-07-01 PROCEDURE — 74177 CT ABD & PELVIS W/CONTRAST: CPT | Mod: 26,MH

## 2021-07-06 ENCOUNTER — OUTPATIENT (OUTPATIENT)
Dept: OUTPATIENT SERVICES | Facility: HOSPITAL | Age: 76
LOS: 1 days | Discharge: HOME | End: 2021-07-06

## 2021-07-06 DIAGNOSIS — C49.A0 GASTROINTESTINAL STROMAL TUMOR, UNSPECIFIED SITE: ICD-10-CM

## 2021-07-06 DIAGNOSIS — Z98.890 OTHER SPECIFIED POSTPROCEDURAL STATES: Chronic | ICD-10-CM

## 2021-07-06 DIAGNOSIS — Z98.49 CATARACT EXTRACTION STATUS, UNSPECIFIED EYE: Chronic | ICD-10-CM

## 2021-07-07 ENCOUNTER — APPOINTMENT (OUTPATIENT)
Dept: HEMATOLOGY ONCOLOGY | Facility: CLINIC | Age: 76
End: 2021-07-07
Payer: MEDICARE

## 2021-07-07 VITALS — DIASTOLIC BLOOD PRESSURE: 72 MMHG | SYSTOLIC BLOOD PRESSURE: 141 MMHG

## 2021-07-07 VITALS
OXYGEN SATURATION: 99 % | WEIGHT: 159.59 LBS | RESPIRATION RATE: 16 BRPM | DIASTOLIC BLOOD PRESSURE: 77 MMHG | HEIGHT: 65.16 IN | BODY MASS INDEX: 26.27 KG/M2 | TEMPERATURE: 98.1 F | SYSTOLIC BLOOD PRESSURE: 172 MMHG | HEART RATE: 110 BPM

## 2021-07-07 PROCEDURE — 99213 OFFICE O/P EST LOW 20 MIN: CPT

## 2021-07-08 NOTE — REVIEW OF SYSTEMS
[Negative] : Heme/Lymph [Fever] : no fever [Chills] : no chills [Fatigue] : no fatigue [Recent Change In Weight] : ~T no recent weight change [Eye Pain] : no eye pain [Vision Problems] : no vision problems [Dysphagia] : no dysphagia [Odynophagia] : no odynophagia [Chest Pain] : no chest pain [Palpitations] : no palpitations [Shortness Of Breath] : no shortness of breath [Cough] : no cough [Abdominal Pain] : no abdominal pain [Vomiting] : no vomiting [Diarrhea] : no diarrhea [Joint Pain] : no joint pain [Muscle Pain] : no muscle pain [Confused] : no confusion [Dizziness] : no dizziness

## 2021-07-08 NOTE — PHYSICAL EXAM
[Restricted in physically strenuous activity but ambulatory and able to carry out work of a light or sedentary nature] : Status 1- Restricted in physically strenuous activity but ambulatory and able to carry out work of a light or sedentary nature, e.g., light house work, office work [Normal] : affect appropriate [de-identified] : anicteric  [de-identified] : neck supple  [de-identified] : breathing comfortably  [de-identified] : well-healed laparoscopic scars on abdomen

## 2021-07-08 NOTE — HISTORY OF PRESENT ILLNESS
[Disease: _____________________] : Disease: [unfilled] [T: ___] : T[unfilled] [N: ___] : N[unfilled] [M: ___] : M[unfilled] [AJCC Stage: ____] : AJCC Stage: [unfilled] [de-identified] : 72 y/o F with PMH of hypertension, hyperlipidemia, type 2 diabetes, chronic hepatitis C, GERD and cataracts who in March 2020, noted to have issues with anemia, GERD and early satiety and work up included an EGD (a large submucosal mass without bleeding in the upper stomach. 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  A CT of the chest, abdomen and pelvis performed on 3/3/20 revealed a 6 cm cystic thick-walled mass which may be arising from the pancreatic tail and invaginating the lesser curvature of the stomach. Subsequent MRI/MRCP on 3/22/20 revealed a 7.3 cm submucosal lesion the proximal stomach with a large central cystic component, favoring a GIST. Doubt pancreatic lesion. No metastatic disease.  On 6/1/20 Dr. Wang Wynne performed an EUS which revealed a 7 cm subepithelial gastric lesion (location unspecified). 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  Patient presents to us to discuss oncologic care on 8/24/2020 and placed on surveillance \par \par PCP: Dr. Сергей Bui ([phone], [address]) \par \par  Patient has Stage IB GIST with intermediate recurrence risk based on NIH-Saravia     criteria (size 5-10cm and mitotic rate <5). [facility] nomogram shows 94%     recurrence-free survival at 2 years and 89% recurrence-free survival at 5 years. 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 7/1/2021 : CT scan showed no evidence of recurrence. \par  Ms. OATES denies fevers, chills, headaches, cough, SOB, chest pain, any swelling, nausea, vomiting, diarrhea, rash, or malaise. \par \par \par \par \par \par \par \par

## 2021-07-29 ENCOUNTER — APPOINTMENT (OUTPATIENT)
Dept: SURGICAL ONCOLOGY | Facility: CLINIC | Age: 76
End: 2021-07-29
Payer: MEDICARE

## 2021-07-29 VITALS
SYSTOLIC BLOOD PRESSURE: 177 MMHG | HEART RATE: 127 BPM | HEIGHT: 65 IN | OXYGEN SATURATION: 99 % | BODY MASS INDEX: 26.49 KG/M2 | RESPIRATION RATE: 16 BRPM | WEIGHT: 159 LBS | DIASTOLIC BLOOD PRESSURE: 68 MMHG

## 2021-07-29 PROCEDURE — 99214 OFFICE O/P EST MOD 30 MIN: CPT

## 2021-07-29 NOTE — HISTORY OF PRESENT ILLNESS
[de-identified] : Jocelynn is a pleasant 75 year-old female who returns for follow up, status post robotic assisted partial gastrectomy on 7/10/20.  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  Some fat necrosis was expressed from the incision during her initial postop visit on 7/23/20.\par \par She consulted with Dr. Obie Santos from medical oncology on 8/24/20.  Given  intermediate recurrence risk based on NIH Saravia criteria, adjuvant therapy with imatinib was not recommended and she will be kept on surveillance.\par \par CT of the abdomen and pelvis in September 2020 showed no evidence of recurrent or metastatic disease.  CT of the abdomen and pelvis in January 2021 was negative as well.  \par \par Most recent CT of the abdomen and pelvis in July 2021 demonstrated no evidence of recurrent or metastatic disease.  Dr. Santos will arrange for CT scans every 6 months.  She had a surveillance endoscopy with Dr. Ike Pabon on 6/21/21 which demonstrated no evidence of gross recurrence and a single fundic polyp.  Biopsy of the gastric fundus/body demonstrated a 2 mm well differentiated neuroendocrine tumor (Ki-67 index < 2%), negative for dysplasia. \par \par She is feeling well but states that she is claustrophobic in the exam room which is causing her blood pressure to be elevated.  She is eating well and denies nausea/vomiting, moving her bowels without difficulty and denies abdominal pain, fever, chills or weight loss. Her only complaint is that drinking coffee has been bothering her stomach lately.\par \par Previous pertinent history is as follows:\par \par She was initially seen in consultation on 6/15/20.  She was referred to Dr. Ike Pabon in March 2020 for evaluation of anemia, GERD and early satiety.  Work up included an EGD which revealed a large submucosal mass without bleeding in the upper stomach.  Biopsy was benign.  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  A colonoscopy was also performed, negative for malignancy or polyps. \par \par CT of the chest, abdomen and pelvis performed on 3/3/20 revealed a 6 cm cystic thick-walled mass which may be arising from the pancreatic tail and invaginating the lesser curvature of the stomach.  Subsequent MRI/MRCP on 3/22/20 revealed a 7.3 cm submucosal lesion the proximal stomach with a large central cystic component, favoring a GIST.  Doubt pancreatic lesion.  No metastatic disease.\par \par On 6/1/20 Dr. Wang Wynne performed an EUS which revealed a 7 cm subepithelial gastric lesion (location unspecified). 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  She was referred to myself as well as Dr. Obie Sanots from medical oncology for further management. \par \par Her past medical history includes hypertension, hyperlipidemia, type 2 diabetes, chronic hepatitis C, GERD and cataracts.

## 2021-07-29 NOTE — PHYSICAL EXAM
[Normal] : supple, no neck mass and thyroid not enlarged [Normal Neck Lymph Nodes] : normal neck lymph nodes  [Normal Supraclavicular Lymph Nodes] : normal supraclavicular lymph nodes [Normal] : oriented to person, place and time, with appropriate affect [de-identified] : abdominal incisions healed with no mass or hernia

## 2021-08-11 ENCOUNTER — RX RENEWAL (OUTPATIENT)
Age: 76
End: 2021-08-11

## 2021-08-12 ENCOUNTER — APPOINTMENT (OUTPATIENT)
Dept: DERMATOLOGY | Facility: CLINIC | Age: 76
End: 2021-08-12

## 2021-08-12 ENCOUNTER — RX RENEWAL (OUTPATIENT)
Age: 76
End: 2021-08-12

## 2021-08-16 ENCOUNTER — RX RENEWAL (OUTPATIENT)
Age: 76
End: 2021-08-16

## 2021-08-16 ENCOUNTER — APPOINTMENT (OUTPATIENT)
Dept: GASTROENTEROLOGY | Facility: CLINIC | Age: 76
End: 2021-08-16
Payer: MEDICARE

## 2021-08-16 VITALS — HEIGHT: 65 IN | WEIGHT: 156 LBS | BODY MASS INDEX: 25.99 KG/M2

## 2021-08-16 PROCEDURE — 99213 OFFICE O/P EST LOW 20 MIN: CPT

## 2021-08-16 NOTE — HISTORY OF PRESENT ILLNESS
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  She denies reflux symptoms at this time.  Copy of the report has been given.

## 2021-08-16 NOTE — CONSULT LETTER
[Dear  ___] : Dear  [unfilled], [Courtesy Letter:] : I had the pleasure of seeing your patient, [unfilled], in my office today. [Please see my note below.] : Please see my note below. [Consult Closing:] : Thank you very much for allowing me to participate in the care of this patient.  If you have any questions, please do not hesitate to contact me. [Sincerely,] : Sincerely, [DrHarry  ___] : Dr. LOZANO [DrHarry ___] : Dr. LOZANO [FreeTextEntry3] : Ike Pabon M.D.\par

## 2021-08-16 NOTE — ASSESSMENT
[FreeTextEntry1] : 1.  Status post partial gastrectomy for stage IB GIST July 2020.  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  History of colonic polyp, with diverticulosis, tortuous colon, and hemorrhoids at repeat colonoscopy March 2020.\par 3.  History of iron deficiency anemia.\par 4.  Overweight.\par 5.  Type 2 diabetes mellitus.\par 6.  Hypertension.\par 7.  Hyperlipidemia.\par 8.  History of hepatitis C, eradicated.\par 9.  Status post left leg lengthening surgery.\par 10.  Status post iridectomy.\par \par Plan:\par 1.  Diagnosis, prognosis, and implications of Cheng's esophagus and atrophic autoimmune gastritis discussed.  [facility] brochure on "Cheng's Esophagus" given and discussed.\par 2.  Given the gastric atrophy, she likely does not make acid.  Therefore, standard recommendation for PPI therapy in a patient with Cheng's does not apply here.\par 3.  Return late spring 2022, for consideration of repeat EGD soon thereafter.

## 2021-08-24 ENCOUNTER — APPOINTMENT (OUTPATIENT)
Dept: INTERNAL MEDICINE | Facility: CLINIC | Age: 76
End: 2021-08-24

## 2021-09-08 ENCOUNTER — APPOINTMENT (OUTPATIENT)
Dept: INTERNAL MEDICINE | Facility: CLINIC | Age: 76
End: 2021-09-08
Payer: MEDICARE

## 2021-09-08 VITALS
BODY MASS INDEX: 26.29 KG/M2 | WEIGHT: 158 LBS | HEART RATE: 115 BPM | TEMPERATURE: 98.1 F | OXYGEN SATURATION: 97 % | SYSTOLIC BLOOD PRESSURE: 170 MMHG | DIASTOLIC BLOOD PRESSURE: 68 MMHG

## 2021-09-08 DIAGNOSIS — E83.52 HYPERCALCEMIA: ICD-10-CM

## 2021-09-08 DIAGNOSIS — D64.9 ANEMIA, UNSPECIFIED: ICD-10-CM

## 2021-09-08 DIAGNOSIS — D72.819 DECREASED WHITE BLOOD CELL COUNT, UNSPECIFIED: ICD-10-CM

## 2021-09-08 PROCEDURE — 99214 OFFICE O/P EST MOD 30 MIN: CPT

## 2021-09-08 NOTE — PHYSICAL EXAM
[Normal Supraclavicular Nodes] : no supraclavicular lymphadenopathy [Normal Axillary Nodes] : no axillary lymphadenopathy [Coordination Grossly Intact] : coordination grossly intact [No Focal Deficits] : no focal deficits [Normal Gait] : normal gait [Normal] : affect was normal and insight and judgment were intact

## 2021-09-08 NOTE — HISTORY OF PRESENT ILLNESS
[de-identified] : 76 y.o. female, PMHx anemia, GIST (s/p robotic laparoscopic possible open partial gastrectomy 7/10/2020, no chemo/xrt)\par \par Presents for follow up.  Feeling well.  \par \par Anemia - new onset in 2020. Saw gastro did colonoscope -3/2/2020 and EGD - found to have large submucosal mass. 6/1- FNA was consistent with a GIST- s/p robotic laparoscopic possible open partial gastrectomy 7/10/2020\par -Disease: GIST \par TNM stage: T3, Nx, M0 \par AJCC Stage: Ib \par - surveillance scan q6 months, in July 2021\par - follows with oncology, no chemo/radiation advised at this time\par - anemia improving since surgery, continues iron supplements\par - denies abdominal pain, heartburn, diarrhea, constipation\par - to get f/u scan 1/2022\par \par DM - well controlled \par - AIc 5.9 4/2021.  Glimepiride does decreased.  \par - Compliant with diet and medications.\par - 135-160s (occasionally higher if eats sweets) fasting BG, rare FBG 60-90. \par \par Hyperlipidemia- on atorvastatin denies myalgias.\par \par Hypertension- compliant with meds (enalapril, amlodipine), off HCTZ 3/2018 d/t hypercalcemia.  Checks BP at home, generally in the 130s, high of 141.  \par \par H/o hypercalcemia- monitoring calcium intake, stopped Po ca supplements last ca was normal \par - off HCTZ \par

## 2021-09-08 NOTE — ASSESSMENT
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 \par -followed by Surgeon and Oncologist \par TNM stage: T3, Nx, M0 \par AJCC Stage: Ib \par - surveillance scan q6 months, next Jan 2022\par - follows with oncology, no chemo/radiation advised at this time\par - anemia improving since surgery, continues iron supplements\par - denies abdominal pain, heartburn, diarrhea, constipation\par \par Iron deficiency anemia:\par -Improving since GI surgery, continues iron supplements\par -Labs today \par \par Hypertension:\par - elevated today, slightly lower on repeat, normal home readings\par - on enalapril 20 po bid, amlodipine 10 mg daily\par - HCTZ was discontinued d/t hypercalcemia\par - cardio consult reviewed - stress test negative\par - regular physical activity encouraged\par \par Proteinuria:\par -good DM and BP control\par -recheck urine today\par \par Diabetes type 2\par - on insulin and oral hypoglycemic, glimepiride dose decreased after last visit, A1C 5.9\par - diet low in carbs, continue current medications. Monitor Accu-Cheks daily \par - ophthalmology every 3 months \par - encouraged to increase physical activity \par \par h/o hep c s/p interferon rx , RNA negative - AFP neg \par \par Hyperlipidemia\par -continues statin, working on diet and exercise \par \par HM\par UTD mammo\par UTD pap\par UTD colo\par UTD dental\par referral for skin screening\par bone density earlier this year - osteopenia\par UTD tdap\par UTD pneumococcal vaccines\par Declines Shingrix vaccine\par Flu shot today\par Completed COVID vaccine series\par ACP discussed, information provided, encouraged to review with spouse\par

## 2021-09-10 LAB
ALBUMIN SERPL ELPH-MCNC: 4.7 G/DL
ALP BLD-CCNC: 61 U/L
ALT SERPL-CCNC: 19 U/L
ANION GAP SERPL CALC-SCNC: 15 MMOL/L
APPEARANCE: CLEAR
AST SERPL-CCNC: 21 U/L
BASOPHILS # BLD AUTO: 0.04 K/UL
BASOPHILS NFR BLD AUTO: 0.7 %
BILIRUB SERPL-MCNC: 0.2 MG/DL
BILIRUBIN URINE: NEGATIVE
BLOOD URINE: NEGATIVE
BUN SERPL-MCNC: 11 MG/DL
CALCIUM SERPL-MCNC: 10.4 MG/DL
CHLORIDE SERPL-SCNC: 104 MMOL/L
CHOLEST SERPL-MCNC: 161 MG/DL
CO2 SERPL-SCNC: 23 MMOL/L
COLOR: NORMAL
CREAT SERPL-MCNC: 0.62 MG/DL
CREAT SPEC-SCNC: 49 MG/DL
EOSINOPHIL # BLD AUTO: 0.09 K/UL
EOSINOPHIL NFR BLD AUTO: 1.5 %
ESTIMATED AVERAGE GLUCOSE: 128 MG/DL
GLUCOSE QUALITATIVE U: NEGATIVE
GLUCOSE SERPL-MCNC: 95 MG/DL
HBA1C MFR BLD HPLC: 6.1 %
HCT VFR BLD CALC: 44.8 %
HDLC SERPL-MCNC: 34 MG/DL
HGB BLD-MCNC: 14.4 G/DL
IMM GRANULOCYTES NFR BLD AUTO: 0.3 %
KETONES URINE: NEGATIVE
LDLC SERPL CALC-MCNC: 103 MG/DL
LEUKOCYTE ESTERASE URINE: NEGATIVE
LYMPHOCYTES # BLD AUTO: 1.67 K/UL
LYMPHOCYTES NFR BLD AUTO: 28.5 %
MAN DIFF?: NORMAL
MCHC RBC-ENTMCNC: 27.6 PG
MCHC RBC-ENTMCNC: 32.1 GM/DL
MCV RBC AUTO: 86 FL
MICROALBUMIN 24H UR DL<=1MG/L-MCNC: 8 MG/DL
MICROALBUMIN/CREAT 24H UR-RTO: 162 MG/G
MONOCYTES # BLD AUTO: 0.57 K/UL
MONOCYTES NFR BLD AUTO: 9.7 %
NEUTROPHILS # BLD AUTO: 3.47 K/UL
NEUTROPHILS NFR BLD AUTO: 59.3 %
NITRITE URINE: NEGATIVE
NONHDLC SERPL-MCNC: 127 MG/DL
PH URINE: 6
PLATELET # BLD AUTO: 310 K/UL
POTASSIUM SERPL-SCNC: 4.4 MMOL/L
PROT SERPL-MCNC: 8.3 G/DL
PROTEIN URINE: NORMAL
RBC # BLD: 5.21 M/UL
RBC # FLD: 14.3 %
SODIUM SERPL-SCNC: 142 MMOL/L
SPECIFIC GRAVITY URINE: 1.01
TRIGL SERPL-MCNC: 120 MG/DL
UROBILINOGEN URINE: NORMAL
WBC # FLD AUTO: 5.86 K/UL

## 2021-09-10 RX ORDER — CHLORHEXIDINE GLUCONATE, 0.12% ORAL RINSE 1.2 MG/ML
0.12 SOLUTION DENTAL
Qty: 473 | Refills: 0 | Status: DISCONTINUED | COMMUNITY
Start: 2021-07-22

## 2021-09-10 RX ORDER — GLIMEPIRIDE 4 MG/1
4 TABLET ORAL
Qty: 90 | Refills: 0 | Status: DISCONTINUED | COMMUNITY
Start: 2021-03-11

## 2021-09-14 LAB — M PROTEIN SPEC IFE-MCNC: NORMAL

## 2021-09-29 ENCOUNTER — RX RENEWAL (OUTPATIENT)
Age: 76
End: 2021-09-29

## 2021-11-10 ENCOUNTER — RX RENEWAL (OUTPATIENT)
Age: 76
End: 2021-11-10

## 2021-11-30 ENCOUNTER — RX RENEWAL (OUTPATIENT)
Age: 76
End: 2021-11-30

## 2021-12-13 ENCOUNTER — RX RENEWAL (OUTPATIENT)
Age: 76
End: 2021-12-13

## 2021-12-21 ENCOUNTER — RESULT REVIEW (OUTPATIENT)
Age: 76
End: 2021-12-21

## 2022-01-03 ENCOUNTER — OUTPATIENT (OUTPATIENT)
Dept: OUTPATIENT SERVICES | Facility: HOSPITAL | Age: 77
LOS: 1 days | End: 2022-01-03
Payer: MEDICARE

## 2022-01-03 ENCOUNTER — APPOINTMENT (OUTPATIENT)
Dept: CT IMAGING | Facility: IMAGING CENTER | Age: 77
End: 2022-01-03
Payer: MEDICARE

## 2022-01-03 DIAGNOSIS — Z98.890 OTHER SPECIFIED POSTPROCEDURAL STATES: Chronic | ICD-10-CM

## 2022-01-03 DIAGNOSIS — Z98.49 CATARACT EXTRACTION STATUS, UNSPECIFIED EYE: Chronic | ICD-10-CM

## 2022-01-03 DIAGNOSIS — C49.A0 GASTROINTESTINAL STROMAL TUMOR, UNSPECIFIED SITE: ICD-10-CM

## 2022-01-03 PROCEDURE — 74177 CT ABD & PELVIS W/CONTRAST: CPT | Mod: 26,MG

## 2022-01-03 PROCEDURE — G1004: CPT

## 2022-01-03 PROCEDURE — 74177 CT ABD & PELVIS W/CONTRAST: CPT | Mod: MG

## 2022-01-03 PROCEDURE — 82565 ASSAY OF CREATININE: CPT

## 2022-01-07 ENCOUNTER — OUTPATIENT (OUTPATIENT)
Dept: OUTPATIENT SERVICES | Facility: HOSPITAL | Age: 77
LOS: 1 days | Discharge: HOME | End: 2022-01-07

## 2022-01-07 DIAGNOSIS — Z98.890 OTHER SPECIFIED POSTPROCEDURAL STATES: Chronic | ICD-10-CM

## 2022-01-07 DIAGNOSIS — C49.A0 GASTROINTESTINAL STROMAL TUMOR, UNSPECIFIED SITE: ICD-10-CM

## 2022-01-07 DIAGNOSIS — Z98.49 CATARACT EXTRACTION STATUS, UNSPECIFIED EYE: Chronic | ICD-10-CM

## 2022-01-10 ENCOUNTER — APPOINTMENT (OUTPATIENT)
Dept: HEMATOLOGY ONCOLOGY | Facility: CLINIC | Age: 77
End: 2022-01-10
Payer: MEDICARE

## 2022-01-10 VITALS
OXYGEN SATURATION: 98 % | HEART RATE: 104 BPM | TEMPERATURE: 98 F | RESPIRATION RATE: 16 BRPM | BODY MASS INDEX: 26.41 KG/M2 | SYSTOLIC BLOOD PRESSURE: 130 MMHG | DIASTOLIC BLOOD PRESSURE: 70 MMHG | WEIGHT: 158.73 LBS

## 2022-01-10 PROCEDURE — 99214 OFFICE O/P EST MOD 30 MIN: CPT

## 2022-01-11 NOTE — PHYSICAL EXAM
[Normal] : normal spine exam without palpable tenderness, no kyphosis or scoliosis [de-identified] : anicteric  [de-identified] : neck supple, no midline or paraspinal tenderness; no step-off deformity; FROM intact [de-identified] : no LE edema B/L [de-identified] : soft NT/ND

## 2022-01-11 NOTE — HISTORY OF PRESENT ILLNESS
[de-identified] : 72 y/o F with PMH of hypertension, hyperlipidemia, type 2 diabetes, chronic hepatitis C, GERD and cataracts who in March 2020, noted to have issues with anemia, GERD and early satiety and work up included an EGD (a large submucosal mass without bleeding in the upper stomach. 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  A CT of the chest, abdomen and pelvis performed on 3/3/20 revealed a 6 cm cystic thick-walled mass which may be arising from the pancreatic tail and invaginating the lesser curvature of the stomach. Subsequent MRI/MRCP on 3/22/20 revealed a 7.3 cm submucosal lesion the proximal stomach with a large central cystic component, favoring a GIST. Doubt pancreatic lesion. No metastatic disease.  On 6/1/20 Dr. Wang Wynne performed an EUS which revealed a 7 cm subepithelial gastric lesion (location unspecified). 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  Patient presents to us to discuss oncologic care on 8/24/2020 and placed on surveillance \par \par PCP: Dr. Сергей Bui ([phone], [address]) \par \par  Patient has Stage IB GIST with intermediate recurrence risk based on NIH-Saravia     criteria (size 5-10cm and mitotic rate <5). [facility] nomogram shows 94%     recurrence-free survival at 2 years and 89% recurrence-free survival at 5 years. 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  She reports that overall she is feeling well.  She has been having some right sided neck pain not related to ROM of the UE and not radiating to the back, chest, abdomen or arms.  She denies taking any medications for sxs and plans on speaking with her PCP about imaging for the pain.  She denies abdominal pain, N/V, anorexia, change in bowel habits or rectal bleeding/melena.  Patient has a copy of a pathology report from her EGD from 6/2021 that she is questioning a documented small neuroendocrine tumor.

## 2022-01-24 ENCOUNTER — APPOINTMENT (OUTPATIENT)
Dept: SURGICAL ONCOLOGY | Facility: CLINIC | Age: 77
End: 2022-01-24
Payer: MEDICARE

## 2022-01-24 VITALS
OXYGEN SATURATION: 97 % | BODY MASS INDEX: 26.16 KG/M2 | RESPIRATION RATE: 17 BRPM | DIASTOLIC BLOOD PRESSURE: 88 MMHG | HEART RATE: 123 BPM | TEMPERATURE: 97.8 F | HEIGHT: 65 IN | SYSTOLIC BLOOD PRESSURE: 165 MMHG | WEIGHT: 157 LBS

## 2022-01-24 PROCEDURE — 99214 OFFICE O/P EST MOD 30 MIN: CPT

## 2022-01-24 NOTE — HISTORY OF PRESENT ILLNESS
[address] CLINIC  [address] [place] [address] [facility] [address] [address]  Dept: [phone]  Dept Fax: [phone]    Rebekah Mortimer is a 37 y.o. female who presents today for her medical conditions/complaintsas noted below. Rebekah Mortimer is c/o of Otalgia (both ear pain x three days )        HPI:     Otalgia    There is pain in the right ear. This is a new problem. The current episode started in the past 7 days (3 days). The problem occurs constantly. The problem has been gradually worsening. There has been no fever. Associated symptoms include rhinorrhea. Pertinent negatives include no abdominal pain, coughing, diarrhea, ear discharge, rash, sore throat or vomiting. Treatments tried: allergy meds, warm compress. The treatment provided no relief. There is no history of a chronic ear infection, hearing loss or a tympanostomy tube. PMHx of HTN  No known sick contacts  Past Medical History:   Diagnosis Date    H/O contact dermatitis     H/O essential hypertension     Past medical history reviewed and pertinent positives/negatives in the HPI    History reviewed. No pertinent surgical history. Family History   Problem Relation Age of Onset    High Blood Pressure Mother     Seizures Mother     High Blood Pressure Father     Seizures Brother        Social History     Tobacco Use    Smoking status: Never Smoker    Smokeless tobacco: Never Used   Substance Use Topics    Alcohol use: Yes     Alcohol/week: 0.0 standard drinks     Comment: rarly      Current Outpatient Medications   Medication Sig Dispense Refill    levoFLOXacin (LEVAQUIN) 750 MG tablet Take 1 tablet by mouth daily for 5 days 5 tablet 0    escitalopram (LEXAPRO) 5 MG tablet Take 1 tablet by mouth daily 30 tablet 5    ketoconazole (NIZORAL) 2 % cream Apply topically daily. 30 g 0    triamcinolone (ARISTOCORT) 0.5 % cream Apply topically 2 times daily for not more than 2 weeks.  60 g 0    Loratadine-Pseudoephedrine (CLARITIN-D 12 HOUR PO) Take by mouth      carvedilol (COREG) 6.25 MG tablet Take 1 tablet by mouth 2 times daily (with meals) 60 tablet 5     No current facility-administered medications for this visit. Allergies   Allergen Reactions    Food      Various unknown allergies to food.  Penicillins Hives       Health Maintenance   Topic Date Due    HIV screen  10/14/1991    Cervical cancer screen  10/14/1997    Flu vaccine (1) 09/01/2020    Potassium monitoring  09/28/2020    Creatinine monitoring  09/28/2020    Lipid screen  09/28/2024    DTaP/Tdap/Td vaccine (3 - Td) 01/30/2028    Hepatitis A vaccine  Aged Out    Hepatitis B vaccine  Aged Out    Hib vaccine  Aged Out    Meningococcal (ACWY) vaccine  Aged Out    Pneumococcal 0-64 years Vaccine  Aged Out       :      Review of Systems   Constitutional: Negative for chills and fever. HENT: Positive for ear pain and rhinorrhea. Negative for congestion, ear discharge and sore throat. Respiratory: Negative for cough, shortness of breath and wheezing. Gastrointestinal: Negative for abdominal pain, constipation, diarrhea and vomiting. Musculoskeletal: Negative for myalgias. Skin: Negative for pallor and rash. Allergic/Immunologic: Positive for environmental allergies. Hematological: Negative for adenopathy. Objective:   Patient was evaluated [place] today during this pandemic covid 19 situation. Physical Exam  Vitals signs and nursing note reviewed. Constitutional:       Appearance: Normal appearance. She is obese. HENT:      Head: Normocephalic and atraumatic. Right Ear: Hearing, ear canal and external ear normal. Swelling present. A middle ear effusion is present. Tympanic membrane is erythematous and bulging. Left Ear: Hearing, tympanic membrane, ear canal and external ear normal.      Nose: Nose normal.      Mouth/Throat:      Lips: Pink.       Mouth: Mucous membranes are moist.      Pharynx: Oropharynx is [de-identified] : Jocelynn is a pleasant 76 year-old female who returns for follow up, status post robotic assisted partial gastrectomy on 7/10/20.  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 \par \par She consulted with Dr. Obie Santos from medical oncology on 8/24/20.  Given  intermediate recurrence risk based on NIH Saravia criteria, adjuvant therapy with imatinib was not recommended and she will be kept on surveillance.\par \par She had a surveillance endoscopy with Dr. Ike Pabon on 6/21/21 which demonstrated no evidence of gross recurrence and a single fundic polyp.  Biopsy of the gastric fundus/body demonstrated a 2 mm well differentiated neuroendocrine tumor (Ki-67 index < 2%), negative for dysplasia. \par \par CT completed 1/3/2022 notes no evidence of recurrence.  There is a 3 to 4 mm arterial enhancing focus within the gastric body indeterminant in nature.  A liver cyst and umbilical hernia is noted.\par \par She is eating well and denies nausea/vomiting, moving her bowels without difficulty and denies abdominal pain, fever, chills or weight loss. Her only complaint is that drinking coffee has been bothering her stomach lately.\par \par On 6/1/20 Dr. Wang Wynne performed an EUS which revealed a 7 cm subepithelial gastric lesion (location unspecified). 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  She was referred to myself as well as Dr. Obie Santos from medical oncology for further management. \par \par Her past medical history includes hypertension, hyperlipidemia, type 2 diabetes, chronic hepatitis C, GERD and cataracts.

## 2022-01-24 NOTE — PHYSICAL EXAM
[FreeTextEntry1] : G [Normal] : supple, no neck mass and thyroid not enlarged [Normal Neck Lymph Nodes] : normal neck lymph nodes  [Normal Supraclavicular Lymph Nodes] : normal supraclavicular lymph nodes [Normal Groin Lymph Nodes] : normal groin lymph nodes [Normal Axillary Lymph Nodes] : normal axillary lymph nodes [Normal] : oriented to person, place and time, with appropriate affect [de-identified] : small incisional hernia at the umbilical incision.

## 2022-01-24 NOTE — CONSULT LETTER
[Dear  ___] : Dear  [unfilled], [Consult Letter:] : I had the pleasure of evaluating your patient, [unfilled]. [Please see my note below.] : Please see my note below. [Consult Closing:] : Thank you very much for allowing me to participate in the care of this patient.  If you have any questions, please do not hesitate to contact me. [Sincerely,] : Sincerely, [FreeTextEntry2] : Ike Pabon MD  [FreeTextEntry3] : Aj Aiken MD\par Surgical Oncology\par [facility]\par Office: [phone]\par Cell: [phone]\par  [DrHarry  ___] : Dr. LOZANO [DrHarry ___] : Dr. LOZANO

## 2022-01-31 ENCOUNTER — APPOINTMENT (OUTPATIENT)
Dept: INTERNAL MEDICINE | Facility: CLINIC | Age: 77
End: 2022-01-31
Payer: MEDICARE

## 2022-01-31 ENCOUNTER — APPOINTMENT (OUTPATIENT)
Dept: SURGICAL ONCOLOGY | Facility: CLINIC | Age: 77
End: 2022-01-31

## 2022-01-31 ENCOUNTER — LABORATORY RESULT (OUTPATIENT)
Age: 77
End: 2022-01-31

## 2022-01-31 VITALS
WEIGHT: 161 LBS | HEART RATE: 113 BPM | OXYGEN SATURATION: 98 % | TEMPERATURE: 97.5 F | HEIGHT: 65 IN | SYSTOLIC BLOOD PRESSURE: 155 MMHG | BODY MASS INDEX: 26.82 KG/M2 | DIASTOLIC BLOOD PRESSURE: 81 MMHG

## 2022-01-31 DIAGNOSIS — B18.2 CHRONIC VIRAL HEPATITIS C: ICD-10-CM

## 2022-01-31 PROCEDURE — 36415 COLL VENOUS BLD VENIPUNCTURE: CPT

## 2022-01-31 PROCEDURE — 99214 OFFICE O/P EST MOD 30 MIN: CPT | Mod: 25

## 2022-01-31 NOTE — HISTORY OF PRESENT ILLNESS
[de-identified] : 76 y.o. female, PMHx anemia, GIST (s/p robotic laparoscopic possible open partial gastrectomy 7/10/2020, no chemo/xrt) seen today for f/u on ch medical issues \par last seen by me 11/2020 \par CPE -4/2021 with NP \par \par Presents for follow up. Feeling well. \par \par Anemia - new onset in 2020. Saw gastro did colonoscope -3/2/2020 and EGD - found to have large submucosal mass. 6/1- FNA was consistent with a GIST- s/p robotic laparoscopic possible open partial gastrectomy 7/10/2020\par -Disease: GIST \par TNM stage: T3, Nx, M0 \par AJCC Stage: Ib \par - surveillance scan q 6 months,last was 1/3/22 \par s/p EGD  July 2021- barrtes - gets EGD anually \par - follows with oncology, no chemo/radiation advised at this time\par - anemia improving since surgery, continues iron supplements\par - denies abdominal pain, heartburn, diarrhea, constipation\par - to get f/u scan 1/2022\par \par DM - well controlled \par - AIc 6.1 9/2021. Glimepiride does decreased. \par - Compliant with diet and medications.\par - 135-137 -150s (occasionally higher if eats sweets) fasting BG, rare FBG 60-90. \par -lantus 22 units qhs , metformin 500 BID , and Glimepremide 2 mg qd \par \par Hyperlipidemia- on atorvastatin 40 denies myalgias.\par \par Hypertension- compliant with meds (enalapril, amlodipine), off HCTZ 3/2018 d/t hypercalcemia. Checks BP at home, generally in the 130s, high of 141. \par \par H/o hypercalcemia- monitoring calcium intake, stopped Po ca supplements last ca was normal \par - off HCTZ \par  \par

## 2022-01-31 NOTE — ASSESSMENT
[FreeTextEntry1] : hx Submucosal gastric mass GIST s/p robotic laparoscopic partial gastrectomy 7/10/2020 \par -followed by Surgeon and Oncologist , and gastro \par TNM stage: T3, Nx, M0 \par AJCC Stage: Ib \par - surveillance scan q 6  months, did CT abd pelvis  Jan 2022-IMPRESSION:\par The patient is again noted to be status post partial gastrectomy.\par Nonspecific enhancing focus measuring 3 to 4 mm along the inferolateral gastric body wall unchanged.\par No lymphadenopathy appreciated.\par - s/ EGD 6 /2021 \par - follows with oncology, no chemo/radiation advised at this time\par - anemia improving since surgery, continues iron supplements\par - denies abdominal pain, heartburn, diarrhea, constipation\par \par Iron deficiency anemia:\par -Improving since GI surgery, continues iron supplements\par -Labs today \par \par Hypertension:160/60 confirmed , check BP next visit \par HTN- elevated BP readings confirmed \par --no cp sob palpitation or dizzy spells , no leg swelling \par - educated low salt diet , avoid canned , processed and fast food ,chips , bagged items ,  start exercise daily 30- 40 minutes  , loose weight \par - on enalapril 20 po bid, amlodipine 10 mg daily- pt ot log readings at home call if SBP > 140 \par - HCTZ was discontinued d/t hypercalcemia\par - cardio consult reviewed - stress test negative\par - regular physical activity encouraged\par \par Proteinuria:\par -good DM and BP control\par -recheck urine today\par \par Diabetes type 2\par - on insulin and oral hypoglycemic, glimepiride dose decreased after last visit, A1C 6.1 9/21 \par - diet low in carbs, continue current medications. Monitor Accu-Cheks daily \par - ophthalmology every 6 months \par - encouraged to increase physical activity \par \par h/o hep c s/p interferon rx , RNA negative - AFP neg \par \par Hyperlipidemia\par -continues statin, working on diet and exercise \par \par HM\par mammo 3/2021 \par pap-> 5 yrs advised \par colonoscope -3/2020 \par UTD dental\par referral for skin screening\par bone density 4/2021- osteopenia\par  tdap- 5/2013 \par UTD pneumococcal vaccines- prevnar 2015, pneumovax 2016 \par Declines Shingrix vaccine\par Flu shot- 12/3/21 \par Completed COVID vaccine series- Moderna-3/13/21, 4/10/21 booster was 11/27/2021 \par ACP discussed, information provided, encouraged to review with spouse\par

## 2022-02-01 LAB
ALBUMIN SERPL ELPH-MCNC: 5 G/DL
ALP BLD-CCNC: 61 U/L
ALT SERPL-CCNC: 18 U/L
ANION GAP SERPL CALC-SCNC: 17 MMOL/L
APPEARANCE: CLEAR
AST SERPL-CCNC: 21 U/L
BASOPHILS # BLD AUTO: 0.02 K/UL
BASOPHILS NFR BLD AUTO: 0.4 %
BILIRUB SERPL-MCNC: 0.2 MG/DL
BILIRUBIN URINE: NEGATIVE
BLOOD URINE: NEGATIVE
BUN SERPL-MCNC: 13 MG/DL
CALCIUM SERPL-MCNC: 10.6 MG/DL
CHLORIDE SERPL-SCNC: 103 MMOL/L
CHOLEST SERPL-MCNC: 166 MG/DL
CO2 SERPL-SCNC: 21 MMOL/L
COLOR: COLORLESS
CREAT SERPL-MCNC: 0.64 MG/DL
CREAT SPEC-SCNC: 33 MG/DL
EOSINOPHIL # BLD AUTO: 0.12 K/UL
EOSINOPHIL NFR BLD AUTO: 2.3 %
ESTIMATED AVERAGE GLUCOSE: 126 MG/DL
GLUCOSE QUALITATIVE U: NEGATIVE
GLUCOSE SERPL-MCNC: 99 MG/DL
HBA1C MFR BLD HPLC: 6 %
HCT VFR BLD CALC: 43 %
HDLC SERPL-MCNC: 34 MG/DL
HGB BLD-MCNC: 13.9 G/DL
IMM GRANULOCYTES NFR BLD AUTO: 0.2 %
KETONES URINE: NEGATIVE
LDLC SERPL CALC-MCNC: 88 MG/DL
LEUKOCYTE ESTERASE URINE: ABNORMAL
LYMPHOCYTES # BLD AUTO: 2.11 K/UL
LYMPHOCYTES NFR BLD AUTO: 40 %
MAN DIFF?: NORMAL
MCHC RBC-ENTMCNC: 26.6 PG
MCHC RBC-ENTMCNC: 32.3 GM/DL
MCV RBC AUTO: 82.4 FL
MICROALBUMIN 24H UR DL<=1MG/L-MCNC: 3.4 MG/DL
MICROALBUMIN/CREAT 24H UR-RTO: 104 MG/G
MONOCYTES # BLD AUTO: 0.54 K/UL
MONOCYTES NFR BLD AUTO: 10.2 %
NEUTROPHILS # BLD AUTO: 2.47 K/UL
NEUTROPHILS NFR BLD AUTO: 46.9 %
NITRITE URINE: NEGATIVE
NONHDLC SERPL-MCNC: 132 MG/DL
PH URINE: 6
PLATELET # BLD AUTO: 295 K/UL
POTASSIUM SERPL-SCNC: 4.4 MMOL/L
PROT SERPL-MCNC: 8.2 G/DL
PROTEIN URINE: NEGATIVE
RBC # BLD: 5.22 M/UL
RBC # FLD: 14.6 %
SODIUM SERPL-SCNC: 141 MMOL/L
SPECIFIC GRAVITY URINE: 1.01
TRIGL SERPL-MCNC: 225 MG/DL
TSH SERPL-ACNC: 1.05 UIU/ML
UROBILINOGEN URINE: NORMAL
VIT B12 SERPL-MCNC: 615 PG/ML
WBC # FLD AUTO: 5.27 K/UL

## 2022-04-03 ENCOUNTER — RX RENEWAL (OUTPATIENT)
Age: 77
End: 2022-04-03

## 2022-04-05 ENCOUNTER — RX RENEWAL (OUTPATIENT)
Age: 77
End: 2022-04-05

## 2022-04-11 PROBLEM — Z11.59 SCREENING FOR VIRAL DISEASE: Status: ACTIVE | Noted: 2020-06-26

## 2022-04-29 ENCOUNTER — APPOINTMENT (OUTPATIENT)
Dept: INTERNAL MEDICINE | Facility: CLINIC | Age: 77
End: 2022-04-29
Payer: MEDICARE

## 2022-04-29 ENCOUNTER — LABORATORY RESULT (OUTPATIENT)
Age: 77
End: 2022-04-29

## 2022-04-29 VITALS
BODY MASS INDEX: 26.33 KG/M2 | HEIGHT: 65 IN | OXYGEN SATURATION: 98 % | TEMPERATURE: 98.1 F | WEIGHT: 158 LBS | SYSTOLIC BLOOD PRESSURE: 148 MMHG | HEART RATE: 117 BPM | DIASTOLIC BLOOD PRESSURE: 72 MMHG

## 2022-04-29 PROCEDURE — 36415 COLL VENOUS BLD VENIPUNCTURE: CPT

## 2022-04-29 PROCEDURE — G0444 DEPRESSION SCREEN ANNUAL: CPT

## 2022-04-29 PROCEDURE — G0439: CPT

## 2022-04-29 PROCEDURE — G0442 ANNUAL ALCOHOL SCREEN 15 MIN: CPT | Mod: 59

## 2022-04-29 RX ORDER — CHLORHEXIDINE GLUCONATE 4 %
325 (65 FE) LIQUID (ML) TOPICAL DAILY
Qty: 30 | Refills: 3 | Status: DISCONTINUED | COMMUNITY
Start: 2020-08-26 | End: 2022-04-29

## 2022-04-29 RX ORDER — ADHESIVE TAPE 3"X 2.3 YD
180 MG TAPE, NON-MEDICATED TOPICAL
Refills: 0 | Status: ACTIVE | COMMUNITY
Start: 2022-04-29

## 2022-04-29 NOTE — HEALTH RISK ASSESSMENT
[Never] : Never [No] : No [No falls in past year] : Patient reported no falls in the past year [0] : 2) Feeling down, depressed, or hopeless: Not at all (0) [PHQ-2 Negative - No further assessment needed] : PHQ-2 Negative - No further assessment needed [With Significant Other] : lives with significant other [Retired] : retired [] :  [Fully functional (bathing, dressing, toileting, transferring, walking, feeding)] : Fully functional (bathing, dressing, toileting, transferring, walking, feeding) [Fully functional (using the telephone, shopping, preparing meals, housekeeping, doing laundry, using] : Fully functional and needs no help or supervision to perform IADLs (using the telephone, shopping, preparing meals, housekeeping, doing laundry, using transportation, managing medications and managing finances) [With Patient/Caregiver] : , with patient/caregiver [Designated Healthcare Proxy] : Designated healthcare proxy [Name: ___] : Health Care Proxy's Name: [unfilled]  [Relationship: ___] : Relationship: [unfilled] [de-identified] : walking daily  [[id number]] : 0 [Reports changes in hearing] : Reports no changes in hearing [Reports changes in vision] : Reports no changes in vision [Reports changes in dental health] : Reports no changes in dental health [AdvancecareDate] : 4/29/22

## 2022-04-29 NOTE — ASSESSMENT
[FreeTextEntry1] : hx Submucosal gastric mass GIST s/p robotic laparoscopic partial gastrectomy 7/10/2020 \par -followed by Surgeon and Oncologist , and gastro \par TNM stage: T3, Nx, M0 \par AJCC Stage: Ib \par - surveillance scan q 6 months, did CT abd pelvis Jan 2022-IMPRESSION:\par The patient is again noted to be status post partial gastrectomy.\par Nonspecific enhancing focus measuring 3 to 4 mm along the inferolateral gastric body wall unchanged.\par No lymphadenopathy appreciated.\par - s/ EGD 6 /2021 \par - follows with oncology, no chemo/radiation advised at this time\par - anemia improving since surgery, continues iron supplements\par - denies abdominal pain, heartburn, diarrhea, constipation\par \par Iron deficiency anemia:cbc 1/2022 nl \par -Improving since GI surgery, continues iron supplements\par -Labs today \par \par Hypertension:160/60 confirmed , check BP next visit \par HTN- elevated BP readings confirmed \par --no cp sob palpitation or dizzy spells , no leg swelling \par - educated low salt diet , avoid canned , processed and fast food ,chips , bagged items , start exercise daily 30- 40 minutes , loose weight \par - on enalapril 20 po bid, amlodipine 10 mg daily- pt ot log readings at home call if SBP > 140 \par - HCTZ was discontinued d/t hypercalcemia\par - cardio consult reviewed - stress test negative\par - regular physical activity encouraged\par \par Proteinuria:- will check with insurance add GLP2 inhibitor \par -good DM and BP control\par -recheck urine today\par \par Diabetes type 2\par - on insulin and oral hypoglycemic, glimepiride dose decreased 2 mg  after last visit, A1C 6.0 1/2022 \par + microalbuminuria- add jardiance 10 qgd - discussed hydaration - risk of UTIS discussed \par - diet low in carbs, continue current medications. Monitor Accu-Cheks daily \par - ophthalmology every 6 months last 4/25/22 - no retinopathy - cons reviewed scanned to chart \par - encouraged to increase physical activity \par \par h/o hep c s/p interferon rx , RNA negative - AFP neg \par \par Hyperlipidemia\par -continues statin, working on diet and exercise \par \par HM\par mammo 3/2021 -ordered \par pap-> 5 yrs advised \par colonoscope -3/2020 \par UTD dental\par referral for skin screening\par bone density 4/2021- osteopenia\par  tdap- 5/2013 \par UTD pneumococcal vaccines- prevnar 2015, pneumovax 2016 \par Declines Shingrix vaccine\par Flu shot- 12/3/21 \par Completed COVID vaccine series- Moderna-3/13/21, 4/10/21 booster was 11/27/2021 \par ACP discussed, information provided, encouraged to review with spouse\par . \par

## 2022-04-29 NOTE — HISTORY OF PRESENT ILLNESS
[de-identified] : 76 y.o. female, PMHx anemia, GIST (s/p robotic laparoscopic possible open partial gastrectomy 7/10/2020, no chemo/xrt) seen today for AWV \par \par Presents for follow up. Feeling well. \par \par Anemia -  onset in 2020. Saw gastro did colonoscope -3/2/2020 and EGD - found to have large submucosal mass. 6/1- FNA was consistent with a GIST- s/p robotic laparoscopic possible open partial gastrectomy 7/10/2020\par -Disease: GIST \par TNM stage: T3, Nx, M0 \par AJCC Stage: Ib \par - surveillance scan q 6 months,last was 1/3/22 \par s/p EGD July 2021- joann - gets EGD anually \par - follows with oncology, no chemo/radiation advised at this time\par - anemia improving since surgery, continues iron supplements\par - denies abdominal pain, heartburn, diarrhea, constipation\par -  f/u CT abd scan 1/2022 stable \par \par DM - well controlled \par - AIc 6.0 1/2022 , Glimepiride does decreased. \par - Compliant with diet and medications.\par - 135-137 -150s (occasionally higher if eats sweets) fasting BG, rare FBG 60-90. \par -lantus 22 units qhs , metformin 500 BID , and Glimepremide 2 mg qd \par \par Hyperlipidemia- on atorvastatin 40 denies myalgias.\par \par Hypertension- compliant with meds (enalapril, amlodipine), off HCTZ 3/2018 d/t hypercalcemia. Checks BP at home, generally in the 130s, high of 141. \par \par H/o hypercalcemia- monitoring calcium intake, stopped Po ca supplements last ca was normal \par - off HCTZ \par

## 2022-05-02 ENCOUNTER — NON-APPOINTMENT (OUTPATIENT)
Age: 77
End: 2022-05-02

## 2022-05-03 LAB
ALBUMIN SERPL ELPH-MCNC: 4.8 G/DL
ALP BLD-CCNC: 57 U/L
ALT SERPL-CCNC: 22 U/L
ANION GAP SERPL CALC-SCNC: 16 MMOL/L
APPEARANCE: CLEAR
AST SERPL-CCNC: 23 U/L
BASOPHILS # BLD AUTO: 0.04 K/UL
BASOPHILS NFR BLD AUTO: 0.9 %
BILIRUB SERPL-MCNC: 0.3 MG/DL
BILIRUBIN URINE: NEGATIVE
BLOOD URINE: NEGATIVE
BUN SERPL-MCNC: 11 MG/DL
CALCIUM SERPL-MCNC: 10.5 MG/DL
CHLORIDE SERPL-SCNC: 104 MMOL/L
CHOLEST SERPL-MCNC: 158 MG/DL
CO2 SERPL-SCNC: 21 MMOL/L
COLOR: NORMAL
CREAT SERPL-MCNC: 0.6 MG/DL
CREAT SPEC-SCNC: 76 MG/DL
EGFR: 93 ML/MIN/1.73M2
EOSINOPHIL # BLD AUTO: 0.07 K/UL
EOSINOPHIL NFR BLD AUTO: 1.6 %
ESTIMATED AVERAGE GLUCOSE: 131 MG/DL
GLUCOSE QUALITATIVE U: NEGATIVE
GLUCOSE SERPL-MCNC: 195 MG/DL
HBA1C MFR BLD HPLC: 6.2 %
HBA1C MFR BLD HPLC: NORMAL
HCT VFR BLD CALC: 44.1 %
HDLC SERPL-MCNC: 32 MG/DL
HGB BLD-MCNC: 13.5 G/DL
IMM GRANULOCYTES NFR BLD AUTO: 1.6 %
KETONES URINE: NEGATIVE
LDLC SERPL CALC-MCNC: 99 MG/DL
LEUKOCYTE ESTERASE URINE: NEGATIVE
LYMPHOCYTES # BLD AUTO: 1.71 K/UL
LYMPHOCYTES NFR BLD AUTO: 39.8 %
MAN DIFF?: NORMAL
MCHC RBC-ENTMCNC: 26.7 PG
MCHC RBC-ENTMCNC: 30.6 GM/DL
MCV RBC AUTO: 87.2 FL
MICROALBUMIN 24H UR DL<=1MG/L-MCNC: 23.5 MG/DL
MICROALBUMIN/CREAT 24H UR-RTO: 308 MG/G
MONOCYTES # BLD AUTO: 0.39 K/UL
MONOCYTES NFR BLD AUTO: 9.1 %
NEUTROPHILS # BLD AUTO: 2.02 K/UL
NEUTROPHILS NFR BLD AUTO: 47 %
NITRITE URINE: NEGATIVE
NONHDLC SERPL-MCNC: 126 MG/DL
PH URINE: 6
PLATELET # BLD AUTO: 311 K/UL
POTASSIUM SERPL-SCNC: 4.4 MMOL/L
PROT SERPL-MCNC: 8.1 G/DL
PROTEIN URINE: ABNORMAL
RBC # BLD: 5.06 M/UL
RBC # FLD: 16.6 %
SODIUM SERPL-SCNC: 141 MMOL/L
SPECIFIC GRAVITY URINE: 1.01
TRIGL SERPL-MCNC: 135 MG/DL
TSH SERPL-ACNC: 1.25 UIU/ML
UROBILINOGEN URINE: NORMAL
WBC # FLD AUTO: 4.3 K/UL

## 2022-05-05 ENCOUNTER — RESULT REVIEW (OUTPATIENT)
Age: 77
End: 2022-05-05

## 2022-05-05 ENCOUNTER — APPOINTMENT (OUTPATIENT)
Dept: ULTRASOUND IMAGING | Facility: IMAGING CENTER | Age: 77
End: 2022-05-05
Payer: MEDICARE

## 2022-05-05 ENCOUNTER — APPOINTMENT (OUTPATIENT)
Dept: MAMMOGRAPHY | Facility: IMAGING CENTER | Age: 77
End: 2022-05-05
Payer: MEDICARE

## 2022-05-05 ENCOUNTER — OUTPATIENT (OUTPATIENT)
Dept: OUTPATIENT SERVICES | Facility: HOSPITAL | Age: 77
LOS: 1 days | End: 2022-05-05
Payer: MEDICARE

## 2022-05-05 DIAGNOSIS — Z98.49 CATARACT EXTRACTION STATUS, UNSPECIFIED EYE: Chronic | ICD-10-CM

## 2022-05-05 DIAGNOSIS — Z98.890 OTHER SPECIFIED POSTPROCEDURAL STATES: Chronic | ICD-10-CM

## 2022-05-05 DIAGNOSIS — Z00.8 ENCOUNTER FOR OTHER GENERAL EXAMINATION: ICD-10-CM

## 2022-05-05 PROCEDURE — 77067 SCR MAMMO BI INCL CAD: CPT | Mod: 26

## 2022-05-05 PROCEDURE — 76641 ULTRASOUND BREAST COMPLETE: CPT | Mod: 26,50

## 2022-05-05 PROCEDURE — 76641 ULTRASOUND BREAST COMPLETE: CPT

## 2022-05-05 PROCEDURE — 77063 BREAST TOMOSYNTHESIS BI: CPT

## 2022-05-05 PROCEDURE — 77067 SCR MAMMO BI INCL CAD: CPT

## 2022-05-05 PROCEDURE — 77063 BREAST TOMOSYNTHESIS BI: CPT | Mod: 26

## 2022-05-12 ENCOUNTER — RX RENEWAL (OUTPATIENT)
Age: 77
End: 2022-05-12

## 2022-06-28 ENCOUNTER — OUTPATIENT (OUTPATIENT)
Dept: OUTPATIENT SERVICES | Facility: HOSPITAL | Age: 77
LOS: 1 days | Discharge: HOME | End: 2022-06-28

## 2022-06-28 DIAGNOSIS — Z98.49 CATARACT EXTRACTION STATUS, UNSPECIFIED EYE: Chronic | ICD-10-CM

## 2022-06-28 DIAGNOSIS — C49.A0 GASTROINTESTINAL STROMAL TUMOR, UNSPECIFIED SITE: ICD-10-CM

## 2022-06-28 DIAGNOSIS — Z98.890 OTHER SPECIFIED POSTPROCEDURAL STATES: Chronic | ICD-10-CM

## 2022-06-29 ENCOUNTER — RESULT REVIEW (OUTPATIENT)
Age: 77
End: 2022-06-29

## 2022-06-29 ENCOUNTER — RX RENEWAL (OUTPATIENT)
Age: 77
End: 2022-06-29

## 2022-07-07 ENCOUNTER — OUTPATIENT (OUTPATIENT)
Dept: OUTPATIENT SERVICES | Facility: HOSPITAL | Age: 77
LOS: 1 days | End: 2022-07-07
Payer: MEDICARE

## 2022-07-07 ENCOUNTER — APPOINTMENT (OUTPATIENT)
Dept: CT IMAGING | Facility: IMAGING CENTER | Age: 77
End: 2022-07-07

## 2022-07-07 DIAGNOSIS — C49.A0 GASTROINTESTINAL STROMAL TUMOR, UNSPECIFIED SITE: ICD-10-CM

## 2022-07-07 DIAGNOSIS — Z98.890 OTHER SPECIFIED POSTPROCEDURAL STATES: Chronic | ICD-10-CM

## 2022-07-07 DIAGNOSIS — Z98.49 CATARACT EXTRACTION STATUS, UNSPECIFIED EYE: Chronic | ICD-10-CM

## 2022-07-07 PROCEDURE — 74177 CT ABD & PELVIS W/CONTRAST: CPT | Mod: 26,MG

## 2022-07-07 PROCEDURE — 74177 CT ABD & PELVIS W/CONTRAST: CPT | Mod: MG

## 2022-07-07 PROCEDURE — G1004: CPT

## 2022-07-11 ENCOUNTER — NON-APPOINTMENT (OUTPATIENT)
Age: 77
End: 2022-07-11

## 2022-07-11 ENCOUNTER — APPOINTMENT (OUTPATIENT)
Dept: HEMATOLOGY ONCOLOGY | Facility: CLINIC | Age: 77
End: 2022-07-11

## 2022-07-11 VITALS
TEMPERATURE: 97 F | HEART RATE: 118 BPM | BODY MASS INDEX: 27.07 KG/M2 | SYSTOLIC BLOOD PRESSURE: 148 MMHG | DIASTOLIC BLOOD PRESSURE: 77 MMHG | OXYGEN SATURATION: 97 % | WEIGHT: 162.48 LBS | HEIGHT: 64.96 IN | RESPIRATION RATE: 16 BRPM

## 2022-07-11 PROCEDURE — 99214 OFFICE O/P EST MOD 30 MIN: CPT

## 2022-07-11 RX ORDER — SODIUM FLUORIDE1.1%, POTASSIUM NITRATE 5% 5.8; 57.5 MG/ML; MG/ML
1.1-5 GEL, DENTIFRICE DENTAL
Qty: 100 | Refills: 0 | Status: COMPLETED | COMMUNITY
Start: 2021-11-29

## 2022-07-11 NOTE — PHYSICAL EXAM
[Normal] : affect appropriate [Fully active, able to carry on all pre-disease performance without restriction] : Status 0 - Fully active, able to carry on all pre-disease performance without restriction [de-identified] : anicteric  [de-identified] : neck supple, no JVD [de-identified] : regular rate [de-identified] : no LE edema B/L [de-identified] : soft NT/ND

## 2022-07-11 NOTE — REVIEW OF SYSTEMS
[Negative] : Psychiatric [Fever] : no fever [Chills] : no chills [Fatigue] : no fatigue [Recent Change In Weight] : ~T no recent weight change [Eye Pain] : no eye pain [Vision Problems] : no vision problems [Dysphagia] : no dysphagia [Odynophagia] : no odynophagia [Chest Pain] : no chest pain [Palpitations] : no palpitations [Shortness Of Breath] : no shortness of breath [Cough] : no cough [Abdominal Pain] : no abdominal pain [Vomiting] : no vomiting [Diarrhea] : no diarrhea [Joint Pain] : no joint pain [Muscle Pain] : no muscle pain [Confused] : no confusion [Dizziness] : no dizziness

## 2022-07-11 NOTE — HISTORY OF PRESENT ILLNESS
[Disease: _____________________] : Disease: [unfilled] [T: ___] : T[unfilled] [N: ___] : N[unfilled] [M: ___] : M[unfilled] [AJCC Stage: ____] : AJCC Stage: [unfilled] [de-identified] : 70 y/o F with PMH of hypertension, hyperlipidemia, type 2 diabetes, chronic hepatitis C, GERD and cataracts who in March 2020, noted to have issues with anemia, GERD and early satiety and work up included an EGD (a large submucosal mass without bleeding in the upper stomach. 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  A CT of the chest, abdomen and pelvis performed on 3/3/20 revealed a 6 cm cystic thick-walled mass which may be arising from the pancreatic tail and invaginating the lesser curvature of the stomach. Subsequent MRI/MRCP on 3/22/20 revealed a 7.3 cm submucosal lesion the proximal stomach with a large central cystic component, favoring a GIST. Doubt pancreatic lesion. No metastatic disease.  On 6/1/20 Dr. Wang Wynne performed an EUS which revealed a 7 cm subepithelial gastric lesion (location unspecified). 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  Patient presents to us to discuss oncologic care on 8/24/2020 and placed on surveillance \par \par PCP: Dr. Сергей Bui ([phone], [address]) \par \par  Patient has Stage IB GIST with intermediate recurrence risk based on NIH-Saravia     criteria (size 5-10cm and mitotic rate <5). [facility] nomogram shows 94%     recurrence-free survival at 2 years and 89% recurrence-free survival at 5 years. 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  She reports overall feeling well.  Denies anorexia, weight loss, abdominal pain, N/V, change in bowel habits.  She continues to follow her PCP, GI and surgeon.  She is scheduled to see Dr. Aiken at the end of this month.

## 2022-07-12 ENCOUNTER — RX RENEWAL (OUTPATIENT)
Age: 77
End: 2022-07-12

## 2022-07-25 ENCOUNTER — APPOINTMENT (OUTPATIENT)
Dept: SURGICAL ONCOLOGY | Facility: CLINIC | Age: 77
End: 2022-07-25

## 2022-07-25 VITALS
SYSTOLIC BLOOD PRESSURE: 153 MMHG | DIASTOLIC BLOOD PRESSURE: 85 MMHG | HEIGHT: 64 IN | WEIGHT: 159 LBS | BODY MASS INDEX: 27.14 KG/M2 | HEART RATE: 128 BPM | RESPIRATION RATE: 17 BRPM | OXYGEN SATURATION: 97 % | TEMPERATURE: 96.9 F

## 2022-07-25 PROCEDURE — 99214 OFFICE O/P EST MOD 30 MIN: CPT

## 2022-07-25 NOTE — CONSULT LETTER
[Dear  ___] : Dear  [unfilled], [Consult Letter:] : I had the pleasure of evaluating your patient, [unfilled]. [Please see my note below.] : Please see my note below. [Consult Closing:] : Thank you very much for allowing me to participate in the care of this patient.  If you have any questions, please do not hesitate to contact me. [Sincerely,] : Sincerely, [DrHarry  ___] : Dr. LOZANO [DrHarry ___] : Dr. LOZANO [FreeTextEntry2] : Ike Pabon MD  [FreeTextEntry3] : Aj Aiken MD\par Surgical Oncology\par [facility]\par Office: [phone]\par Cell: [phone]\par

## 2022-07-25 NOTE — HISTORY OF PRESENT ILLNESS
[de-identified] : Jocelynn is a pleasant 76 year-old female who returns for a 6 month follow up.  On 6/1/20 Dr. Wang Wynne performed an EUS which revealed a 7 cm subepithelial gastric lesion (location unspecified). 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  She was referred to myself as well as Dr. Obie Santos from medical oncology for further management. \par \par She is now status post robotic assisted partial gastrectomy on 7/10/20.  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 \par \par She consulted with Dr. Obie Santos from medical oncology on 8/24/20.  Given  intermediate recurrence risk based on NIH Saravia criteria, adjuvant therapy with imatinib was not recommended and she will be kept on surveillance.\par \par She had a surveillance endoscopy with Dr. Ike Pabon on 6/21/21 which demonstrated no evidence of gross recurrence and a single fundic polyp.  Biopsy of the gastric fundus/body demonstrated a 2 mm well differentiated neuroendocrine tumor (Ki-67 index < 2%), negative for dysplasia. \par \par CT completed 1/3/2022 notes no evidence of recurrence.  There is a 3 to 4 mm arterial enhancing focus within the gastric body indeterminant in nature.  A liver cyst and umbilical hernia is noted.\par \par Most recent CT abdomen/pelvis performed on 7/7/22 showed postop changes without CT evidence of locally recurrent or metastatic disease.\par \par She is eating well and denies nausea/vomiting, moving her bowels without difficulty and denies abdominal pain, fever, chills or weight loss. Her only complaint is that drinking coffee has been bothering her stomach lately. \par \par Her past medical history includes hypertension, hyperlipidemia, type 2 diabetes, chronic hepatitis C, GERD and cataracts.

## 2022-07-25 NOTE — PHYSICAL EXAM
[Normal] : supple, no neck mass and thyroid not enlarged [Normal Neck Lymph Nodes] : normal neck lymph nodes  [Normal Supraclavicular Lymph Nodes] : normal supraclavicular lymph nodes [Normal Groin Lymph Nodes] : normal groin lymph nodes [Normal Axillary Lymph Nodes] : normal axillary lymph nodes [Normal] : oriented to person, place and time, with appropriate affect [de-identified] : small incisional hernia at the umbilical incision.

## 2022-08-24 ENCOUNTER — NON-APPOINTMENT (OUTPATIENT)
Age: 77
End: 2022-08-24

## 2022-09-28 ENCOUNTER — RX RENEWAL (OUTPATIENT)
Age: 77
End: 2022-09-28

## 2022-09-30 ENCOUNTER — RX RENEWAL (OUTPATIENT)
Age: 77
End: 2022-09-30

## 2022-11-21 ENCOUNTER — RX RENEWAL (OUTPATIENT)
Age: 77
End: 2022-11-21

## 2022-12-01 ENCOUNTER — APPOINTMENT (OUTPATIENT)
Dept: DERMATOLOGY | Facility: CLINIC | Age: 77
End: 2022-12-01

## 2022-12-01 DIAGNOSIS — L81.0 POSTINFLAMMATORY HYPERPIGMENTATION: ICD-10-CM

## 2022-12-01 PROCEDURE — 99214 OFFICE O/P EST MOD 30 MIN: CPT | Mod: GC

## 2022-12-02 ENCOUNTER — RX RENEWAL (OUTPATIENT)
Age: 77
End: 2022-12-02

## 2023-01-12 ENCOUNTER — APPOINTMENT (OUTPATIENT)
Dept: DERMATOLOGY | Facility: CLINIC | Age: 78
End: 2023-01-12
Payer: MEDICARE

## 2023-01-12 DIAGNOSIS — L81.0 POSTINFLAMMATORY HYPERPIGMENTATION: ICD-10-CM

## 2023-01-12 PROCEDURE — 99214 OFFICE O/P EST MOD 30 MIN: CPT

## 2023-01-12 RX ORDER — TRIAMCINOLONE ACETONIDE 0.25 MG/G
0.03 OINTMENT TOPICAL
Qty: 1 | Refills: 2 | Status: ACTIVE | COMMUNITY
Start: 2022-12-01 | End: 1900-01-01

## 2023-01-20 PROBLEM — L81.0 POST-INFLAMMATORY HYPERPIGMENTATION: Status: ACTIVE | Noted: 2018-11-06

## 2023-01-30 ENCOUNTER — APPOINTMENT (OUTPATIENT)
Dept: SURGICAL ONCOLOGY | Facility: CLINIC | Age: 78
End: 2023-01-30
Payer: MEDICARE

## 2023-01-30 VITALS
HEART RATE: 116 BPM | RESPIRATION RATE: 17 BRPM | SYSTOLIC BLOOD PRESSURE: 159 MMHG | BODY MASS INDEX: 27.31 KG/M2 | WEIGHT: 160 LBS | HEIGHT: 64 IN | DIASTOLIC BLOOD PRESSURE: 83 MMHG | OXYGEN SATURATION: 99 % | TEMPERATURE: 97.6 F

## 2023-01-30 PROCEDURE — 99214 OFFICE O/P EST MOD 30 MIN: CPT

## 2023-01-30 NOTE — HISTORY OF PRESENT ILLNESS
[de-identified] : Jocelynn is a pleasant 77 year-old female who returns for a 6 month follow up.  \par \par Her past medical history includes hypertension, hyperlipidemia, type 2 diabetes, chronic hepatitis C, GERD and cataracts.  \par \par On 6/1/20 Dr. Wang Wynne performed an EUS which revealed a 7 cm subepithelial gastric lesion (location unspecified). 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  She was referred to myself as well as Dr. Obie Santos from medical oncology for further management. \par \par She is now status post robotic assisted partial gastrectomy on 7/10/20.  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 \par \par She consulted with Dr. Obie Santos from medical oncology on 8/24/20.  Given  intermediate recurrence risk based on NIH Saravia criteria, adjuvant therapy with imatinib was not recommended and she will be kept on surveillance.\par \par She had a surveillance endoscopy with Dr. Ike Pabon on 6/21/21 which demonstrated no evidence of gross recurrence and a single fundic polyp.  Biopsy of the gastric fundus/body demonstrated a 2 mm well differentiated neuroendocrine tumor (Ki-67 index < 2%), negative for dysplasia. \par \par CT completed 1/3/2022 notes no evidence of recurrence.  There is a 3 to 4 mm arterial enhancing focus within the gastric body indeterminant in nature.  A liver cyst and umbilical hernia is noted.\par \par CT abdomen/pelvis performed on 7/7/22 showed postop changes without CT evidence of locally recurrent or metastatic disease.\par \par 7/25/22- She is eating well and denies nausea/vomiting, moving her bowels without difficulty and denies abdominal pain, fever, chills or weight loss. Her only complaint is that drinking coffee has been bothering her stomach lately. \par \par 1/30/23- Jocelynn returns for a 6 month follow up visit.  She denies any interval changes to her health.  She is doing well.  She does note a periumbilical incisional hernia.

## 2023-01-30 NOTE — CONSULT LETTER
[Dear  ___] : Dear  [unfilled], [Consult Letter:] : I had the pleasure of evaluating your patient, [unfilled]. [Please see my note below.] : Please see my note below. [Consult Closing:] : Thank you very much for allowing me to participate in the care of this patient.  If you have any questions, please do not hesitate to contact me. [Sincerely,] : Sincerely, [DrHarry  ___] : Dr. LOZANO [DrHarry ___] : Dr. LOZANO [FreeTextEntry2] : Ike Pabon MD  [FreeTextEntry3] : Aj Aiken MD\par Surgical Oncology\par [facility]\par Office: [phone]\par Cell: [phone]\par

## 2023-01-30 NOTE — ASSESSMENT
[FreeTextEntry1] : Jocelynn will follow up in 6 months.  We discussed consideration of the umbilical hernia repair.  Given that she remains asymptomatic she wishes to continue with close interval follow-up.

## 2023-01-30 NOTE — PHYSICAL EXAM
[Normal] : supple, no neck mass and thyroid not enlarged [Normal Neck Lymph Nodes] : normal neck lymph nodes  [Normal Supraclavicular Lymph Nodes] : normal supraclavicular lymph nodes [Normal Groin Lymph Nodes] : normal groin lymph nodes [Normal Axillary Lymph Nodes] : normal axillary lymph nodes [Normal] : oriented to person, place and time, with appropriate affect [de-identified] : small incisional hernia at the umbilical incision.

## 2023-02-17 ENCOUNTER — APPOINTMENT (OUTPATIENT)
Dept: INTERNAL MEDICINE | Facility: CLINIC | Age: 78
End: 2023-02-17
Payer: MEDICARE

## 2023-02-17 VITALS
HEART RATE: 108 BPM | OXYGEN SATURATION: 98 % | HEIGHT: 64 IN | DIASTOLIC BLOOD PRESSURE: 81 MMHG | WEIGHT: 162 LBS | TEMPERATURE: 97.8 F | BODY MASS INDEX: 27.66 KG/M2 | SYSTOLIC BLOOD PRESSURE: 150 MMHG

## 2023-02-17 VITALS — DIASTOLIC BLOOD PRESSURE: 70 MMHG | SYSTOLIC BLOOD PRESSURE: 138 MMHG

## 2023-02-17 DIAGNOSIS — Z23 ENCOUNTER FOR IMMUNIZATION: ICD-10-CM

## 2023-02-17 DIAGNOSIS — L81.9 DISORDER OF PIGMENTATION, UNSPECIFIED: ICD-10-CM

## 2023-02-17 LAB — HBA1C MFR BLD HPLC: 6.6

## 2023-02-17 PROCEDURE — 99214 OFFICE O/P EST MOD 30 MIN: CPT | Mod: 25

## 2023-02-17 PROCEDURE — 90662 IIV NO PRSV INCREASED AG IM: CPT

## 2023-02-17 PROCEDURE — 36415 COLL VENOUS BLD VENIPUNCTURE: CPT

## 2023-02-17 PROCEDURE — G0008: CPT

## 2023-02-17 PROCEDURE — 83036 HEMOGLOBIN GLYCOSYLATED A1C: CPT | Mod: QW

## 2023-02-17 RX ORDER — EMPAGLIFLOZIN 10 MG/1
10 TABLET, FILM COATED ORAL
Qty: 90 | Refills: 0 | Status: DISCONTINUED | COMMUNITY
Start: 2022-04-29 | End: 2023-02-17

## 2023-02-17 NOTE — PHYSICAL EXAM
[Normal] : soft, non-tender, non-distended, no masses palpated, no HSM and normal bowel sounds [de-identified] : face hyperpigmented rt cheek , forehead, neck

## 2023-02-17 NOTE — HISTORY OF PRESENT ILLNESS
[de-identified] : 77 y.o. female, PMHx anemia, GIST (s/p robotic laparoscopic possible open partial gastrectomy 7/10/2020, no chemo/xrt) seen today for f/u on ch medical issues \par \par Presents for follow up. Feeling well. \par \par was dx with umbilical hernia- might need surgery - has not decided \par \par DM - well controlled \par - AIc 6.2 5/2022 \par - Compliant with diet and medications.\par -- 174 - 190 elevated readings  (occasionally higher if eats sweets) \par -Lantus 22 units qhs , metformin 500 BID , and Glimepiride 2 mg qd \par \par hx Anemia - onset in 2020. Saw gastro did colonoscope -3/2/2020 and EGD - found to have large submucosal mass. 6/1- FNA was consistent with a GIST- s/p robotic laparoscopic possible open partial gastrectomy 7/10/2020\par -Disease: GIST \par TNM stage: T3, Nx, M0 \par AJCC Stage: Ib \par s/p EGD July 2021- joann - gets EGD anually \par - follows with oncology, no chemo/radiation advised at this time\par - anemia improving since surgery, continues iron supplements\par - denies abdominal pain, heartburn, diarrhea, constipation\par - f/u CT abd scan 7/2022 -IMPRESSION: Postsurgical changes without CT evidence of locally recurrent or metastatic disease.\par - will be getting scans q 1 yr now \par \par Hyperlipidemia- on atorvastatin 40 denies myalgias.\par \par Hypertension- compliant with meds (enalapril, amlodipine), off HCTZ 3/2018 d/t hypercalcemia. Checks BP at home, generally in the 130s, high of 141. \par \par H/o hypercalcemia- monitoring calcium intake, stopped Po ca supplements last ca was normal \par - off HCTZ

## 2023-02-17 NOTE — ASSESSMENT
[FreeTextEntry1] : Hyper pigmented sppts on face - forehead neck \par -rx hydroquinone cream given very sparingly \par \par Diabetes type 2\par - on insulin and oral hypoglycemic, glimepiride dose decreased 2 mg after last visit,  A1C 6.2 5/2022 --> poc 2/17/23 6.6 went up \par + microalbuminuria-  jardiance 10 qgd-not covered by insurance - very high co pay \par - diet low in carbs, continue current medications. Monitor Accu-Cheks daily \par - ophthalmology every 6 months last 4/25/22 - no retinopathy - cons reviewed scanned to chart \par - encouraged to increase physical activity \par \par hx Submucosal gastric mass GIST s/p robotic laparoscopic partial gastrectomy 7/10/2020 \par -followed by Surgeon and Oncologist , and gastro \par TNM stage: T3, Nx, M0 \par AJCC Stage: Ib \par - surveillance scan q 6 months, did CT abd pelvis Jan 2022-IMPRESSION:\par The patient is again noted to be status post partial gastrectomy.\par Nonspecific enhancing focus measuring 3 to 4 mm along the inferolateral gastric body wall unchanged.\par No lymphadenopathy appreciated.\par - s/ EGD 6 /2021 \par - follows with oncology, no chemo/radiation advised at this time\par - anemia improving since surgery, continues iron supplements\par - denies abdominal pain, heartburn, diarrhea, constipation\par -- f/u CT abd scan 7/2022 -IMPRESSION: Postsurgical changes without CT evidence of locally recurrent or metastatic disease.\par - will be getting scans q 1 yr now \par \par Iron deficiency anemia:cbc 1/2022 nl \par -Improving since GI surgery, continues iron supplements\par -Labs today \par \par Hypertension:138/70 confirmed , check BP next visit \par HTN- elevated BP readings confirmed \par --no cp sob palpitation or dizzy spells , no leg swelling \par - educated low salt diet , avoid canned , processed and fast food ,chips , bagged items , start exercise daily 30- 40 minutes , loose weight \par - on enalapril 20 po bid, amlodipine 10 mg daily- pt ot log readings at home call if SBP > 140 \par - HCTZ was discontinued d/t hypercalcemia\par - cardio consult reviewed - stress test negative\par - regular physical activity encouraged\par \par Proteinuria:- will check with insurance add GLP2 inhibitor \par -good DM and BP control\par -recheck urine today\par \par h/o hep c s/p interferon rx , RNA negative - AFP neg \par \par Hyperlipidemia\par -continues statin, working on diet and exercise \par \par HM\par mammo 5/2022 - bi rad 2 \par pap-> 6 yrs advised \par colonoscope -3/2020 \par UTD dental\par skin screening 1/12/23 \par bone density 4/2021- osteopenia\par  tdap- 5/2013 \par UTD pneumococcal vaccines- prevnar 2015, pneumovax 2016 \par Declines Shingrix vaccine\par Flu shot- 2/17/23 \par Completed COVID vaccine series- Moderna-3/13/21, 4/10/21 booster was 11/27/2021 \par ACP discussed, information provided, encouraged to review with spouse

## 2023-02-21 LAB
ALBUMIN SERPL ELPH-MCNC: 4.8 G/DL
ALP BLD-CCNC: 60 U/L
ALT SERPL-CCNC: 23 U/L
ANION GAP SERPL CALC-SCNC: 17 MMOL/L
AST SERPL-CCNC: 22 U/L
BILIRUB SERPL-MCNC: 0.2 MG/DL
BUN SERPL-MCNC: 8 MG/DL
CALCIUM SERPL-MCNC: 10.2 MG/DL
CHLORIDE SERPL-SCNC: 103 MMOL/L
CO2 SERPL-SCNC: 21 MMOL/L
CREAT SERPL-MCNC: 0.73 MG/DL
EGFR: 85 ML/MIN/1.73M2
GLUCOSE SERPL-MCNC: 98 MG/DL
POTASSIUM SERPL-SCNC: 4.5 MMOL/L
PROT SERPL-MCNC: 8.3 G/DL
SODIUM SERPL-SCNC: 142 MMOL/L

## 2023-03-13 ENCOUNTER — APPOINTMENT (OUTPATIENT)
Dept: DERMATOLOGY | Facility: CLINIC | Age: 78
End: 2023-03-13

## 2023-03-23 ENCOUNTER — RX RENEWAL (OUTPATIENT)
Age: 78
End: 2023-03-23

## 2023-05-01 ENCOUNTER — RX RENEWAL (OUTPATIENT)
Age: 78
End: 2023-05-01

## 2023-05-05 ENCOUNTER — NON-APPOINTMENT (OUTPATIENT)
Age: 78
End: 2023-05-05

## 2023-05-15 ENCOUNTER — LABORATORY RESULT (OUTPATIENT)
Age: 78
End: 2023-05-15

## 2023-05-15 ENCOUNTER — APPOINTMENT (OUTPATIENT)
Dept: INTERNAL MEDICINE | Facility: CLINIC | Age: 78
End: 2023-05-15
Payer: MEDICARE

## 2023-05-15 VITALS — SYSTOLIC BLOOD PRESSURE: 136 MMHG | DIASTOLIC BLOOD PRESSURE: 68 MMHG

## 2023-05-15 VITALS
SYSTOLIC BLOOD PRESSURE: 154 MMHG | HEIGHT: 64 IN | DIASTOLIC BLOOD PRESSURE: 100 MMHG | TEMPERATURE: 97.8 F | HEART RATE: 114 BPM | WEIGHT: 163 LBS | OXYGEN SATURATION: 98 % | BODY MASS INDEX: 27.83 KG/M2

## 2023-05-15 DIAGNOSIS — M85.80 OTHER SPECIFIED DISORDERS OF BONE DENSITY AND STRUCTURE, UNSPECIFIED SITE: ICD-10-CM

## 2023-05-15 PROCEDURE — G0439: CPT

## 2023-05-15 PROCEDURE — 36415 COLL VENOUS BLD VENIPUNCTURE: CPT

## 2023-05-15 RX ORDER — CYANOCOBALAMIN (VITAMIN B-12) 100 MCG
100 TABLET ORAL
Qty: 30 | Refills: 6 | Status: COMPLETED | COMMUNITY
Start: 2022-02-01 | End: 2023-05-15

## 2023-05-15 NOTE — HISTORY OF PRESENT ILLNESS
[Spouse] : spouse [de-identified] : 77 y.o. female, PMHx anemia, GIST (s/p robotic laparoscopic possible open partial gastrectomy 7/10/2020, no chemo/xrt) seen today for AWV \par \par was dx with umbilical hernia- might need surgery- saw md recommended no surgery needed \par \par DM - well controlled \par - AIc 6.6 2/2023 \par - Compliant with diet and medications.\par -- 174 - 190 elevated readings (occasionally higher if eats sweets) \par -Lantus 22 units qhs , metformin 500 BID , and Glimepiride 2 mg qd \par last Ophth 4/2023 - [facility] \par \par hx Anemia - onset in 2020. Saw gastro did colonoscope -3/2/2020 and EGD - found to have large submucosal mass. 6/1- FNA was consistent with a GIST- s/p robotic laparoscopic possible open partial gastrectomy 7/10/2020\par -Disease: GIST \par TNM stage: T3, Nx, M0 \par AJCC Stage: Ib \par s/p EGD July 2021- joann - gets EGD annually \par - follows with oncology, no chemo/radiation advised at this time\par - anemia improving since surgery, continues iron supplements\par - denies abdominal pain, heartburn, diarrhea, constipation\par - f/u CT abd scan 7/2022 -IMPRESSION: Postsurgical changes without CT evidence of locally recurrent or metastatic disease.\par - will be getting scans q 1 yr now \par \par Hyperlipidemia- on atorvastatin 40 denies myalgias.\par \par Hypertension- compliant with meds (enalapril, amlodipine), off HCTZ 3/2018 d/t hypercalcemia. Checks BP at home, generally in the 130s, high of 141. \par \par H/o hypercalcemia- monitoring calcium intake, stopped Po ca supplements last ca was normal \par - off HCTZ \par

## 2023-05-15 NOTE — HEALTH RISK ASSESSMENT
[Good] : ~his/her~  mood as  good [No] : In the past 12 months have you used drugs other than those required for medical reasons? No [No falls in past year] : Patient reported no falls in the past year [0] : 2) Feeling down, depressed, or hopeless: Not at all (0) [I have developed a follow-up plan documented below in the note.] : I have developed a follow-up plan documented below in the note. [With Significant Other] : lives with significant other [Retired] : retired [] :  [Fully functional (bathing, dressing, toileting, transferring, walking, feeding)] : Fully functional (bathing, dressing, toileting, transferring, walking, feeding) [Fully functional (using the telephone, shopping, preparing meals, housekeeping, doing laundry, using] : Fully functional and needs no help or supervision to perform IADLs (using the telephone, shopping, preparing meals, housekeeping, doing laundry, using transportation, managing medications and managing finances) [With Patient/Caregiver] : , with patient/caregiver [Designated Healthcare Proxy] : Designated healthcare proxy [Name: ___] : Health Care Proxy's Name: [unfilled]  [Relationship: ___] : Relationship: [unfilled] [Never] : Never [de-identified] : sedentary shopping , walking around home  [[id number]] : 0 [Reports changes in hearing] : Reports no changes in hearing [Reports changes in vision] : Reports no changes in vision [Reports changes in dental health] : Reports no changes in dental health [AdvancecareDate] : 5/15/23

## 2023-05-15 NOTE — ASSESSMENT
[FreeTextEntry1] : Hyper pigmented spots on face - forehead neck \par -rx hydroquinone cream given very sparingly \par \par Diabetes type 2\par - on insulin and oral hypoglycemic, glimepiride dose decreased 2 mg after last visit, A1C 6.2 5/2022 --> poc 2/17/23 6.6 went up \par + microalbuminuria- jardiance 10 qgd-not covered by insurance - very high co pay \par - diet low in carbs, continue current medications. Monitor Accu-Cheks daily \par - ophthalmology every 6 months last 4//23- no retinopathy - records requested \par - encouraged to increase physical activity \par \par hx Submucosal gastric mass GIST s/p robotic laparoscopic partial gastrectomy 7/10/2020 \par -followed by Surgeon and Oncologist , and gastro \par TNM stage: T3, Nx, M0 \par AJCC Stage: Ib \par - surveillance scan q 6 months, did CT abd pelvis Jan 2022-IMPRESSION:\par The patient is again noted to be status post partial gastrectomy.\par Nonspecific enhancing focus measuring 3 to 4 mm along the inferolateral gastric body wall unchanged.\par No lymphadenopathy appreciated.\par - s/ EGD 6 /2021 \par - follows with oncology, no chemo/radiation advised at this time\par - anemia improving since surgery, continues iron supplements\par - denies abdominal pain, heartburn, diarrhea, constipation\par -- f/u CT abd scan 7/2022 -IMPRESSION: Postsurgical changes without CT evidence of locally recurrent or metastatic disease.\par - will be getting scans q 1 yr now \par \par Iron deficiency anemia:cbc 1/2022 nl \par -Improving since GI surgery, continues iron supplements\par -Labs today \par \par Hypertension:138/68 confirmed , check BP next visit \par --no cp sob palpitation or dizzy spells , no leg swelling \par - educated low salt diet , avoid canned , processed and fast food ,chips , bagged items , start exercise daily 30- 40 minutes , loose weight \par - on enalapril 20 po bid, amlodipine 10 mg daily- pt ot log readings at home call if SBP > 140 \par - HCTZ was discontinued d/t hypercalcemia\par - cardio consult reviewed - stress test negative\par - regular physical activity encouraged\par \par Proteinuria:- will check with insurance add GLP2 inhibitor ( very high copay ) \par -good DM and BP control\par -recheck urine today\par \par h/o hep c s/p interferon rx , RNA negative - AFP neg \par \par Hyperlipidemia\par -continues statin, working on diet and exercise \par \par HM\par mammo 5/2022 - bi rad 2 -ordered \par pap-> 6 yrs advised - gyn advised \par colonoscope -3/2020 \par UTD dental\par skin screening 1/12/23 \par bone density 4/2021- osteopenia-ordered \par  tdap- 5/2013 \par UTD pneumococcal vaccines- prevnar 2015, pneumovax 2016 \par Flu shot- 2/17/23 \par Completed COVID vaccine series- Moderna-3/13/21, 4/10/21 booster was 11/27/2021 \par ACP discussed, information provided, encouraged to review with spouse. \par Shingrex advised will get at pharmacy

## 2023-05-17 LAB
ALBUMIN SERPL ELPH-MCNC: 4.8 G/DL
ALP BLD-CCNC: 59 U/L
ALT SERPL-CCNC: 24 U/L
ANION GAP SERPL CALC-SCNC: 22 MMOL/L
APPEARANCE: CLEAR
AST SERPL-CCNC: 25 U/L
BASOPHILS # BLD AUTO: 0.04 K/UL
BASOPHILS NFR BLD AUTO: 0.7 %
BILIRUB SERPL-MCNC: 0.2 MG/DL
BILIRUBIN URINE: NEGATIVE
BLOOD URINE: NEGATIVE
BUN SERPL-MCNC: 11 MG/DL
CALCIUM SERPL-MCNC: 10.3 MG/DL
CHLORIDE SERPL-SCNC: 104 MMOL/L
CHOLEST SERPL-MCNC: 173 MG/DL
CO2 SERPL-SCNC: 18 MMOL/L
COLOR: YELLOW
CREAT SERPL-MCNC: 0.74 MG/DL
CREAT SPEC-SCNC: 112 MG/DL
EGFR: 83 ML/MIN/1.73M2
EOSINOPHIL # BLD AUTO: 0.13 K/UL
EOSINOPHIL NFR BLD AUTO: 2.4 %
ESTIMATED AVERAGE GLUCOSE: 146 MG/DL
FERRITIN SERPL-MCNC: 9 NG/ML
FOLATE SERPL-MCNC: 11 NG/ML
GLUCOSE QUALITATIVE U: NEGATIVE MG/DL
GLUCOSE SERPL-MCNC: 99 MG/DL
HBA1C MFR BLD HPLC: 6.7 %
HCT VFR BLD CALC: 39.2 %
HDLC SERPL-MCNC: 33 MG/DL
HGB BLD-MCNC: 12.4 G/DL
IMM GRANULOCYTES NFR BLD AUTO: 0.2 %
KETONES URINE: NEGATIVE MG/DL
LDLC SERPL CALC-MCNC: 100 MG/DL
LEUKOCYTE ESTERASE URINE: ABNORMAL
LYMPHOCYTES # BLD AUTO: 1.84 K/UL
LYMPHOCYTES NFR BLD AUTO: 33.4 %
MAN DIFF?: NORMAL
MCHC RBC-ENTMCNC: 24.8 PG
MCHC RBC-ENTMCNC: 31.6 GM/DL
MCV RBC AUTO: 78.6 FL
MICROALBUMIN 24H UR DL<=1MG/L-MCNC: 3.5 MG/DL
MICROALBUMIN/CREAT 24H UR-RTO: 32 MG/G
MONOCYTES # BLD AUTO: 0.57 K/UL
MONOCYTES NFR BLD AUTO: 10.3 %
NEUTROPHILS # BLD AUTO: 2.92 K/UL
NEUTROPHILS NFR BLD AUTO: 53 %
NITRITE URINE: NEGATIVE
NONHDLC SERPL-MCNC: 140 MG/DL
PH URINE: 6.5
PLATELET # BLD AUTO: 364 K/UL
POTASSIUM SERPL-SCNC: 5 MMOL/L
PROT SERPL-MCNC: 8.1 G/DL
PROTEIN URINE: NEGATIVE MG/DL
RBC # BLD: 4.99 M/UL
RBC # FLD: 17.2 %
SODIUM SERPL-SCNC: 143 MMOL/L
SPECIFIC GRAVITY URINE: 1.01
TRIGL SERPL-MCNC: 202 MG/DL
TSH SERPL-ACNC: 1.85 UIU/ML
UROBILINOGEN URINE: 1 MG/DL
VIT B12 SERPL-MCNC: 1118 PG/ML
WBC # FLD AUTO: 5.51 K/UL

## 2023-05-17 RX ORDER — HYDROQUINONE 40 MG/G
4 CREAM TOPICAL TWICE DAILY
Qty: 1 | Refills: 0 | Status: ACTIVE | COMMUNITY
Start: 2023-02-17 | End: 1900-01-01

## 2023-05-23 ENCOUNTER — NON-APPOINTMENT (OUTPATIENT)
Age: 78
End: 2023-05-23

## 2023-06-01 ENCOUNTER — APPOINTMENT (OUTPATIENT)
Dept: OBGYN | Facility: CLINIC | Age: 78
End: 2023-06-01
Payer: MEDICARE

## 2023-06-01 VITALS
BODY MASS INDEX: 27.49 KG/M2 | OXYGEN SATURATION: 98 % | HEIGHT: 64 IN | HEART RATE: 115 BPM | DIASTOLIC BLOOD PRESSURE: 77 MMHG | SYSTOLIC BLOOD PRESSURE: 173 MMHG | WEIGHT: 161 LBS

## 2023-06-01 DIAGNOSIS — Z01.419 ENCOUNTER FOR GYNECOLOGICAL EXAMINATION (GENERAL) (ROUTINE) W/OUT ABNORMAL FINDINGS: ICD-10-CM

## 2023-06-01 DIAGNOSIS — B97.7 PAPILLOMAVIRUS AS THE CAUSE OF DISEASES CLASSIFIED ELSEWHERE: ICD-10-CM

## 2023-06-01 PROCEDURE — G0101: CPT
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
## 2023-06-01 NOTE — PLAN
[FreeTextEntry1] : HCM\par -SBE\par -pap & HPV today (last pap 2017 wnl/hpv was positive)\par -Pt. has Rx for mammo/sono by PCP and has appointment tomorrow\par -MVI, Calcium, Vit d\par -Weight/exercise\par -Pt's BP was elevated today but she was just in to see PCP last week and states it has been normal and she is on medication.\par RTO 1 year\par

## 2023-06-02 ENCOUNTER — RESULT REVIEW (OUTPATIENT)
Age: 78
End: 2023-06-02

## 2023-06-02 ENCOUNTER — APPOINTMENT (OUTPATIENT)
Dept: ULTRASOUND IMAGING | Facility: CLINIC | Age: 78
End: 2023-06-02

## 2023-06-02 ENCOUNTER — APPOINTMENT (OUTPATIENT)
Dept: MAMMOGRAPHY | Facility: CLINIC | Age: 78
End: 2023-06-02
Payer: MEDICARE

## 2023-06-02 PROCEDURE — 77063 BREAST TOMOSYNTHESIS BI: CPT

## 2023-06-02 PROCEDURE — 77067 SCR MAMMO BI INCL CAD: CPT

## 2023-06-06 LAB
CYTOLOGY CVX/VAG DOC THIN PREP: ABNORMAL
HPV HIGH+LOW RISK DNA PNL CVX: NOT DETECTED

## 2023-06-20 ENCOUNTER — APPOINTMENT (OUTPATIENT)
Dept: GASTROENTEROLOGY | Facility: CLINIC | Age: 78
End: 2023-06-20
Payer: MEDICARE

## 2023-06-20 VITALS
DIASTOLIC BLOOD PRESSURE: 94 MMHG | WEIGHT: 159 LBS | HEART RATE: 128 BPM | BODY MASS INDEX: 24.96 KG/M2 | HEIGHT: 67 IN | SYSTOLIC BLOOD PRESSURE: 154 MMHG

## 2023-06-20 DIAGNOSIS — D3A.8 OTHER BENIGN NEUROENDOCRINE TUMORS: ICD-10-CM

## 2023-06-20 DIAGNOSIS — D50.9 IRON DEFICIENCY ANEMIA, UNSPECIFIED: ICD-10-CM

## 2023-06-20 DIAGNOSIS — K29.40 CHRONIC ATROPHIC GASTRITIS W/OUT BLEEDING: ICD-10-CM

## 2023-06-20 DIAGNOSIS — K22.70 BARRETT'S ESOPHAGUS W/OUT DYSPLASIA: ICD-10-CM

## 2023-06-20 PROCEDURE — 82274 ASSAY TEST FOR BLOOD FECAL: CPT | Mod: QW

## 2023-06-20 PROCEDURE — 99214 OFFICE O/P EST MOD 30 MIN: CPT | Mod: 25

## 2023-06-20 RX ORDER — KETOROLAC TROMETHAMINE 5 MG/ML
0.5 SOLUTION OPHTHALMIC
Qty: 5 | Refills: 0 | Status: DISCONTINUED | COMMUNITY
Start: 2020-01-20 | End: 2023-06-20

## 2023-06-20 RX ORDER — FLUTICASONE PROPIONATE 50 UG/1
50 SPRAY, METERED NASAL
Qty: 16 | Refills: 0 | Status: DISCONTINUED | COMMUNITY
Start: 2018-12-12 | End: 2023-06-20

## 2023-06-20 NOTE — PHYSICAL EXAM
[No Acute Distress] : no acute distress [Well Developed] : well developed [Well Nourished] : well nourished [None] : no edema [Bowel Sounds] : normal bowel sounds [Abdomen Tenderness] : non-tender [No Masses] : no abdominal mass palpated [Abdomen Soft] : soft [] : no hepatosplenomegaly [Umbilical] : umbilical [Normal Sphincter Tone] : normal sphincter tone [No External Hemorrhoid] : no external hemorrhoids [Inguinal Lymph Nodes Enlarged Bilaterally] : no inguinal lymphadenopathy [Normal] : oriented to person, place, and time [Occult Blood] : negative occult blood [FIT Test] : negative FIT test [de-identified] : surgical scars

## 2023-06-20 NOTE — ASSESSMENT
[FreeTextEntry1] : 1.  Status post partial gastrectomy for stage IB GIST July 2020.  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  History of colonic polyp, with diverticulosis, tortuous colon, and hemorrhoids at repeat colonoscopy March 2020.  Stool guaiac negative with negative fecal immunochemical test on today's exam.\par 3.  Low ferritin, history of anemia.  \par 4.  Type 2 diabetes mellitus.\par 5.  Hypertension.\par 6.  Hyperlipidemia.\par 7.  History of hepatitis C, eradicated.\par 8.  Status post left leg lengthening surgery.\par 9.  Status post iridectomy.\par \par Plan:\par 1.  Interim medical records reviewed.\par 2.  Schedule surveillance EGD--she was advised to have only liquids for dinner the preceding evening.  She would need to adjust diabetes medicines around the time of the test. Procedure, rationale, and anesthesia plan were reviewed and brochure given.\par 3.  Hold off on iron replacement therapy at this time.\par

## 2023-06-20 NOTE — HISTORY OF PRESENT ILLNESS
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  She has history of colonic polyp, with tortuous colon, diverticulosis, and hemorrhoids at last colonoscopy March 2020.  She denies GI symptoms at this time.  Last ferritin 9 with normal Hgb.

## 2023-06-20 NOTE — REASON FOR VISIT
[Follow-up] : a follow-up of an existing diagnosis [Spouse] : spouse [FreeTextEntry1] : routine follow up

## 2023-06-23 ENCOUNTER — RX RENEWAL (OUTPATIENT)
Age: 78
End: 2023-06-23

## 2023-06-27 ENCOUNTER — RESULT REVIEW (OUTPATIENT)
Age: 78
End: 2023-06-27

## 2023-06-28 ENCOUNTER — APPOINTMENT (OUTPATIENT)
Dept: RADIOLOGY | Facility: IMAGING CENTER | Age: 78
End: 2023-06-28
Payer: MEDICARE

## 2023-06-28 ENCOUNTER — OUTPATIENT (OUTPATIENT)
Dept: OUTPATIENT SERVICES | Facility: HOSPITAL | Age: 78
LOS: 1 days | End: 2023-06-28
Payer: MEDICARE

## 2023-06-28 DIAGNOSIS — Z98.49 CATARACT EXTRACTION STATUS, UNSPECIFIED EYE: Chronic | ICD-10-CM

## 2023-06-28 DIAGNOSIS — M85.80 OTHER SPECIFIED DISORDERS OF BONE DENSITY AND STRUCTURE, UNSPECIFIED SITE: ICD-10-CM

## 2023-06-28 DIAGNOSIS — Z98.890 OTHER SPECIFIED POSTPROCEDURAL STATES: Chronic | ICD-10-CM

## 2023-06-28 PROCEDURE — 77080 DXA BONE DENSITY AXIAL: CPT | Mod: 26

## 2023-06-28 PROCEDURE — 77080 DXA BONE DENSITY AXIAL: CPT

## 2023-07-01 ENCOUNTER — APPOINTMENT (OUTPATIENT)
Dept: CT IMAGING | Facility: IMAGING CENTER | Age: 78
End: 2023-07-01
Payer: MEDICARE

## 2023-07-01 ENCOUNTER — OUTPATIENT (OUTPATIENT)
Dept: OUTPATIENT SERVICES | Facility: HOSPITAL | Age: 78
LOS: 1 days | End: 2023-07-01
Payer: MEDICARE

## 2023-07-01 DIAGNOSIS — Z98.49 CATARACT EXTRACTION STATUS, UNSPECIFIED EYE: Chronic | ICD-10-CM

## 2023-07-01 DIAGNOSIS — C49.A0 GASTROINTESTINAL STROMAL TUMOR, UNSPECIFIED SITE: ICD-10-CM

## 2023-07-01 DIAGNOSIS — Z98.890 OTHER SPECIFIED POSTPROCEDURAL STATES: Chronic | ICD-10-CM

## 2023-07-01 PROCEDURE — 74177 CT ABD & PELVIS W/CONTRAST: CPT

## 2023-07-01 PROCEDURE — 74177 CT ABD & PELVIS W/CONTRAST: CPT | Mod: 26,MH

## 2023-07-03 RX ORDER — CHROMIUM 200 MCG
25 MCG TABLET ORAL
Qty: 90 | Refills: 3 | Status: ACTIVE | COMMUNITY
Start: 2023-07-03 | End: 1900-01-01

## 2023-07-09 ENCOUNTER — OUTPATIENT (OUTPATIENT)
Dept: OUTPATIENT SERVICES | Facility: HOSPITAL | Age: 78
LOS: 1 days | Discharge: HOME | End: 2023-07-09

## 2023-07-09 DIAGNOSIS — Z98.890 OTHER SPECIFIED POSTPROCEDURAL STATES: Chronic | ICD-10-CM

## 2023-07-09 DIAGNOSIS — C49.A0 GASTROINTESTINAL STROMAL TUMOR, UNSPECIFIED SITE: ICD-10-CM

## 2023-07-09 DIAGNOSIS — Z98.49 CATARACT EXTRACTION STATUS, UNSPECIFIED EYE: Chronic | ICD-10-CM

## 2023-07-10 ENCOUNTER — APPOINTMENT (OUTPATIENT)
Dept: HEMATOLOGY ONCOLOGY | Facility: CLINIC | Age: 78
End: 2023-07-10
Payer: MEDICARE

## 2023-07-10 VITALS
TEMPERATURE: 97.1 F | WEIGHT: 163.8 LBS | BODY MASS INDEX: 25.65 KG/M2 | RESPIRATION RATE: 16 BRPM | OXYGEN SATURATION: 97 % | HEART RATE: 127 BPM | DIASTOLIC BLOOD PRESSURE: 80 MMHG | SYSTOLIC BLOOD PRESSURE: 172 MMHG

## 2023-07-10 PROCEDURE — 99213 OFFICE O/P EST LOW 20 MIN: CPT

## 2023-07-10 NOTE — REVIEW OF SYSTEMS
[Fever] : no fever [Chills] : no chills [Fatigue] : no fatigue [Recent Change In Weight] : ~T no recent weight change [Eye Pain] : no eye pain [Vision Problems] : no vision problems [Dysphagia] : no dysphagia [Odynophagia] : no odynophagia [Chest Pain] : no chest pain [Palpitations] : no palpitations [Shortness Of Breath] : no shortness of breath [Cough] : no cough [Abdominal Pain] : no abdominal pain [Vomiting] : no vomiting [Joint Pain] : no joint pain [Muscle Pain] : no muscle pain [Confused] : no confusion [Dizziness] : no dizziness [Negative] : Psychiatric

## 2023-07-10 NOTE — HISTORY OF PRESENT ILLNESS
[Disease: _____________________] : Disease: [unfilled] [T: ___] : T[unfilled] [N: ___] : N[unfilled] [M: ___] : M[unfilled] [AJCC Stage: ____] : AJCC Stage: [unfilled] [de-identified] : 72 y/o F with PMH of hypertension, hyperlipidemia, type 2 diabetes, chronic hepatitis C, GERD and cataracts who in March 2020, noted to have issues with anemia, GERD and early satiety and work up included an EGD (a large submucosal mass without bleeding in the upper stomach. 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  A CT of the chest, abdomen and pelvis performed on 3/3/20 revealed a 6 cm cystic thick-walled mass which may be arising from the pancreatic tail and invaginating the lesser curvature of the stomach. Subsequent MRI/MRCP on 3/22/20 revealed a 7.3 cm submucosal lesion the proximal stomach with a large central cystic component, favoring a GIST. Doubt pancreatic lesion. No metastatic disease.  On 6/1/20 Dr. Wang Wynne performed an EUS which revealed a 7 cm subepithelial gastric lesion (location unspecified). 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  Patient presents to us to discuss oncologic care on 8/24/2020 and placed on surveillance \par \par PCP: Dr. Сергей Bui ([phone], [address]) \par \par  Patient has Stage IB GIST with intermediate recurrence risk based on NIH-Saravia     criteria (size 5-10cm and mitotic rate <5). [facility] nomogram shows 94%     recurrence-free survival at 2 years and 89% recurrence-free survival at 5 years. 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

## 2023-07-10 NOTE — PHYSICAL EXAM
[Fully active, able to carry on all pre-disease performance without restriction] : Status 0 - Fully active, able to carry on all pre-disease performance without restriction [Normal] : affect appropriate [de-identified] : anicteric  [de-identified] : neck supple, no JVD [de-identified] : normal respiratory effort, no audible wheeze [de-identified] : regular rate [de-identified] : non distended

## 2023-07-25 PROBLEM — C49.A0 GASTROINTESTINAL STROMAL TUMOR (GIST): Status: ACTIVE | Noted: 2020-06-15

## 2023-07-31 ENCOUNTER — APPOINTMENT (OUTPATIENT)
Dept: SURGICAL ONCOLOGY | Facility: CLINIC | Age: 78
End: 2023-07-31
Payer: MEDICARE

## 2023-07-31 VITALS
OXYGEN SATURATION: 98 % | BODY MASS INDEX: 25.43 KG/M2 | HEART RATE: 97 BPM | RESPIRATION RATE: 16 BRPM | WEIGHT: 162 LBS | SYSTOLIC BLOOD PRESSURE: 174 MMHG | HEIGHT: 67 IN | DIASTOLIC BLOOD PRESSURE: 83 MMHG

## 2023-07-31 DIAGNOSIS — C49.A0 GASTROINTESTINAL STOMACL TUMOR,UNSPECIFIED SITE: ICD-10-CM

## 2023-07-31 PROCEDURE — 99214 OFFICE O/P EST MOD 30 MIN: CPT

## 2023-07-31 NOTE — PHYSICAL EXAM
[Normal] : supple, no neck mass and thyroid not enlarged [Normal Supraclavicular Lymph Nodes] : normal supraclavicular lymph nodes [Normal Axillary Lymph Nodes] : normal axillary lymph nodes [Normal] : oriented to person, place and time, with appropriate affect [de-identified] : small incisional hernia at the umbilical incision.

## 2023-07-31 NOTE — HISTORY OF PRESENT ILLNESS
[de-identified] : Jocelynn is a pleasant 77 year-old female who returns for a 6 month follow up.    Her past medical history includes hypertension, hyperlipidemia, type 2 diabetes, chronic hepatitis C, GERD and cataracts.    On 6/1/20 Dr. Wang Wynne performed an EUS which revealed a 7 cm subepithelial gastric lesion (location unspecified). 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  She was referred to myself as well as Dr. Obie Santos from medical oncology for further management.   She is now status post robotic assisted partial gastrectomy on 7/10/20.  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   She consulted with Dr. Obie Santos from medical oncology on 8/24/20.  Given  intermediate recurrence risk based on NIH Saravia criteria, adjuvant therapy with imatinib was not recommended and she will be kept on surveillance.  She had a surveillance endoscopy with Dr. Ike Pabon on 6/21/21 which demonstrated no evidence of gross recurrence and a single fundic polyp.  Biopsy of the gastric fundus/body demonstrated a 2 mm well differentiated neuroendocrine tumor (Ki-67 index < 2%), negative for dysplasia.   CT completed 1/3/2022 notes no evidence of recurrence.  There is a 3 to 4 mm arterial enhancing focus within the gastric body indeterminant in nature.  A liver cyst and umbilical hernia is noted.  CT abdomen/pelvis performed on 7/7/22 showed postop changes without CT evidence of locally recurrent or metastatic disease.  7/25/22- She is eating well and denies nausea/vomiting, moving her bowels without difficulty and denies abdominal pain, fever, chills or weight loss. Her only complaint is that drinking coffee has been bothering her stomach lately.   1/30/23- Jocelynn returns for a 6 month follow up visit.  She denies any interval changes to her health.  She is doing well.  She does note a periumbilical incisional hernia.  Surveillance CT A/P 7/1/23- Stable exam. Status post partial gastrectomy with a stable nonspecific enhancing focus along the lateral gastric body wall.  7/31/23- Jocelynn is doing well and continues to deny any symptoms related to her periumbilical hernia.  She is eating well, weight is stable, denies any GI or constitutional symptoms.  She is scheduled for an EGD on 9/11/23 and will repeat a CT abdomen/pelvis in one year per Dr. Santos.

## 2023-07-31 NOTE — CONSULT LETTER
[Dear  ___] : Dear  [unfilled], [Consult Letter:] : I had the pleasure of evaluating your patient, [unfilled]. [Please see my note below.] : Please see my note below. [Consult Closing:] : Thank you very much for allowing me to participate in the care of this patient.  If you have any questions, please do not hesitate to contact me. [Sincerely,] : Sincerely, [DrHarry  ___] : Dr. LOZANO [DrHarry ___] : Dr. LOZANO [FreeTextEntry2] : Ike Pabon MD  [FreeTextEntry3] : Aj Aiken MD\par Surgical Oncology\par [facility]\par Office: [phone]\par Cell: [phone]\par

## 2023-07-31 NOTE — ASSESSMENT
[FreeTextEntry1] : Jocelynn is doing well.  She will repeat CT in 1 year and follow-up with us at that time.  She will continue her follow-up with Dr. Santos as well.

## 2023-08-07 ENCOUNTER — RX RENEWAL (OUTPATIENT)
Age: 78
End: 2023-08-07

## 2023-08-18 ENCOUNTER — APPOINTMENT (OUTPATIENT)
Dept: INTERNAL MEDICINE | Facility: CLINIC | Age: 78
End: 2023-08-18
Payer: MEDICARE

## 2023-08-18 ENCOUNTER — NON-APPOINTMENT (OUTPATIENT)
Age: 78
End: 2023-08-18

## 2023-08-18 VITALS
SYSTOLIC BLOOD PRESSURE: 168 MMHG | DIASTOLIC BLOOD PRESSURE: 73 MMHG | OXYGEN SATURATION: 98 % | BODY MASS INDEX: 25.11 KG/M2 | WEIGHT: 160 LBS | HEART RATE: 101 BPM | HEIGHT: 67 IN | TEMPERATURE: 98.4 F

## 2023-08-18 DIAGNOSIS — R80.9 PROTEINURIA, UNSPECIFIED: ICD-10-CM

## 2023-08-18 PROCEDURE — 99214 OFFICE O/P EST MOD 30 MIN: CPT | Mod: 25

## 2023-08-18 PROCEDURE — 36415 COLL VENOUS BLD VENIPUNCTURE: CPT

## 2023-08-18 PROCEDURE — 93000 ELECTROCARDIOGRAM COMPLETE: CPT

## 2023-08-18 RX ORDER — LOSARTAN POTASSIUM 50 MG/1
50 TABLET, FILM COATED ORAL DAILY
Qty: 90 | Refills: 0 | Status: DISCONTINUED | COMMUNITY
Start: 2023-08-18 | End: 2023-08-18

## 2023-08-18 NOTE — ASSESSMENT
[FreeTextEntry1] : Hypertension:160/68 confirmed , start atenolol 25 daily -- continue on enalapril 20 po bid, amlodipine 10 mg daily- pt ot log readings at home call if SBP > 140  -ekg-NSR at 90 Bpm  cardiology evaluation  --no cp sob palpitation or dizzy spells , no leg swelling  - educated low salt diet , avoid canned , processed and fast food ,chips , bagged items , start exercise daily 30- 40 minutes , loose weight  - HCTZ was discontinued d/t hypercalcemia - regular physical activity encouraged -check BP next visit   Diabetes type 2-AIC 6.7 5/2023 -get AIC  - on insulin and oral hypoglycemic, glimepiride dose decreased 2 mg after, A1C 6.2 5/2022  + microalbuminuria- jardiance 10 qgd-not covered by insurance - very high co pay  - diet low in carbs, continue current medications. Monitor Accu-Cheks daily  - ophthalmology every 6 months last 4//23- no retinopathy - records requested  - encouraged to increase physical activity   Hyperlipidemia-ASCVD risk 36 % on atorva 40 qd get lipids , cmp  LDL- 100 5/2023  -continues statin, working on diet and exercise   hx Submucosal gastric mass GIST s/p robotic laparoscopic partial gastrectomy 7/10/2020  -followed by Surgeon and Oncologist , and gastro  TNM stage: T3, Nx, M0  AJCC Stage: Ib  - surveillance scan q 6 months, did CT abd pelvis Jan 2022-IMPRESSION: The patient is again noted to be status post partial gastrectomy. Nonspecific enhancing focus measuring 3 to 4 mm along the inferolateral gastric body wall unchanged. No lymphadenopathy appreciated. - s/ EGD 6 /2021  - follows with oncology, no chemo/radiation advised at this time - anemia improving since surgery, continues iron supplements - denies abdominal pain, heartburn, diarrhea, constipation -- f/u CT abd scan 7/2022 -IMPRESSION: Postsurgical changes without CT evidence of locally recurrent or metastatic disease. - will be getting scans q 1 yr now   Hyper pigmented spots on face - forehead neck  -rx hydroquinone cream given very sparingly   hx Iron deficiency anemia:cbc 1/2022 nl  -Improving since GI surgery, continues iron supplements -Labs today   Proteinuria:- will check with insurance add GLP2 inhibitor ( very high copay )  -good DM and BP control -recheck urine today  h/o hep c s/p interferon rx , RNA negative - AFP neg   HM mammo 6/2023 - bi rad 1  pap->6/1/23  colonoscope -3/2020  UTD dental skin screening 1/12/23  bone density 6/2023 - osteopenia  tdap- 5/2013  UTD pneumococcal vaccines- prevnar 2015, pneumovax 2016  Flu shot- 2/17/23  Completed COVID vaccine series- Moderna-3/13/21, 4/10/21 booster was 11/27/2021  ACP discussed, information provided, encouraged to review with spouse.  Martin advised will get at pharmacy

## 2023-08-18 NOTE — HISTORY OF PRESENT ILLNESS
[de-identified] : 77 y.o. female, PMHx anemia, GIST (s/p robotic laparoscopic possible open partial gastrectomy 7/10/2020, no chemo/xrt) seen today for f/u on ch medical issues  no complaints today   was dx with umbilical hernia- might need surgery- saw md recommended no surgery needed   DM - well controlled  - AIc 6.6 2/2023  - Compliant with diet and medications. -- 174 - 190 elevated readings (occasionally higher if eats sweets)  -Lantus 22 units qhs , metformin 500 BID , and Glimepiride 2 mg qd  last Ophth 4/2023 - [facility]   hx Anemia - onset in 2020. Saw gastro did colonoscope -3/2/2020 and EGD - found to have large submucosal mass. 6/1- FNA was consistent with a GIST- s/p robotic laparoscopic possible open partial gastrectomy 7/10/2020 -Disease: GIST  TNM stage: T3, Nx, M0  AJCC Stage: Ib  s/p EGD July 2021- joann - gets EGD annually  - follows with oncology, no chemo/radiation advised at this time - anemia improving since surgery, continues iron supplements - denies abdominal pain, heartburn, diarrhea, constipation - f/u CT abd scan 7/2022 -IMPRESSION: Postsurgical changes without CT evidence of locally recurrent or metastatic disease. - will be getting scans q 1 yr now   Hyperlipidemia- on atorvastatin 40 denies myalgias.  Hypertension- compliant with meds (enalapril, amlodipine), off HCTZ 3/2018 d/t hypercalcemia. Checks BP at home, generally in the 130s, high of 141.   H/o hypercalcemia- monitoring calcium intake, stopped Po ca supplements last ca was normal  - off HCTZ

## 2023-08-23 LAB
ALBUMIN SERPL ELPH-MCNC: 4.3 G/DL
ALP BLD-CCNC: 55 U/L
ALT SERPL-CCNC: 21 U/L
ANION GAP SERPL CALC-SCNC: 14 MMOL/L
AST SERPL-CCNC: 19 U/L
BILIRUB SERPL-MCNC: 0.3 MG/DL
BUN SERPL-MCNC: 11 MG/DL
CALCIUM SERPL-MCNC: 9.8 MG/DL
CHLORIDE SERPL-SCNC: 103 MMOL/L
CHOLEST SERPL-MCNC: 141 MG/DL
CO2 SERPL-SCNC: 24 MMOL/L
CREAT SERPL-MCNC: 0.72 MG/DL
EGFR: 86 ML/MIN/1.73M2
ESTIMATED AVERAGE GLUCOSE: 137 MG/DL
GLUCOSE SERPL-MCNC: 139 MG/DL
HBA1C MFR BLD HPLC: 6.4 %
HDLC SERPL-MCNC: 31 MG/DL
LDLC SERPL CALC-MCNC: 84 MG/DL
NONHDLC SERPL-MCNC: 110 MG/DL
POTASSIUM SERPL-SCNC: 4.7 MMOL/L
PROT SERPL-MCNC: 7.7 G/DL
SODIUM SERPL-SCNC: 142 MMOL/L
TRIGL SERPL-MCNC: 149 MG/DL

## 2023-08-30 ENCOUNTER — NON-APPOINTMENT (OUTPATIENT)
Age: 78
End: 2023-08-30

## 2023-09-01 ENCOUNTER — APPOINTMENT (OUTPATIENT)
Dept: CARDIOLOGY | Facility: CLINIC | Age: 78
End: 2023-09-01

## 2023-09-11 ENCOUNTER — RX RENEWAL (OUTPATIENT)
Age: 78
End: 2023-09-11

## 2023-09-11 ENCOUNTER — APPOINTMENT (OUTPATIENT)
Dept: GASTROENTEROLOGY | Facility: CLINIC | Age: 78
End: 2023-09-11
Payer: MEDICARE

## 2023-09-11 ENCOUNTER — LABORATORY RESULT (OUTPATIENT)
Age: 78
End: 2023-09-11

## 2023-09-11 ENCOUNTER — NON-APPOINTMENT (OUTPATIENT)
Age: 78
End: 2023-09-11

## 2023-09-11 PROCEDURE — 43239 EGD BIOPSY SINGLE/MULTIPLE: CPT

## 2023-09-20 ENCOUNTER — RX RENEWAL (OUTPATIENT)
Age: 78
End: 2023-09-20

## 2023-09-24 ENCOUNTER — NON-APPOINTMENT (OUTPATIENT)
Age: 78
End: 2023-09-24

## 2023-09-28 ENCOUNTER — NON-APPOINTMENT (OUTPATIENT)
Age: 78
End: 2023-09-28

## 2023-10-01 PROBLEM — K29.40 AUTOIMMUNE GASTRITIS: Status: ACTIVE | Noted: 2021-08-16

## 2023-10-04 ENCOUNTER — RESULT CHARGE (OUTPATIENT)
Age: 78
End: 2023-10-04

## 2023-10-05 ENCOUNTER — NON-APPOINTMENT (OUTPATIENT)
Age: 78
End: 2023-10-05

## 2023-10-05 ENCOUNTER — APPOINTMENT (OUTPATIENT)
Dept: CARDIOLOGY | Facility: CLINIC | Age: 78
End: 2023-10-05
Payer: MEDICARE

## 2023-10-05 VITALS
OXYGEN SATURATION: 96 % | HEART RATE: 69 BPM | RESPIRATION RATE: 16 BRPM | HEIGHT: 67 IN | WEIGHT: 160 LBS | DIASTOLIC BLOOD PRESSURE: 71 MMHG | SYSTOLIC BLOOD PRESSURE: 132 MMHG | BODY MASS INDEX: 25.11 KG/M2 | TEMPERATURE: 97.8 F

## 2023-10-05 VITALS
RESPIRATION RATE: 16 BRPM | TEMPERATURE: 97.8 F | BODY MASS INDEX: 25.11 KG/M2 | HEART RATE: 69 BPM | OXYGEN SATURATION: 96 % | WEIGHT: 160 LBS | HEIGHT: 67 IN

## 2023-10-05 PROCEDURE — 93000 ELECTROCARDIOGRAM COMPLETE: CPT

## 2023-10-05 PROCEDURE — 99203 OFFICE O/P NEW LOW 30 MIN: CPT

## 2023-10-10 ENCOUNTER — TRANSCRIPTION ENCOUNTER (OUTPATIENT)
Age: 78
End: 2023-10-10

## 2023-10-10 ENCOUNTER — APPOINTMENT (OUTPATIENT)
Dept: GASTROENTEROLOGY | Facility: HOSPITAL | Age: 78
End: 2023-10-10

## 2023-10-10 ENCOUNTER — OUTPATIENT (OUTPATIENT)
Dept: OUTPATIENT SERVICES | Facility: HOSPITAL | Age: 78
LOS: 1 days | Discharge: HOME | End: 2023-10-10
Payer: MEDICARE

## 2023-10-10 VITALS
SYSTOLIC BLOOD PRESSURE: 149 MMHG | HEART RATE: 79 BPM | HEIGHT: 66 IN | TEMPERATURE: 97 F | RESPIRATION RATE: 16 BRPM | DIASTOLIC BLOOD PRESSURE: 69 MMHG | OXYGEN SATURATION: 98 % | WEIGHT: 151.9 LBS

## 2023-10-10 VITALS
RESPIRATION RATE: 14 BRPM | OXYGEN SATURATION: 97 % | SYSTOLIC BLOOD PRESSURE: 110 MMHG | HEART RATE: 69 BPM | DIASTOLIC BLOOD PRESSURE: 59 MMHG

## 2023-10-10 DIAGNOSIS — Z98.890 OTHER SPECIFIED POSTPROCEDURAL STATES: Chronic | ICD-10-CM

## 2023-10-10 DIAGNOSIS — D3A.8 OTHER BENIGN NEUROENDOCRINE TUMORS: ICD-10-CM

## 2023-10-10 DIAGNOSIS — Z98.49 CATARACT EXTRACTION STATUS, UNSPECIFIED EYE: Chronic | ICD-10-CM

## 2023-10-10 PROCEDURE — 88341 IMHCHEM/IMCYTCHM EA ADD ANTB: CPT | Mod: 26

## 2023-10-10 PROCEDURE — 88342 IMHCHEM/IMCYTCHM 1ST ANTB: CPT | Mod: 26

## 2023-10-10 PROCEDURE — 88305 TISSUE EXAM BY PATHOLOGIST: CPT | Mod: 26

## 2023-10-10 PROCEDURE — 43259 EGD US EXAM DUODENUM/JEJUNUM: CPT | Mod: GC

## 2023-10-10 PROCEDURE — 43239 EGD BIOPSY SINGLE/MULTIPLE: CPT | Mod: 59,GC

## 2023-10-10 PROCEDURE — 43251 EGD REMOVE LESION SNARE: CPT | Mod: GC

## 2023-10-10 RX ORDER — SODIUM CHLORIDE 9 MG/ML
500 INJECTION, SOLUTION INTRAVENOUS
Refills: 0 | Status: DISCONTINUED | OUTPATIENT
Start: 2023-10-10 | End: 2023-10-24

## 2023-10-10 NOTE — ASU DISCHARGE PLAN (ADULT/PEDIATRIC) - NS MD DC FALL RISK RISK
For information on Fall & Injury Prevention, visit: [email or web address] OR  [email or web address] OR  https://www.cdc.gov/steadi/patient.html

## 2023-10-10 NOTE — ASU PREOP CHECKLIST - ALLERGY BAND ON
· Maintain on aspirin Plavix, and statin  · Recent MRI brain without acute infarct  · Continue home medications no known allergies

## 2023-10-10 NOTE — PRE PROCEDURE NOTE - PRE PROCEDURE EVALUATION
Attending Physician:   Dr. Wynne    Procedure: EGD/EUS    Indication for Procedure: Gastric subepithelial lesion    ________________________________________________________  PAST MEDICAL & SURGICAL HISTORY:  Hypertension      Hyperlipidemia      Diabetes mellitus  type 2, stable      Hepatitis C  resolved 2008      S/P cataract surgery      History of hip surgery  left        ALLERGIES:  No Known Allergies    HOME MEDICATIONS:  amLODIPine 5 mg oral tablet: 1 tab(s) orally once a day (at bedtime)  atorvastatin 40 mg oral tablet: 1 tab(s) orally once a day (at bedtime)  enalapril 20 mg oral tablet: 1 tab(s) orally 2 times a day  glimepiride 4 mg oral tablet: 1 tab(s) orally once a day  Lantus 100 units/mL subcutaneous solution: 22 unit(s) subcutaneous once a day (at bedtime)  metFORMIN 1000 mg oral tablet: 1 tab(s) orally 2 times a day  Vitamin B12 1000 mcg oral tablet: 1 tab(s) orally once a day  Vitamin D3 400 intl units (10 mcg) oral capsule: 1 cap(s) orally once a day  vitamin E 200 intl units oral tablet: 1 tab(s) orally once a day    AICD/PPM: [ ] yes   [x ] no    PERTINENT LAB DATA:                      PHYSICAL EXAMINATION:    Height (cm): 167.6  Weight (kg): 68.855  BMI (kg/m2): 24.5  BSA (m2): 1.78T(C): 36.2  HR: 79  BP: 149/69  RR: 16  SpO2: 98%    Constitutional: NAD  HEENT: PERRLA, EOMI,    Neck:  No JVD  Respiratory: CTAB/L  Cardiovascular: S1 and S2  Gastrointestinal: BS+, soft, NT/ND  Extremities: No peripheral edema  Neurological: A/O x 3, no focal deficits  Psychiatric: Normal mood, normal affect  Skin: No rashes    ASA Class: I [ ]  II [ ]  III [ x]  IV [ ]    COMMENTS:    The patient is a suitable candidate for the planned procedure unless box checked [ ]  No, explain:  Phone  used for interview  I explained the risks (bleeding, infection, perforation, pancreatitis, CV risk, anesthesia risk)/b/a with the patient. The patient agrees to proceed. Patient had opportunity to ask questions in preprocedure bay 4. All questions answered.

## 2023-10-22 ENCOUNTER — NON-APPOINTMENT (OUTPATIENT)
Age: 78
End: 2023-10-22

## 2023-11-13 ENCOUNTER — LABORATORY RESULT (OUTPATIENT)
Age: 78
End: 2023-11-13

## 2023-11-16 ENCOUNTER — RX RENEWAL (OUTPATIENT)
Age: 78
End: 2023-11-16

## 2023-11-20 ENCOUNTER — APPOINTMENT (OUTPATIENT)
Dept: INTERNAL MEDICINE | Facility: CLINIC | Age: 78
End: 2023-11-20
Payer: MEDICARE

## 2023-11-20 VITALS
BODY MASS INDEX: 25.22 KG/M2 | WEIGHT: 161 LBS | RESPIRATION RATE: 15 BRPM | DIASTOLIC BLOOD PRESSURE: 76 MMHG | SYSTOLIC BLOOD PRESSURE: 166 MMHG | OXYGEN SATURATION: 98 % | HEART RATE: 103 BPM

## 2023-11-20 VITALS — DIASTOLIC BLOOD PRESSURE: 70 MMHG | SYSTOLIC BLOOD PRESSURE: 138 MMHG

## 2023-11-20 DIAGNOSIS — L30.9 DERMATITIS, UNSPECIFIED: ICD-10-CM

## 2023-11-20 PROCEDURE — 99214 OFFICE O/P EST MOD 30 MIN: CPT | Mod: 25

## 2023-11-20 PROCEDURE — 83036 HEMOGLOBIN GLYCOSYLATED A1C: CPT | Mod: QW

## 2023-11-20 RX ORDER — BLOOD SUGAR DIAGNOSTIC
STRIP MISCELLANEOUS
Qty: 1 | Refills: 5 | Status: ACTIVE | COMMUNITY
Start: 2020-05-06 | End: 1900-01-01

## 2023-12-07 ENCOUNTER — APPOINTMENT (OUTPATIENT)
Dept: DERMATOLOGY | Facility: CLINIC | Age: 78
End: 2023-12-07

## 2024-01-15 ENCOUNTER — RX RENEWAL (OUTPATIENT)
Age: 79
End: 2024-01-15

## 2024-01-15 RX ORDER — AMLODIPINE BESYLATE 10 MG/1
10 TABLET ORAL
Qty: 90 | Refills: 2 | Status: ACTIVE | COMMUNITY
Start: 2019-07-03 | End: 1900-01-01

## 2024-01-16 ENCOUNTER — RX RENEWAL (OUTPATIENT)
Age: 79
End: 2024-01-16

## 2024-01-17 RX ORDER — EZETIMIBE 10 MG/1
10 TABLET ORAL DAILY
Qty: 90 | Refills: 1 | Status: ACTIVE | COMMUNITY
Start: 2023-10-05 | End: 1900-01-01

## 2024-01-25 ENCOUNTER — NON-APPOINTMENT (OUTPATIENT)
Age: 79
End: 2024-01-25

## 2024-01-30 ENCOUNTER — RX CHANGE (OUTPATIENT)
Age: 79
End: 2024-01-30

## 2024-02-09 ENCOUNTER — APPOINTMENT (OUTPATIENT)
Dept: INTERNAL MEDICINE | Facility: CLINIC | Age: 79
End: 2024-02-09
Payer: MEDICARE

## 2024-02-09 VITALS
HEART RATE: 61 BPM | HEIGHT: 67 IN | WEIGHT: 161 LBS | DIASTOLIC BLOOD PRESSURE: 70 MMHG | BODY MASS INDEX: 25.27 KG/M2 | SYSTOLIC BLOOD PRESSURE: 133 MMHG | OXYGEN SATURATION: 98 % | TEMPERATURE: 97.3 F

## 2024-02-09 DIAGNOSIS — L28.0 LICHEN SIMPLEX CHRONICUS: ICD-10-CM

## 2024-02-09 DIAGNOSIS — L81.1 CHLOASMA: ICD-10-CM

## 2024-02-09 DIAGNOSIS — B18.2 CHRONIC VIRAL HEPATITIS C: ICD-10-CM

## 2024-02-09 LAB — HBA1C MFR BLD HPLC: 6.5

## 2024-02-09 PROCEDURE — G2211 COMPLEX E/M VISIT ADD ON: CPT

## 2024-02-09 PROCEDURE — 83036 HEMOGLOBIN GLYCOSYLATED A1C: CPT | Mod: QW

## 2024-02-09 PROCEDURE — 99214 OFFICE O/P EST MOD 30 MIN: CPT

## 2024-02-09 NOTE — HISTORY OF PRESENT ILLNESS
[Spouse] : spouse [de-identified] : 78 y.o. female, PMHx anemia, GIST (s/p robotic laparoscopic possible open partial gastrectomy 7/10/2020, no chemo/xrt) seen today for f/u on ch medical issues  Hyperpigmented patches  on face saw derm 12 8/23 dx as Melasma rx with ILK 12/8/23 , cream Triluma qhs  dx Lichen Simplex Chronicus -  was dx with umbilical hernia- might need surgery- saw md recommended no surgery needed   DM - well controlled  - AIc 6.4 8/2023  - Compliant with diet and medications. -- 174 - 190 elevated readings (occasionally higher if eats sweets)  -Lantus 22 units qhs , metformin 500 BID , and Glimepiride 2 mg qd  last Ophth 4/2023 - [facility]   hx Anemia - onset in 2020. Saw gastro did colonoscope -3/2/2020 and EGD - found to have large submucosal mass. 6/1- FNA was consistent with a GIST- s/p robotic laparoscopic possible open partial gastrectomy 7/10/2020 -Disease: GIST  TNM stage: T3, Nx, M0  AJCC Stage: Ib  s/p EGD July 2021- joann - gets EGD annually  - follows with oncology, no chemo/radiation advised at this time - anemia improving since surgery, continues iron supplements - denies abdominal pain, heartburn, diarrhea, constipation - f/u CT abd scan 7/2022 -IMPRESSION: Postsurgical changes without CT evidence of locally recurrent or metastatic disease. - will be getting scans q 1 yr now   Hyperlipidemia- on atorvastatin 40 denies myalgias.  Hypertension- compliant with meds (enalapril, amlodipine), off HCTZ 3/2018 d/t hypercalcemia. Checks BP at home, generally in the 130s, high of 141.   H/o hypercalcemia- monitoring calcium intake, stopped Po ca supplements last ca was normal  - off HCTZ

## 2024-02-09 NOTE — ASSESSMENT
[FreeTextEntry1] : forehead discoloration --Hyperpigmented patches  on face saw new derm 12 8/23 dx as Melasma rx with ILK 12/8/23 , cream Triluma qhs  dx Lichen Simplex Chronicus -improved a lot very happy with results - has f/u 2/12/24  - since 1 yr followed by Derm - using cream cerevy etc hydroquinone 4% , triamcinolone cream last visits 1/2023 adv f/u   Hypertension:138/70 better  confirmed , cont atenolol 25 daily -- continue on enalapril 20 po bid, amlodipine 10 mg daily- pt to log readings at home call if SBP > 140 -ekg-NSR at 90 Bpm cardiology evaluation 10/5/23 reviewed - added ezitamibe 10 10/2023 did BW 11/2023 - LDL 68  has echo and stress test schedule 2/28/24  --no cp sob palpitation or dizzy spells , no leg swelling - educated low salt diet , avoid canned , processed and fast food ,chips , bagged items , start exercise daily 30- 40 minutes , loose weight - HCTZ was discontinued d/t hypercalcemia - regular physical activity encouraged -check BP next visit  Diabetes type 2-AIC 6.7 5/2023 -> 6.4 8/2023 --> POC AIC  6.5 stable 11/20/23 -POC AIC 6.5 stable 2/9/24  - on insulin and oral hypoglycemic, glimepiride dose decreased 2 mg after, A1C 6.2 5/2022 + microalbuminuria- jardiance 10 qgd-not covered by insurance - very high co pay - diet low in carbs, continue current medications. Monitor Accu-Cheks daily - ophthalmology every 6 months last 4//23- no retinopathy - records requested - encouraged to increase physical activity  Hyperlipidemia-ASCVD risk 36 % on atorva 40 qd get lipids , cmp LDL-68 11/2023  -continues statin, working on diet and exercise  hx Submucosal gastric mass GIST s/p robotic laparoscopic partial gastrectomy 7/10/2020 s/p EGD 10/22/23 - due for EUS 10/2024. -followed by Surgeon and Oncologist , and gastro TNM stage: T3, Nx, M0 AJCC Stage: Ib - surveillance scan q 6 months, did CT abd pelvis Jan 2022-IMPRESSION: The patient is again noted to be status post partial gastrectomy. Nonspecific enhancing focus measuring 3 to 4 mm along the inferolateral gastric body wall unchanged. No lymphadenopathy appreciated. - s/ EGD 6 /2021 - follows with oncology, no chemo/radiation advised at this time - anemia improving since surgery, continues iron supplements - denies abdominal pain, heartburn, diarrhea, constipation -- f/u CT abd scan 7/2022 -IMPRESSION: Postsurgical changes without CT evidence of locally recurrent or metastatic disease. -Ct abd pelvis 7/2023 -IMPRESSION: Stable exam. Status post partial gastrectomy with a stable nonspecific enhancing focus along the lateral gastric body wall. - will be getting scans q 1 yr now  hx Iron deficiency anemia:cbc 1/2022 nl -Improving since GI surgery, continues iron supplements -Labs today  Proteinuria:- will check with insurance add GLP2 inhibitor ( very high copay ) -good DM and BP control -recheck urine today  h/o hep c s/p interferon rx , RNA negative - AFP neg  HM mammo 6/2023 - bi rad 1 pap->6/1/23 colonoscope -3/2020 UTD dental skin screening 1/12/23 bone density 6/2023 - osteopenia  tdap- 5/2013 UTD pneumococcal vaccines- prevnar 2015, pneumovax 2016 Flu shot- 9/2023  Completed COVID vaccine series- Moderna-3/13/21, 4/10/21 booster was 11/27/2021 ACP discussed, information provided, encouraged to review with spouse. Shingrex 9/2023 , 6/2023

## 2024-02-28 ENCOUNTER — APPOINTMENT (OUTPATIENT)
Dept: CARDIOLOGY | Facility: CLINIC | Age: 79
End: 2024-02-28
Payer: MEDICARE

## 2024-02-28 VITALS
OXYGEN SATURATION: 98 % | HEART RATE: 66 BPM | RESPIRATION RATE: 16 BRPM | SYSTOLIC BLOOD PRESSURE: 130 MMHG | BODY MASS INDEX: 25.11 KG/M2 | HEIGHT: 67 IN | DIASTOLIC BLOOD PRESSURE: 78 MMHG | TEMPERATURE: 98 F | WEIGHT: 160 LBS

## 2024-02-28 PROCEDURE — 93306 TTE W/DOPPLER COMPLETE: CPT

## 2024-02-28 PROCEDURE — G2211 COMPLEX E/M VISIT ADD ON: CPT

## 2024-02-28 PROCEDURE — 99214 OFFICE O/P EST MOD 30 MIN: CPT | Mod: 25

## 2024-02-28 PROCEDURE — 93015 CV STRESS TEST SUPVJ I&R: CPT

## 2024-02-28 NOTE — PHYSICAL EXAM
[Well Developed] : well developed [Well Nourished] : well nourished [No Acute Distress] : no acute distress [Normal Venous Pressure] : normal venous pressure [Normal Conjunctiva] : normal conjunctiva [No Carotid Bruit] : no carotid bruit [Normal S1, S2] : normal S1, S2 [No Rub] : no rub [No Gallop] : no gallop [Normal] : normal [5th Left ICS - MCL] : palpated at the 5th LICS in the midclavicular line [No Precordial Heave] : no precordial heave was noted [Normal Rate] : normal [Normal S1] : normal S1 [Normal S2] : normal S2 [No Pitting Edema] : no pitting edema present [I] : a grade 1 [2+] : left 2+ [No Abnormalities] : the abdominal aorta was not enlarged and no bruit was heard [Clear Lung Fields] : clear lung fields [Good Air Entry] : good air entry [Non Tender] : non-tender [Soft] : abdomen soft [No Respiratory Distress] : no respiratory distress  [Normal Gait] : normal gait [Normal Bowel Sounds] : normal bowel sounds [No Masses/organomegaly] : no masses/organomegaly [No Cyanosis] : no cyanosis [No Edema] : no edema [No Clubbing] : no clubbing [No Varicosities] : no varicosities [Moves all extremities] : moves all extremities [No Skin Lesions] : no skin lesions [No Rash] : no rash [No Focal Deficits] : no focal deficits [Alert and Oriented] : alert and oriented [Normal Speech] : normal speech [Normal memory] : normal memory [Apical Thrill] : no thrill palpable at the apex [S3] : no S3 [S4] : no S4 [Right Carotid Bruit] : no bruit heard over the right carotid [Left Carotid Bruit] : no bruit heard over the left carotid [Left Femoral Bruit] : no bruit heard over the left femoral artery [Right Femoral Bruit] : no bruit heard over the right femoral artery

## 2024-02-28 NOTE — REASON FOR VISIT
[CV Risk Factors and Non-Cardiac Disease] : CV risk factors and non-cardiac disease [Hyperlipidemia] : hyperlipidemia [Hypertension] : hypertension [FreeTextEntry3] : Dr. Patrick Bui  [FreeTextEntry1] : 78-year-old female with pmhx anemia, Gastrointestinal Stromal Tumor (s/p robotic laparoscopic open partial gastrectomy July 2020; no chem/radiation), GERD, hypertension, hyperlipidemia, diabetes, umbilical hernia presents today for follow-up cardiac evaluation. She follows with both GI and Surgical-Oncology for follow-up related to her history of GIST, polyps, and Cheng's esophagus. No smoking history. No history of Rheumatic fever.  Family history significant for brother with hyperlipidemia.   Today she feels generally well and denies chest pain, palpitations, dizziness, shortness of breath, and syncopal episodes.  Patient may feel dyspnea on exertion at times.  Currently taking Atenolol 25 mg daily, Enalapril 20 mg BID, Amlodipine 10 mg daily, Atorvastatin 40 mg daily.   Exercise stress test done today, 2/28/24. Patient was only able to reach stage 1. Nuclear stress test recommended to patient.   BLOOD WORK: -Last done August 2023 with cholesterol 141, triglycerides 149, HDL 31, LDL 84, Non-, hemoglobin a1c 6.4%  - Blood work done May 2023 with cholesterol 173, triglycerides 202, HDL 33, , Non-  TESTING/REPORTS: -EKG done October 5, 2023 which demonstrated sinus rhythm heart rate 69 BPM with  - Echocardiogram done August 2017 with thickened mitral leaflets, minimal mitral regurgitation, calcified trileaflet aortic valve with normal opening, hyperdynamic left ventricle, EF estimated 75-80%  - Nuclear stress test done August 2017 with normal results

## 2024-02-28 NOTE — DISCUSSION/SUMMARY
[FreeTextEntry1] : This is a 78-year-old female with past medical history significant for hypertension, non-insulin-dependent diabetes mellitus, hyperlipidemia, status post hip fusion surgery GERD/dyspepsia, status post surgery for gastrointestinal stromal tumor with partial gastrectomy in July 2020, status post ventral hernia repair, who comes in for cardiac follow-up evaluation. She denies chest pain, shortness of breath, dizziness or syncope.  She was born in the [facility] [place] and has no history of rheumatic fever.  She does not drink excessive caffeine or alcohol. Cardiac risk factors include hypertension, hyperlipidemia and non-insulin-dependent diabetes mellitus. Exercise stress test done February 28, 2024 was normal at a low workload; the patient was only able to complete stage one of the Tadeo protocol. I recommend the patient schedule a pharmacologic nuclear stress test to rule out significant coronary artery disease. Electrocardiogram done October 5, 2023 demonstrated normal sinus rhythm rate 68 bpm is otherwise unremarkable. The patient will schedule an echo Doppler examination to evaluate her left ventricular function, chamber size, rule out mitral valve prolapse, and rule out hypertrophy. She will also need new blood work for lipid profile.  Her LDL target is 70 mg/dL. Lipid panel done November 13, 2023 demonstrated cholesterol 116, HDL 28, triglycerides 105, LDL 75, non-HDL cholesterol 88 mg/dL and lipoprotein B 78 mg/dL. Lifestyle modification was reinforced. The patient is currently taking enalapril 20 mg twice a day, atenolol 25 mg/day, and amlodipine 10 mg daily for hypertension.  She is taking atorvastatin 40 mg daily for her hyperlipidemia. Given the presence of diabetes, her LDL target is less than 70 mg/dL. Lipid panel done August 22, 2023 demonstrated cholesterol of 141, triglycerides 149, HDL 31, LDL calculated 84, non-HDL cholesterol 110 mg/dL.  Hemoglobin A1c of 6.4 I recommend the patient add Zetia 10 mg daily to her current medical regimen to achieve LDL target.  She will do blood work done in 6 to 8 weeks. Her blood pressure is under good control today and I will continue her current medical therapy.  She reports having a normal nuclear exercise stress test and echo Doppler examination in 2017. - Echocardiogram done August 2017 with thickened mitral leaflets, minimal mitral regurgitation, calcified trileaflet aortic valve with normal opening, hyperdynamic left ventricle, EF estimated 75-80%  - Nuclear stress test done August 2017 with normal results - Blood work done May 2023 with cholesterol 173, triglycerides 202, HDL 33, , Non- - Blood work done April 2022 with cholesterol 158, triglycerides 135, HDL 32, LDL 99, Non-  The patient is encouraged to increase her aerobic activity 40 minutes 4 times per week.  She also understands she must limit his salt intake. I would recommend she work with our registered dietitian to improve her diabetic state as well as a lipid profile she is not working with someone already. The patient understands that aerobic exercises must be increased to 40 minutes 4 times per week. A detailed discussion of lifestyle modification was done today. The patient has a good understanding of the diagnosis, and treatment plan. Lifestyle modification was also outlined.

## 2024-03-13 ENCOUNTER — APPOINTMENT (OUTPATIENT)
Dept: CARDIOLOGY | Facility: CLINIC | Age: 79
End: 2024-03-13
Payer: MEDICARE

## 2024-03-13 PROCEDURE — A9500: CPT

## 2024-03-13 PROCEDURE — 78452 HT MUSCLE IMAGE SPECT MULT: CPT

## 2024-03-13 PROCEDURE — 93015 CV STRESS TEST SUPVJ I&R: CPT

## 2024-04-14 ENCOUNTER — RX RENEWAL (OUTPATIENT)
Age: 79
End: 2024-04-14

## 2024-04-15 ENCOUNTER — RX RENEWAL (OUTPATIENT)
Age: 79
End: 2024-04-15

## 2024-04-15 RX ORDER — INSULIN GLARGINE 100 [IU]/ML
100 INJECTION, SOLUTION SUBCUTANEOUS
Qty: 3 | Refills: 2 | Status: ACTIVE | COMMUNITY
Start: 1900-01-01 | End: 1900-01-01

## 2024-04-15 RX ORDER — ATENOLOL 25 MG/1
25 TABLET ORAL
Qty: 90 | Refills: 1 | Status: ACTIVE | COMMUNITY
Start: 2023-08-18

## 2024-04-15 RX ORDER — METFORMIN HYDROCHLORIDE 500 MG/1
500 TABLET, COATED ORAL
Qty: 180 | Refills: 3 | Status: ACTIVE | COMMUNITY
Start: 2022-05-12 | End: 1900-01-01

## 2024-04-15 RX ORDER — CALCIUM CARBONATE/VITAMIN D3 600MG-5MCG
600-5 TABLET ORAL
Qty: 90 | Refills: 3 | Status: ACTIVE | COMMUNITY
Start: 2024-04-15 | End: 1900-01-01

## 2024-04-17 ENCOUNTER — LABORATORY RESULT (OUTPATIENT)
Age: 79
End: 2024-04-17

## 2024-04-18 ENCOUNTER — APPOINTMENT (OUTPATIENT)
Dept: CARDIOLOGY | Facility: CLINIC | Age: 79
End: 2024-04-18
Payer: MEDICARE

## 2024-04-18 VITALS
DIASTOLIC BLOOD PRESSURE: 78 MMHG | TEMPERATURE: 97.2 F | BODY MASS INDEX: 25.11 KG/M2 | WEIGHT: 160 LBS | HEIGHT: 67 IN | RESPIRATION RATE: 16 BRPM | SYSTOLIC BLOOD PRESSURE: 118 MMHG | OXYGEN SATURATION: 96 % | HEART RATE: 84 BPM

## 2024-04-18 DIAGNOSIS — I34.0 NONRHEUMATIC MITRAL (VALVE) INSUFFICIENCY: ICD-10-CM

## 2024-04-18 DIAGNOSIS — I10 ESSENTIAL (PRIMARY) HYPERTENSION: ICD-10-CM

## 2024-04-18 DIAGNOSIS — E11.9 TYPE 2 DIABETES MELLITUS W/OUT COMPLICATIONS: ICD-10-CM

## 2024-04-18 DIAGNOSIS — I07.1 RHEUMATIC TRICUSPID INSUFFICIENCY: ICD-10-CM

## 2024-04-18 PROCEDURE — G2211 COMPLEX E/M VISIT ADD ON: CPT

## 2024-04-18 PROCEDURE — 99214 OFFICE O/P EST MOD 30 MIN: CPT

## 2024-04-18 NOTE — ASSESSMENT
[FreeTextEntry1] : This is a 78-year-old female with past medical history significant for hypertension, non-insulin-dependent diabetes mellitus, hyperlipidemia, status post hip fusion surgery GERD/dyspepsia, s/p surgery for gastrointestinal stromal tumor with partial gastrectomy in July 2020, status post ventral hernia repair, who comes in for cardiac follow-up evaluation.  She was born in the [facility] [place] and has no history of rheumatic fever.  She does not drink excessive caffeine or alcohol.  Cardiac risk factors include hypertension, hyperlipidemia and non-insulin-dependent diabetes mellitus.  HPI: She is here for follow-up evaluation after completing a pharmacological nuclear stress test done March 2024 which demonstrated mildly abnormal myocardial perfusion with small-sized, mild defects in the mid to distal inferior and apical interior walls that are reversible, doesn't normalize with prone imaging consistent with ischemia. She reports dyspnea on exertion when climbing stairs and mild discomfort at times down the whole left side of her body that lasts for a few seconds and subsides without intervention. I have recommended she complete a CT Angiogram to better evaluate for coronary artery disease in the setting of her symptoms.   Current Medications: Amlodipine 10 mg daily, Atenolol 25 mg daily, Atorvastatin 40 mg daily, Enalapril 20 mg BID, and Zetia 10 mg daily.    BLOOD PRESSURE: Controlled.    BLOOD WORK:  *LDL target goal < 70* -New blood work was done 04/18/2024 to evaluate lipid profile, CBC, BMP, hepatic function, A1C and TSH. -Lipid panel done November 13, 2023 demonstrated cholesterol 116, HDL 28, triglycerides 105, LDL 75, non-HDL cholesterol 88 mg/dL and lipoprotein B 78 mg/dL. -Lipid panel done August 22, 2023 demonstrated cholesterol of 141, triglycerides 149, HDL 31, LDL calculated 84, non-HDL cholesterol 110 mg/dL.  Hemoglobin A1c of 6.4   TESTING/REPORTS: -Pharmacologic nuclear stress test done March 2024 demonstrated mildly abnormal myocardial perfusion with small-sized, mild defects in the mid to distal inferior and apical interior walls that are reversible, doesn't normalize with prone imaging consistent with ischemia. Post-stress left ventricular EF was 78%.   -Exercise stress test done February 28, 2024 was normal at a low workload; the patient was only able to complete stage one of the Tadeo protocol.  -Echocardiogram done February 2024 demonstrated normal left ventricular systolic function EF 65%, mild mitral regurgitation, mild tricuspid regurgitation, minimal pulmonic regurgitation.  -Electrocardiogram done October 5, 2023 demonstrated normal sinus rhythm rate 68 bpm is otherwise unremarkable.  -Echocardiogram done August 2017 with thickened mitral leaflets, minimal mitral regurgitation, calcified trileaflet aortic valve with normal opening, hyperdynamic left ventricle, EF estimated 75-80%    PLAN: -She will schedule a CT Angiogram to evaluate for coronary artery disease.  -She will continue her current medications and contact the office if problems arise before next follow-up appointment. -She will follow-up with her PCP routinely.    I have discussed the plan of care with MELISSA OATES and she will follow up in 1-2 months. They are compliant with all of their medications.     The patient understands that aerobic exercises must be increased to 40 minutes 4 times/week and a detailed discussion of lifestyle modification was done today.   The patient has a good understanding of the diagnosis, treatment plan and lifestyle modification.   They will contact me at the office for any questions with their care or any changes in their health status.   The patient was discussed with supervision physician Dr. García Dobbs at the time of the visit and the plan of care will be carried out as noted above.     OSWALDO Tena NP

## 2024-04-18 NOTE — DISCUSSION/SUMMARY
[FreeTextEntry1] : Dr. Dobbs-(PRIOR VISIT and PMH WITH Dr. Dobbs): This is a 78-year-old female with past medical history significant for hypertension, non-insulin-dependent diabetes mellitus, hyperlipidemia, status post hip fusion surgery GERD/dyspepsia, status post surgery for gastrointestinal stromal tumor with partial gastrectomy in July 2020, status post ventral hernia repair, who comes in for cardiac follow-up evaluation. She denies chest pain, shortness of breath, dizziness or syncope.  She was born in the [facility] [place] and has no history of rheumatic fever.  She does not drink excessive caffeine or alcohol. Cardiac risk factors include hypertension, hyperlipidemia and non-insulin-dependent diabetes mellitus. Exercise stress test done February 28, 2024 was normal at a low workload; the patient was only able to complete stage one of the Tadeo protocol. I recommend the patient schedule a pharmacologic nuclear stress test to rule out significant coronary artery disease. Electrocardiogram done October 5, 2023 demonstrated normal sinus rhythm rate 68 bpm is otherwise unremarkable. The patient will schedule an echo Doppler examination to evaluate her left ventricular function, chamber size, rule out mitral valve prolapse, and rule out hypertrophy. She will also need new blood work for lipid profile.  Her LDL target is 70 mg/dL. Lipid panel done November 13, 2023 demonstrated cholesterol 116, HDL 28, triglycerides 105, LDL 75, non-HDL cholesterol 88 mg/dL and lipoprotein B 78 mg/dL. Lifestyle modification was reinforced. The patient is currently taking enalapril 20 mg twice a day, atenolol 25 mg/day, and amlodipine 10 mg daily for hypertension.  She is taking atorvastatin 40 mg daily for her hyperlipidemia. Given the presence of diabetes, her LDL target is less than 70 mg/dL. Lipid panel done August 22, 2023 demonstrated cholesterol of 141, triglycerides 149, HDL 31, LDL calculated 84, non-HDL cholesterol 110 mg/dL.  Hemoglobin A1c of 6.4 I recommend the patient add Zetia 10 mg daily to her current medical regimen to achieve LDL target.  She will do blood work done in 6 to 8 weeks. Her blood pressure is under good control today and I will continue her current medical therapy.  She reports having a normal nuclear exercise stress test and echo Doppler examination in 2017. - Echocardiogram done August 2017 with thickened mitral leaflets, minimal mitral regurgitation, calcified trileaflet aortic valve with normal opening, hyperdynamic left ventricle, EF estimated 75-80%  - Nuclear stress test done August 2017 with normal results - Blood work done May 2023 with cholesterol 173, triglycerides 202, HDL 33, , Non- - Blood work done April 2022 with cholesterol 158, triglycerides 135, HDL 32, LDL 99, Non-  The patient is encouraged to increase her aerobic activity 40 minutes 4 times per week.  She also understands she must limit his salt intake. I would recommend she work with our registered dietitian to improve her diabetic state as well as a lipid profile she is not working with someone already. The patient understands that aerobic exercises must be increased to 40 minutes 4 times per week. A detailed discussion of lifestyle modification was done today. The patient has a good understanding of the diagnosis, and treatment plan. Lifestyle modification was also outlined.

## 2024-04-18 NOTE — PHYSICAL EXAM
[Well Developed] : well developed [Well Nourished] : well nourished [No Acute Distress] : no acute distress [Normal Conjunctiva] : normal conjunctiva [Normal Venous Pressure] : normal venous pressure [No Carotid Bruit] : no carotid bruit [Normal S1, S2] : normal S1, S2 [No Rub] : no rub [No Gallop] : no gallop [5th Left ICS - MCL] : palpated at the 5th LICS in the midclavicular line [Normal] : normal [No Precordial Heave] : no precordial heave was noted [Normal Rate] : normal [Normal S1] : normal S1 [Normal S2] : normal S2 [I] : a grade 1 [No Pitting Edema] : no pitting edema present [2+] : left 2+ [No Abnormalities] : the abdominal aorta was not enlarged and no bruit was heard [Clear Lung Fields] : clear lung fields [Good Air Entry] : good air entry [No Respiratory Distress] : no respiratory distress  [Soft] : abdomen soft [Non Tender] : non-tender [No Masses/organomegaly] : no masses/organomegaly [Normal Bowel Sounds] : normal bowel sounds [Normal Gait] : normal gait [No Edema] : no edema [No Cyanosis] : no cyanosis [No Clubbing] : no clubbing [No Varicosities] : no varicosities [No Rash] : no rash [No Skin Lesions] : no skin lesions [Moves all extremities] : moves all extremities [No Focal Deficits] : no focal deficits [Normal Speech] : normal speech [Alert and Oriented] : alert and oriented [Normal memory] : normal memory [Apical Thrill] : no thrill palpable at the apex [S3] : no S3 [S4] : no S4 [Right Carotid Bruit] : no bruit heard over the right carotid [Left Carotid Bruit] : no bruit heard over the left carotid [Right Femoral Bruit] : no bruit heard over the right femoral artery [Left Femoral Bruit] : no bruit heard over the left femoral artery

## 2024-04-18 NOTE — REVIEW OF SYSTEMS
[Negative] : Heme/Lymph [SOB] : no shortness of breath [Dyspnea on exertion] : dyspnea during exertion [Chest Discomfort] : chest discomfort [Lower Ext Edema] : no extremity edema [Leg Claudication] : no intermittent leg claudication [Palpitations] : no palpitations [Orthopnea] : no orthopnea [PND] : no PND [Syncope] : no syncope

## 2024-05-08 NOTE — PROGRESS NOTE ADULT - ASSESSMENT
SLADE BANUELOS at [facility]    Drain/inject large joint/bursa  2011    Performed by SLADE BANUELOS at [facility]    Fluoroscopic guidance needle placement  2011    Performed by SLADE BANUELOS at [facility]    Fluoroscopic guidance needle placement  2011    Performed by SLADE BANUELOS at [facility]    74y Female s/p Gastrectomy, partial, robot-assisted      PLAN:  - Diet: NPO  - Activity:  Ambulate as tolerated  - Labs: CBC, BMP, MAG, PHOS  - Pain medication: Tylenol, Toradol, Dilaudid.  - TOV due at 4PM    Tadeo Stock MD  Blue team pager [phone]          ASSESSMENT AND PLAN:   Cathy Villaseñor is a 62 year old female who presents with     ASSESSMENT:   Encounter Diagnosis   Name Primary?    Viral URI with cough Yes       PLAN:   Negative Covid  Meds as below.  Risks, benefits, and side effects of medication explained and discussed.  Discussed likely viral etiology. Reviewed symptom relief measures with patient.   To f/u with PCP if sx do not resolve as anticipated.      Meds & Refills for this Visit:  Requested Prescriptions     Signed Prescriptions Disp Refills    Dextromethorphan-guaiFENesin  MG Oral Cap 30 capsule 0     Sig: Take 1 tablet by mouth every 6 (six) hours as needed.    fluticasone propionate 50 MCG/ACT Nasal Suspension 1 each 0     Si sprays by Each Nare route daily for 14 days.           Patient Instructions   -Cough tablets as prescribed     -Flonase as prescribed   -OTC Tylenol as packet insert If no allergies  -Soothing cough drops as packet insert   -Good handwashing, to prevent spread of virus    -Face mask helps prevent viral infections    Follow up in 3-5 days for worsening symptoms with clinic or PCP       The patient indicates understanding of these issues and agrees to the plan.    74y Female s/p Gastrectomy, partial, robot-assisted      PLAN:  - Diet: Regular Diet  - Activity:  Ambulate as tolerated  - Repeat CBC/ monitor H/H  - Labs: CBC, BMP, MAG, PHOS  - Pain medication: PCEA  - Remove PCEA tomorrow as per acute pain management care recs  - TOV due at 4PM      Tadeo Stock MD  Blue team pager [phone]

## 2024-05-14 RX ORDER — EVOLOCUMAB 140 MG/ML
140 INJECTION, SOLUTION SUBCUTANEOUS
Qty: 6 | Refills: 1 | Status: ACTIVE | COMMUNITY
Start: 2024-05-02

## 2024-05-16 NOTE — REASON FOR VISIT
You can access the FollowMyHealth Patient Portal offered by [facility] by registering at the following website: [email or web address]. By joining [facility]’s FollowMyHealth portal, you will also be able to view your health information using other applications (apps) compatible with our system.
[Annual Wellness Visit] : an annual wellness visit

## 2024-05-17 ENCOUNTER — APPOINTMENT (OUTPATIENT)
Dept: INTERNAL MEDICINE | Facility: CLINIC | Age: 79
End: 2024-05-17
Payer: MEDICARE

## 2024-05-17 VITALS
OXYGEN SATURATION: 97 % | BODY MASS INDEX: 24.8 KG/M2 | WEIGHT: 158 LBS | HEIGHT: 67 IN | DIASTOLIC BLOOD PRESSURE: 70 MMHG | SYSTOLIC BLOOD PRESSURE: 143 MMHG | HEART RATE: 72 BPM | TEMPERATURE: 97.8 F

## 2024-05-17 DIAGNOSIS — Z00.00 ENCOUNTER FOR GENERAL ADULT MEDICAL EXAMINATION W/OUT ABNORMAL FINDINGS: ICD-10-CM

## 2024-05-17 PROCEDURE — G0439: CPT

## 2024-05-17 PROCEDURE — 36415 COLL VENOUS BLD VENIPUNCTURE: CPT

## 2024-05-17 NOTE — HEALTH RISK ASSESSMENT
[Good] : ~his/her~ current health as good [No] : In the past 12 months have you used drugs other than those required for medical reasons? No [No falls in past year] : Patient reported no falls in the past year [0] : 2) Feeling down, depressed, or hopeless: Not at all (0) [PHQ-2 Negative - No further assessment needed] : PHQ-2 Negative - No further assessment needed [de-identified] : walking 4 x a week  [[id number]] : 0 [With Significant Other] : lives with significant other [Retired] : retired [] :  [Fully functional (bathing, dressing, toileting, transferring, walking, feeding)] : Fully functional (bathing, dressing, toileting, transferring, walking, feeding) [Fully functional (using the telephone, shopping, preparing meals, housekeeping, doing laundry, using] : Fully functional and needs no help or supervision to perform IADLs (using the telephone, shopping, preparing meals, housekeeping, doing laundry, using transportation, managing medications and managing finances) [Reports changes in hearing] : Reports no changes in hearing [Reports changes in vision] : Reports no changes in vision [Reports changes in dental health] : Reports no changes in dental health [de-identified] : 5/2024  [With Patient/Caregiver] : , with patient/caregiver [AdvancecareDate] : 5/17/24

## 2024-05-17 NOTE — REVIEW OF SYSTEMS
Zolpidem - take AS NEEDED. Try not to take every night. Mirtazapine - take each night. SLEEP WELL  Why can't I sleep? Multiple factors affect our sleep, including our schedule, environment, medications, things we consume. This means there are many things we can try to change to improve our sleep! Why is sleep so important? Sleep helps us function - it is linked to memory, learning, even fighting infection. Sleep is when we restore, heal, and grow. THINGS I CAN TRY  ROUTINE  Wind down. Create a pre-sleep routine that helps you relax. Try a bath, reading, or listening to music. If you Nap, do this before 5pm. Try to exercise early, at least 3 hours before bed. Set a sleep Schedule. Go to sleep at the same time each night, wake up at the same each morning. ENVIRONMENT  Adjust the Temperature We sleep best between 60-75? Take a hot bath 1.5-2 hours before bed. This helps regulate the body temperature to fall and stay asleep continuously. Light is signal to the brain to stay awake. Try blackout curtains or eye masks. Ask your doctor if you still have trouble sleeping, or have challenges like travelling, shift work or medications that might interfere with sleep. Regulate to find the right kind and level of Noise. Try a white noise machine, or a fan. If distracted by a sleeping bed-partner, moving to the couch or a spare bed for a couple of nights might be helpful. Use the bed for sleep and sexual relations only, not for reading, watching television, or working. Excessive time in bed disrupts sleep. DURING THE DAY  Light is signal to the brain to stay awake. So use this first thing in the morning, and get sunshine at lunch. Things we consume affect our sleep. Drink coffee only early in the day. Don't drink alcohol within three hours of sleep. Reduce nicotine use in the evening. Avoid fluids before bed to help reduce the number of times we wake up to urinate.    Eat light meals, and schedule dinner 4 - 5 hours before bedtime. A light snack before bedtime can help sleep, but a large meal may have the opposite effect. WHEN I WAKE UP AT NIGHT    Do not look at the clock. Obsessing over time will just make it more difficult to sleep. Go over song lyrics, a scripture, or a recipe in your mind while you are trying to fall asleep  If still awake after 15 - 20 minutes, go into another room, read or do a quiet activity using dim lighting until feeling very sleepy. (Don't watch television or use bright lights.)   If a specific worry is keeping one awake, thinking of the problem in terms of images rather than in words may allow a person to fall asleep more quickly and to wake up with less anxiety.     RESOURCES  Consider a subscription to the online program Ehsan:  [email or web address]  Podcast: \"Sleep with me\"   Cognitive behavioral therapy for insomnia: \"CBTi\" lee ann or find a counselor  Apple Watch Lee Ann: \"[employer]\" to help reduce nightmares [Negative] : Heme/Lymph

## 2024-05-17 NOTE — HISTORY OF PRESENT ILLNESS
[Spouse] : spouse [de-identified] : 78 y.o. female, PMHx anemia, GIST (s/p robotic laparoscopic possible open partial gastrectomy 7/10/2020, no chemo/xrt) seen today for AWV  Hyperpigmented patches  on face saw derm 12 8/23 dx as Melasma rx with ILK 12/8/23 , cream Triluma qhs  dx Lichen Simplex Chronicus -  was dx with umbilical hernia- might need surgery- saw md recommended no surgery needed   DM - well controlled  - AIc 6.4 8/2023  - Compliant with diet and medications. -- 174 - 190 elevated readings (occasionally higher if eats sweets)  -Lantus 22 units qhs , metformin 500 BID , and Glimepiride 2 mg qd  last Ophth 4/2023 - [facility]   hx Anemia - onset in 2020. Saw gastro did colonoscope -3/2/2020 and EGD - found to have large submucosal mass. 6/1- FNA was consistent with a GIST- s/p robotic laparoscopic possible open partial gastrectomy 7/10/2020 -Disease: GIST  TNM stage: T3, Nx, M0  AJCC Stage: Ib  s/p EGD July 2021- joann - gets EGD annually  - follows with oncology, no chemo/radiation advised at this time - anemia improving since surgery, continues iron supplements - denies abdominal pain, heartburn, diarrhea, constipation - f/u CT abd scan 7/2022 -IMPRESSION: Postsurgical changes without CT evidence of locally recurrent or metastatic disease. - will be getting scans q 1 yr now   Hyperlipidemia- on atorvastatin 40 denies myalgias.  Hypertension- compliant with meds (enalapril, amlodipine), off HCTZ 3/2018 d/t hypercalcemia. Checks BP at home, generally in the 130s, high of 141.   H/o hypercalcemia- monitoring calcium intake, stopped Po ca supplements last ca was normal  - off HCTZ

## 2024-05-20 LAB
25(OH)D3 SERPL-MCNC: 32.8 NG/ML
APPEARANCE: CLEAR
BACTERIA: NEGATIVE /HPF
BILIRUBIN URINE: NEGATIVE
BLOOD URINE: NEGATIVE
CAST: 0 /LPF
COLOR: YELLOW
CREAT SPEC-SCNC: 65 MG/DL
EPITHELIAL CELLS: 0 /HPF
GLUCOSE QUALITATIVE U: NEGATIVE MG/DL
HCT VFR BLD CALC: 37.2 %
HGB BLD-MCNC: 12.1 G/DL
KETONES URINE: NEGATIVE MG/DL
LEUKOCYTE ESTERASE URINE: ABNORMAL
MCHC RBC-ENTMCNC: 25 PG
MCHC RBC-ENTMCNC: 32.5 GM/DL
MCV RBC AUTO: 76.9 FL
MICROALBUMIN 24H UR DL<=1MG/L-MCNC: 2.3 MG/DL
MICROALBUMIN/CREAT 24H UR-RTO: 35 MG/G
MICROSCOPIC-UA: NORMAL
NITRITE URINE: NEGATIVE
PH URINE: 6.5
PLATELET # BLD AUTO: 321 K/UL
PROTEIN URINE: NEGATIVE MG/DL
RBC # BLD: 4.84 M/UL
RBC # FLD: 17.3 %
RED BLOOD CELLS URINE: 2 /HPF
SPECIFIC GRAVITY URINE: 1.01
UROBILINOGEN URINE: 0.2 MG/DL
VIT B12 SERPL-MCNC: 1657 PG/ML
WBC # FLD AUTO: 5.68 K/UL
WHITE BLOOD CELLS URINE: 6 /HPF

## 2024-05-28 ENCOUNTER — NON-APPOINTMENT (OUTPATIENT)
Age: 79
End: 2024-05-28

## 2024-05-30 ENCOUNTER — APPOINTMENT (OUTPATIENT)
Dept: CT IMAGING | Facility: CLINIC | Age: 79
End: 2024-05-30

## 2024-05-31 DIAGNOSIS — R94.39 ABNORMAL RESULT OF OTHER CARDIOVASCULAR FUNCTION STUDY: ICD-10-CM

## 2024-06-04 DIAGNOSIS — E78.00 PURE HYPERCHOLESTEROLEMIA, UNSPECIFIED: ICD-10-CM

## 2024-06-04 DIAGNOSIS — R06.09 OTHER FORMS OF DYSPNEA: ICD-10-CM

## 2024-06-04 DIAGNOSIS — E78.5 HYPERLIPIDEMIA, UNSPECIFIED: ICD-10-CM

## 2024-06-06 NOTE — RESULTS/DATA
[FreeTextEntry1] : EXAM: CT ABDOMEN ONLY OC IC   PROCEDURE DATE: 01/07/2021    INTERPRETATION: CLINICAL INFORMATION: Resected gastrointestinal stromal tumor. Evaluate for recurrence.  COMPARISON: CT 9/9/2020.  PROCEDURE: CT of the Abdomen was performed with intravenous contrast. Arterial and Portal Venous phases were acquired. Intravenous contrast: 90 ml Omnipaque 350. 10 ml discarded. Oral contrast: None. Sagittal and coronal reformats were performed.  FINDINGS: LOWER CHEST: Within normal limits.  LIVER: Hepatic cysts, unchanged. BILE DUCTS: Normal caliber. GALLBLADDER: Within normal limits. SPLEEN: Within normal limits. PANCREAS: Within normal limits. ADRENALS: Within normal limits. KIDNEYS/URETERS: Bilateral renal cysts.  VISUALIZED PORTIONS: BOWEL: Partial gastrectomy. No evidence of locally recurrent disease. PERITONEUM: No ascites. VESSELS: Atherosclerotic changes. RETROPERITONEUM/LYMPH NODES: No lymphadenopathy. ABDOMINAL WALL: Within normal limits. BONES: Degenerative changes.  IMPRESSION: No evidence of recurrent or metastatic disease in the abdomen.        ANAID REBOLLEDO MD; Attending Radiologist This document has been electronically signed. Jan 7 2021 4:35PM 
131

## 2024-06-11 ENCOUNTER — APPOINTMENT (OUTPATIENT)
Dept: CARDIOLOGY | Facility: CLINIC | Age: 79
End: 2024-06-11

## 2024-06-11 ENCOUNTER — OUTPATIENT (OUTPATIENT)
Dept: OUTPATIENT SERVICES | Facility: HOSPITAL | Age: 79
LOS: 1 days | End: 2024-06-11
Payer: MEDICARE

## 2024-06-11 DIAGNOSIS — R94.39 ABNORMAL RESULT OF OTHER CARDIOVASCULAR FUNCTION STUDY: ICD-10-CM

## 2024-06-11 DIAGNOSIS — Z98.49 CATARACT EXTRACTION STATUS, UNSPECIFIED EYE: Chronic | ICD-10-CM

## 2024-06-11 DIAGNOSIS — R06.09 OTHER FORMS OF DYSPNEA: ICD-10-CM

## 2024-06-11 DIAGNOSIS — Z98.890 OTHER SPECIFIED POSTPROCEDURAL STATES: Chronic | ICD-10-CM

## 2024-06-11 DIAGNOSIS — E78.5 HYPERLIPIDEMIA, UNSPECIFIED: ICD-10-CM

## 2024-06-11 PROCEDURE — 75574 CT ANGIO HRT W/3D IMAGE: CPT

## 2024-06-11 PROCEDURE — 75574 CT ANGIO HRT W/3D IMAGE: CPT | Mod: 26,MH

## 2024-06-24 ENCOUNTER — OUTPATIENT (OUTPATIENT)
Dept: OUTPATIENT SERVICES | Facility: HOSPITAL | Age: 79
LOS: 1 days | End: 2024-06-24
Payer: MEDICARE

## 2024-06-24 ENCOUNTER — NON-APPOINTMENT (OUTPATIENT)
Age: 79
End: 2024-06-24

## 2024-06-24 DIAGNOSIS — Z98.49 CATARACT EXTRACTION STATUS, UNSPECIFIED EYE: Chronic | ICD-10-CM

## 2024-06-24 PROCEDURE — 75580 N-INVAS EST C FFR SW ALY CTA: CPT

## 2024-06-25 DIAGNOSIS — D3A.8 OTHER BENIGN NEUROENDOCRINE TUMORS: ICD-10-CM

## 2024-06-26 ENCOUNTER — RESULT REVIEW (OUTPATIENT)
Age: 79
End: 2024-06-26

## 2024-06-27 ENCOUNTER — NON-APPOINTMENT (OUTPATIENT)
Age: 79
End: 2024-06-27

## 2024-06-28 DIAGNOSIS — I25.10 ATHEROSCLEROTIC HEART DISEASE OF NATIVE CORONARY ARTERY W/OUT ANGINA PECTORIS: ICD-10-CM

## 2024-06-29 ENCOUNTER — RX RENEWAL (OUTPATIENT)
Age: 79
End: 2024-06-29

## 2024-07-05 ENCOUNTER — APPOINTMENT (OUTPATIENT)
Dept: CT IMAGING | Facility: CLINIC | Age: 79
End: 2024-07-05
Payer: MEDICARE

## 2024-07-05 PROCEDURE — 74177 CT ABD & PELVIS W/CONTRAST: CPT

## 2024-07-08 DIAGNOSIS — I25.10 ATHEROSCLEROTIC HEART DISEASE OF NATIVE CORONARY ARTERY WITHOUT ANGINA PECTORIS: ICD-10-CM

## 2024-07-09 ENCOUNTER — OUTPATIENT (OUTPATIENT)
Dept: OUTPATIENT SERVICES | Facility: HOSPITAL | Age: 79
LOS: 1 days | Discharge: HOME | End: 2024-07-09

## 2024-07-09 DIAGNOSIS — Z98.890 OTHER SPECIFIED POSTPROCEDURAL STATES: Chronic | ICD-10-CM

## 2024-07-09 DIAGNOSIS — C49.A0 GASTROINTESTINAL STROMAL TUMOR, UNSPECIFIED SITE: ICD-10-CM

## 2024-07-10 ENCOUNTER — APPOINTMENT (OUTPATIENT)
Dept: HEMATOLOGY ONCOLOGY | Facility: CLINIC | Age: 79
End: 2024-07-10
Payer: MEDICARE

## 2024-07-10 VITALS
HEART RATE: 70 BPM | RESPIRATION RATE: 16 BRPM | HEIGHT: 66.26 IN | DIASTOLIC BLOOD PRESSURE: 76 MMHG | WEIGHT: 160.49 LBS | BODY MASS INDEX: 25.79 KG/M2 | SYSTOLIC BLOOD PRESSURE: 145 MMHG | OXYGEN SATURATION: 98 % | TEMPERATURE: 97 F

## 2024-07-10 DIAGNOSIS — C49.A0 GASTROINTESTINAL STOMACL TUMOR,UNSPECIFIED SITE: ICD-10-CM

## 2024-07-10 PROCEDURE — 99213 OFFICE O/P EST LOW 20 MIN: CPT

## 2024-07-10 PROCEDURE — G2211 COMPLEX E/M VISIT ADD ON: CPT

## 2024-07-11 ENCOUNTER — NON-APPOINTMENT (OUTPATIENT)
Age: 79
End: 2024-07-11

## 2024-07-17 ENCOUNTER — RESULT REVIEW (OUTPATIENT)
Age: 79
End: 2024-07-17

## 2024-07-18 ENCOUNTER — RESULT REVIEW (OUTPATIENT)
Age: 79
End: 2024-07-18

## 2024-07-18 ENCOUNTER — APPOINTMENT (OUTPATIENT)
Dept: CARDIOTHORACIC SURGERY | Facility: HOSPITAL | Age: 79
End: 2024-07-18

## 2024-07-18 PROBLEM — K21.9 GASTRO-ESOPHAGEAL REFLUX DISEASE WITHOUT ESOPHAGITIS: Chronic | Status: ACTIVE | Noted: 2024-07-16

## 2024-07-18 PROBLEM — C49.A0 GASTROINTESTINAL STROMAL TUMOR, UNSPECIFIED SITE: Chronic | Status: ACTIVE | Noted: 2024-07-16

## 2024-07-18 PROBLEM — Z87.19 PERSONAL HISTORY OF OTHER DISEASES OF THE DIGESTIVE SYSTEM: Chronic | Status: ACTIVE | Noted: 2024-07-16

## 2024-07-25 ENCOUNTER — TRANSCRIPTION ENCOUNTER (OUTPATIENT)
Age: 79
End: 2024-07-25

## 2024-07-25 NOTE — HISTORY OF PRESENT ILLNESS
[de-identified] : Ms. MELISSA OATES is a 78-year-old woman here for a follow-up visit for a GIST.   Her past medical history includes hypertension, hyperlipidemia, type 2 diabetes, chronic hepatitis C, GERD and cataracts.    On 6/1/20 Dr. Wang Wynne performed an EUS which revealed a 7 cm subepithelial gastric lesion (location unspecified). 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  She was referred to me as well as Dr. Obie Santos from medical oncology for further management.   **SURGERY** She is status post robotic assisted partial gastrectomy on 7/10/2020.  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   She consulted with Dr. Obie Santos from medical oncology on 8/24/20.  Given intermediate recurrence risk based on NIH Saravia criteria, adjuvant therapy with imatinib was not recommended and she will be kept on surveillance.  She had a surveillance endoscopy with Dr. Ike Pabon on 6/21/21 which demonstrated no evidence of gross recurrence and a single fundic polyp.  Biopsy of the gastric fundus/body demonstrated a 2 mm well differentiated neuroendocrine tumor (Ki-67 index < 2%), negative for dysplasia.   CT completed 1/3/2022 notes no evidence of recurrence.  There is a 3 to 4 mm arterial enhancing focus within the gastric body indeterminant in nature.  A liver cyst and umbilical hernia is noted.  CT abdomen/pelvis performed on 7/7/22 showed postop changes without CT evidence of locally recurrent or metastatic disease.  7/25/22- She is eating well and denies nausea/vomiting, moving her bowels without difficulty and denies abdominal pain, fever, chills or weight loss. Her only complaint is that drinking coffee has been bothering her stomach lately.   1/30/23- Melissa returns for a 6 month follow up visit.  She denies any interval changes to her health.  She is doing well.  She does note a periumbilical incisional hernia.  Surveillance CT A/P 7/1/23- Stable exam. Status post partial gastrectomy with a stable nonspecific enhancing focus along the lateral gastric body wall.  7/31/23- Melissa is doing well and continues to deny any symptoms related to her periumbilical hernia.  She is eating well, weight is stable, denies any GI or constitutional symptoms.  She is scheduled for an EGD on 9/11/23. Melissa is doing well.  She will repeat CT in 1 year and follow-up with us at that time.  She will continue her follow-up with Dr. Santos as well.  upper EGD (Dr. Steven Goldberg) 9/11/2023 - atrophic gastritis, bx - 3-5 mm sessile polyps to gastric fundus & body ** bx from fundus/body showed a 2.3 mm well-differentiated NET, G1  She was then referred for an EUS (Dr. Baires) given the incidental NET findings  EUS 10/10/2023 - gastric polyps, predominantly in the proximal stomach - hyperplastic polyp at area of prior surgical material at the cardia, bx done * path showed well differentiated NET, involving the gastric mucosa, Ki67 2%, told to repeat in 1 year   triple phase CT A/P 7/5/2024 - stable since 7/2023, no evidence of recurrence   8/1/2024 - Melissa returns for ongoing follow-up, she underwent a CABG x 3 last week and is recovering appropriately.

## 2024-07-25 NOTE — CONSULT LETTER
[Dear  ___] : Dear  [unfilled], [Consult Letter:] : I had the pleasure of evaluating your patient, [unfilled]. [Please see my note below.] : Please see my note below. [Consult Closing:] : Thank you very much for allowing me to participate in the care of this patient.  If you have any questions, please do not hesitate to contact me. [Sincerely,] : Sincerely, [FreeTextEntry2] : Ike Pabon MD  [FreeTextEntry3] : Aj Aiken MD\par  Surgical Oncology\par  [facility]\par  Office: [phone]\par  Cell: [phone]\par   [DrHarry  ___] : Dr. LOZANO [DrHarry ___] : Dr. LOZANO

## 2024-07-26 ENCOUNTER — APPOINTMENT (OUTPATIENT)
Dept: CARE COORDINATION | Facility: HOME HEALTH | Age: 79
End: 2024-07-26
Payer: MEDICARE

## 2024-07-26 ENCOUNTER — NON-APPOINTMENT (OUTPATIENT)
Age: 79
End: 2024-07-26

## 2024-07-26 VITALS
HEART RATE: 75 BPM | DIASTOLIC BLOOD PRESSURE: 74 MMHG | OXYGEN SATURATION: 98 % | WEIGHT: 161 LBS | SYSTOLIC BLOOD PRESSURE: 104 MMHG | BODY MASS INDEX: 25.78 KG/M2 | RESPIRATION RATE: 16 BRPM

## 2024-07-26 PROCEDURE — 99024 POSTOP FOLLOW-UP VISIT: CPT

## 2024-07-26 RX ORDER — SPIRONOLACTONE 25 MG/1
25 TABLET ORAL DAILY
Refills: 0 | Status: ACTIVE | COMMUNITY

## 2024-07-26 RX ORDER — AMIODARONE HYDROCHLORIDE 200 MG/1
200 TABLET ORAL DAILY
Qty: 30 | Refills: 0 | Status: ACTIVE | COMMUNITY

## 2024-07-26 RX ORDER — FUROSEMIDE 40 MG/1
40 TABLET ORAL DAILY
Refills: 0 | Status: ACTIVE | COMMUNITY

## 2024-07-26 RX ORDER — METOPROLOL TARTRATE 25 MG/1
25 TABLET, FILM COATED ORAL
Qty: 60 | Refills: 0 | Status: ACTIVE | COMMUNITY

## 2024-07-26 NOTE — ASSESSMENT
[FreeTextEntry1] : Pt recovering well at home s/p OHS. Reviewed all medications and dosages with pt understanding. Pt has all medications in home and is taking as prescribed. Pain controlled with current medication regimen. Discussed pt. with  and as per  pt. to take lasix 40mg QD instead of BID and Spironolactone 25mg instead of 50mg. Pt. made aware and verbalized understanding. Advised pt. to wear surgical bra for better support. pt. encouraged to keep good glycemic control and follow consistent carbohydrate diet. [facility] SOC 7/26

## 2024-07-26 NOTE — PLAN
[TextEntry] : Post Operative Care:  Pt advised of importance of daily weights. Pt instructed on how to use incentive spirometer every hour, demonstrated proper use. Pt encouraged to ambulate as much as tolerated, avoiding extreme temperatures outdoors. Also advised to cleanse incisions daily with mild soap and water and to avoid lotions, powders, ointments or creams near or on the incision. Low salt, low fat diet encouraged and discussed. Pt advised to avoid heavy lifting or straining.     [facility] team will continue to follow up with pt status.  NP/CCC roles explained with pt understanding, contact information provided. Pt agrees to call with any questions, issues or concerns.  Worsening symptoms reviewed with patient understanding.      FOLLOW UP APPOINTMENTS:  CTS:  8/13  CARDIOLOGIST:  7/30  PCP:  Pt encouraged to follow up within one month of discharge

## 2024-07-26 NOTE — PHYSICAL EXAM
[] : no respiratory distress [Exaggerated Use Of Accessory Muscles For Inspiration] : no accessory muscle use [Auscultation Breath Sounds / Voice Sounds] : lungs were clear to auscultation bilaterally [Heart Sounds] : normal S1 and S2 [Chest Visual Inspection Thoracic Asymmetry] : no chest asymmetry [Bowel Sounds] : normal bowel sounds [Oriented To Time, Place, And Person] : oriented to person, place, and time [FreeTextEntry2] : no LE edema present [FreeTextEntry1] : RLE SVG site lizandro and well approximated. No erythema or drainage  noted

## 2024-07-30 ENCOUNTER — APPOINTMENT (OUTPATIENT)
Dept: CARDIOLOGY | Facility: CLINIC | Age: 79
End: 2024-07-30
Payer: MEDICARE

## 2024-07-30 VITALS
BODY MASS INDEX: 24.91 KG/M2 | HEIGHT: 66 IN | HEART RATE: 68 BPM | DIASTOLIC BLOOD PRESSURE: 70 MMHG | WEIGHT: 155 LBS | TEMPERATURE: 97 F | OXYGEN SATURATION: 92 % | SYSTOLIC BLOOD PRESSURE: 107 MMHG | RESPIRATION RATE: 16 BRPM

## 2024-07-30 DIAGNOSIS — I34.0 NONRHEUMATIC MITRAL (VALVE) INSUFFICIENCY: ICD-10-CM

## 2024-07-30 DIAGNOSIS — I25.10 ATHEROSCLEROTIC HEART DISEASE OF NATIVE CORONARY ARTERY W/OUT ANGINA PECTORIS: ICD-10-CM

## 2024-07-30 DIAGNOSIS — I07.1 RHEUMATIC TRICUSPID INSUFFICIENCY: ICD-10-CM

## 2024-07-30 DIAGNOSIS — E78.00 PURE HYPERCHOLESTEROLEMIA, UNSPECIFIED: ICD-10-CM

## 2024-07-30 DIAGNOSIS — Z95.1 PRESENCE OF AORTOCORONARY BYPASS GRAFT: ICD-10-CM

## 2024-07-30 DIAGNOSIS — E78.5 HYPERLIPIDEMIA, UNSPECIFIED: ICD-10-CM

## 2024-07-30 DIAGNOSIS — E11.9 TYPE 2 DIABETES MELLITUS W/OUT COMPLICATIONS: ICD-10-CM

## 2024-07-30 DIAGNOSIS — I10 ESSENTIAL (PRIMARY) HYPERTENSION: ICD-10-CM

## 2024-07-30 DIAGNOSIS — Z13.31 ENCOUNTER FOR SCREENING FOR DEPRESSION: ICD-10-CM

## 2024-07-30 PROCEDURE — 99214 OFFICE O/P EST MOD 30 MIN: CPT

## 2024-07-30 PROCEDURE — G2211 COMPLEX E/M VISIT ADD ON: CPT

## 2024-07-30 NOTE — DISCUSSION/SUMMARY
[FreeTextEntry1] : Dr. Dobbs-(PRIOR VISIT and PMH WITH Dr. Dobbs): This is a 78-year-old female with past medical history significant for hypertension, non-insulin-dependent diabetes mellitus, hyperlipidemia, status post hip fusion surgery GERD/dyspepsia, status post surgery for gastrointestinal stromal tumor with partial gastrectomy in July 2020, status post ventral hernia repair, who comes in for cardiac follow-up evaluation. She denies chest pain, shortness of breath, dizziness or syncope.  She was [place] in the [place] and has no history of rheumatic fever.  She does not drink excessive caffeine or alcohol. Cardiac risk factors include hypertension, hyperlipidemia and non-insulin-dependent diabetes mellitus. Exercise stress test done February 28, 2024 was normal at a low workload; the patient was only able to complete stage one of the Tadeo protocol. I recommend the patient schedule a pharmacologic nuclear stress test to rule out significant coronary artery disease. Electrocardiogram done October 5, 2023 demonstrated normal sinus rhythm rate 68 bpm is otherwise unremarkable. The patient will schedule an echo Doppler examination to evaluate her left ventricular function, chamber size, rule out mitral valve prolapse, and rule out hypertrophy. She will also need new blood work for lipid profile.  Her LDL target is 70 mg/dL. Lipid panel done November 13, 2023 demonstrated cholesterol 116, HDL 28, triglycerides 105, LDL 75, non-HDL cholesterol 88 mg/dL and lipoprotein B 78 mg/dL. Lifestyle modification was reinforced. The patient is currently taking enalapril 20 mg twice a day, atenolol 25 mg/day, and amlodipine 10 mg daily for hypertension.  She is taking atorvastatin 40 mg daily for her hyperlipidemia. Given the presence of diabetes, her LDL target is less than 70 mg/dL. Lipid panel done August 22, 2023 demonstrated cholesterol of 141, triglycerides 149, HDL 31, LDL calculated 84, non-HDL cholesterol 110 mg/dL.  Hemoglobin A1c of 6.4 I recommend the patient add Zetia 10 mg daily to her current medical regimen to achieve LDL target.  She will do blood work done in 6 to 8 weeks. Her blood pressure is under good control today and I will continue her current medical therapy.  She reports having a normal nuclear exercise stress test and echo Doppler examination in 2017. - Echocardiogram done August 2017 with thickened mitral leaflets, minimal mitral regurgitation, calcified trileaflet aortic valve with normal opening, hyperdynamic left ventricle, EF estimated 75-80%  - Nuclear stress test done August 2017 with normal results - Blood work done May 2023 with cholesterol 173, triglycerides 202, HDL 33, , Non- - Blood work done April 2022 with cholesterol 158, triglycerides 135, HDL 32, LDL 99, Non-  The patient is encouraged to increase her aerobic activity 40 minutes 4 times per week.  She also understands she must limit his salt intake. I would recommend she work with our registered dietitian to improve her diabetic state as well as a lipid profile she is not working with someone already. The patient understands that aerobic exercises must be increased to 40 minutes 4 times per week. A detailed discussion of lifestyle modification was done today. The patient has a good understanding of the diagnosis, and treatment plan. Lifestyle modification was also outlined.

## 2024-07-30 NOTE — ASSESSMENT
[FreeTextEntry1] : This is a 78-year-old female with past medical history significant for coronary artery disease s/p CABG x2 (LIMA-LAD, SVG-OM) on 07/18/2024, hypertension, non-insulin-dependent diabetes mellitus, hyperlipidemia, status post hip fusion surgery GERD/dyspepsia, s/p surgery for gastrointestinal stromal tumor with partial gastrectomy in July 2020, status post ventral hernia repair, who comes in for cardiac follow-up evaluation.  She was born in the [place] and has no history of rheumatic fever.  She does not drink excessive caffeine or alcohol.  Cardiac risk factors include hypertension, hyperlipidemia and non-insulin-dependent diabetes mellitus.  HPI: She is here for follow-up after cardiac catheterization done 07/17/2024 demonstrated severe distal LM disease, severe ostial LAD, ostial LCx, mid LAD disease. She is now s/p CABG x2 (LIMA-LAD, SVG-OM) on 07/18/2024 with discharge home on 07/26/2024. She is feeling well, and pain is moderately controlled with current regimen. She's walking with assistance of a rolling walker. Her incision site with staples appears WNL.   Current Medications: Aspirin 81 mg daily, Amiodarone 200 mg daily, Atorvastatin 40 mg daily, Furosemide 40 mg daily, Metoprolol tartrate 25 mg BID, and Spironolactone 25 mg daily.   *Her Zetia fell off after discharge and I've instructed her to restart this along with her Atorvastatin 40 mg daily and have her blood work repeated in 4 weeks. I've discussed that her target LDL is < 55 mg/dL and she may likely require additional lipid lowering medication to achieve this.   **Previously ordered for Repatha but patient never started this after it was prescribed.     BLOOD PRESSURE: Controlled.    BLOOD WORK:  *LDL target goal < 55* -New blood work prescription given to patient on 07/30/2024 to evaluate lipid profile, CBC, BMP, hepatic function, A1C and TSH in 4 weeks.  -Lipid panel done April 2024 demonstrated triglyceride 148, cholesterol 143, HDL 31, LDL 86, non-, LDL direct 87, hemoglobin A1c 6.3%.  -Lipid panel done November 13, 2023 demonstrated cholesterol 116, HDL 28, triglycerides 105, LDL 75, non-HDL cholesterol 88 mg/dL and lipoprotein B 78 mg/dL. -Lipid panel done August 22, 2023 demonstrated cholesterol of 141, triglycerides 149, HDL 31, LDL calculated 84, non-HDL cholesterol 110 mg/dL.  Hemoglobin A1c of 6.4   TESTING/REPORTS: -Cardiac catheterization done 07/17/2024 demonstrated severe distal LM disease, severe ostial LAD, ostial LCx, mid LAD disease. She is now s/p CABG x2 (LIMA-LAD, SVG-OM) on 07/18/2024.   -CT Angiogram done 06/11/2024 demonstrated total Agatston score of 385 units with moderate luminal narrowing of the proximal LAD secondary to mixed calcified and noncalcified plaque. FFR analysis positive for significant stenosis in the mid LAD.  -Pharmacologic nuclear stress test done March 2024 demonstrated mildly abnormal myocardial perfusion with small-sized, mild defects in the mid to distal inferior and apical interior walls that are reversible, doesn't normalize with prone imaging consistent with ischemia. Post-stress left ventricular EF was 78%.   -Exercise stress test done February 28, 2024 was normal at a low workload; the patient was only able to complete stage one of the Tadeo protocol.  -Echocardiogram done February 2024 demonstrated normal left ventricular systolic function EF 65%, mild mitral regurgitation, mild tricuspid regurgitation, minimal pulmonic regurgitation.  -Electrocardiogram done October 5, 2023 demonstrated normal sinus rhythm rate 68 bpm is otherwise unremarkable.  -Echocardiogram done August 2017 with thickened mitral leaflets, minimal mitral regurgitation, calcified trileaflet aortic valve with normal opening, hyperdynamic left ventricle, EF estimated 75-80%    PLAN: -She will restart Zetia 10 mg daily and repeat her blood work in 4 weeks for reevaluation.  -She will continue her other current medications and contact the office if problems arise before next follow-up appointment. -She will follow-up with the [facility] team next month.    I have discussed the plan of care with MELISSA ИВАН and she will follow up in 3 months. They are compliant with all of their medications.   The patient understands that aerobic exercises must be increased to 40 minutes 4 times/week and a detailed discussion of lifestyle modification was done today.   The patient has a good understanding of the diagnosis, treatment plan and lifestyle modification.   They will contact me at the office for any questions with their care or any changes in their health status.   The patient was discussed with supervision physician Dr. García Dobbs at the time of the visit and the plan of care will be carried out as noted above.     OSWALDO Tena NP

## 2024-08-01 ENCOUNTER — APPOINTMENT (OUTPATIENT)
Dept: SURGICAL ONCOLOGY | Facility: CLINIC | Age: 79
End: 2024-08-01

## 2024-08-12 PROBLEM — Z95.1 S/P CABG X 2: Status: RESOLVED | Noted: 2024-07-30 | Resolved: 2024-08-12

## 2024-08-13 ENCOUNTER — APPOINTMENT (OUTPATIENT)
Dept: CARDIOTHORACIC SURGERY | Facility: CLINIC | Age: 79
End: 2024-08-13
Payer: MEDICARE

## 2024-08-13 VITALS
HEART RATE: 75 BPM | RESPIRATION RATE: 16 BRPM | SYSTOLIC BLOOD PRESSURE: 146 MMHG | HEIGHT: 66 IN | OXYGEN SATURATION: 99 % | TEMPERATURE: 98.3 F | WEIGHT: 152 LBS | DIASTOLIC BLOOD PRESSURE: 83 MMHG | BODY MASS INDEX: 24.43 KG/M2

## 2024-08-13 DIAGNOSIS — Z95.1 PRESENCE OF AORTOCORONARY BYPASS GRAFT: ICD-10-CM

## 2024-08-13 PROCEDURE — 99024 POSTOP FOLLOW-UP VISIT: CPT

## 2024-08-13 NOTE — END OF VISIT
[FreeTextEntry3] : Written by Oswalod Werner NP, acting as a scribe for Dr. Barraza The documentation recorded by the scribe accurately reflects the service I personally performed and the decisions made by me. Signature Kong Barraza MD.

## 2024-08-13 NOTE — PHYSICAL EXAM
[] : no respiratory distress [Respiration, Rhythm And Depth] : normal respiratory rhythm and effort [Exaggerated Use Of Accessory Muscles For Inspiration] : no accessory muscle use [Auscultation Breath Sounds / Voice Sounds] : lungs were clear to auscultation bilaterally [Apical Impulse] : the apical impulse was normal [Heart Rate And Rhythm] : heart rate was normal and rhythm regular [Heart Sounds] : normal S1 and S2 [Murmurs] : no murmurs [No Edema] : no edema [Clean] : clean [Dry] : dry [Healing Well] : healing well [Bleeding] : no active bleeding [Foul Odor] : no foul smell [Purulent Drainage] : no purulent drainage [Serosanguinous Drainage] : no serosanguinous drainage [Erythema] : not erythematous [Warm] : not warm [Tender] : not tender [FreeTextEntry5] : Left LE SVG

## 2024-08-13 NOTE — END OF VISIT
[FreeTextEntry3] : Written by Oswaldo Werner NP, acting as a scribe for Dr. Barraza The documentation recorded by the scribe accurately reflects the service I personally performed and the decisions made by me. Signature Kong aBrraza MD.

## 2024-08-13 NOTE — ASSESSMENT
[FreeTextEntry1] : 79 y/o Female w/ PMH of HTN, HLD, Type 2 Diabetes, GIST (gastrointestinal stromal tumor s/p robotic laparoscopic open partial gastrectomy 7/2020), GERD, Noah's esophagus, umbilical hernia initially presented to outpatient cardiologist for routine evaluation. Pt reports she has been experiencing increased fatigue and occasional exertional dyspnea over the past year. Pt underwent CCTA revealing calcium score of 384 which was LAD FFR positive. In light of pt's risk factors and abnormal MPI/CCTA; pt referred to [facility] for cardiac cath which demonstrated severe distal LM disease and severe ostial LAD, ostial LCx, mid LAD disease.   Now s/p CABG x 2 (LIMA-LAD, SVG-OM), LAAL 2A 2v wires, 2 med, Left pleural CT, 1 PRBC, extubated Labile BP IVF given for hypovolemia - Levo weaned to off. Failed Beta blocker challenge-decreased perfusion indices: Nocturnal bipap for LL L collapse on CXR--weaning NC as tolerated based, BB restarted at 12.5 BID POCUS did not reveal any substantial fluid, Amio load for PAF, Thoracentesis at bedside> 500 ml, amio 200 QD DC home.  Followed up with NP at Dr. Dobbs office 7/30. Follows Dr. Dobbs for cardiology. She has also followed up with Onc given hx of GIST with stable CT AP per Once note 8/1/24.   Presents today with boyfriend Kennedy.  Reports doing well, much improved since DC, SOB much better, weight down a few lbs. BP at home 110-120, nervous today. Walking more everyday. Eating and drinking with +BM. Denies chest pain, SOB, swelling, weight gain, palpitations, cough, fever or chills.  Today on exam patient's lungs clear bilaterally, normal sinus rhythm, sternum stable, incision clean, dry and intact. Left LE SVG site is clean, dry and intact. No peripheral edema noted.  Plan:  - Stop Amio todayContinue current medication regimen - Follow up with cardiologist Dr. Dobbs and PCP - Continue to walk and increase as tolerated - Low salt diet and fluids discussed in detail - Tight glucose control discussed in detail for wound healing - Return to office PRN - Call with any questions or concerns

## 2024-08-13 NOTE — ASSESSMENT
[FreeTextEntry1] : 79 y/o Female w/ PMH of HTN, HLD, Type 2 Diabetes, GIST (gastrointestinal stromal tumor s/p robotic laparoscopic open partial gastrectomy 7/2020), GERD, Noah's esophagus, umbilical hernia initially presented to outpatient cardiologist for routine evaluation. Pt reports she has been experiencing increased fatigue and occasional exertional dyspnea over the past year. Pt underwent CCTA revealing calcium score of 384 which was LAD FFR positive. In light of pt's risk factors and abnormal MPI/CCTA; pt referred to [facility] for cardiac cath which demonstrated severe distal LM disease and severe ostial LAD, ostial LCx, mid LAD disease.   Now s/p CABG x 2 (LIMA-LAD, SVG-OM), LAAL 2A 2v wires, 2 med, Left pleural CT, 1 PRBC, extubated Labile BP IVF given for hypovolemia - Levo weaned to off. Failed Beta blocker challenge-decreased perfusion indices: Nocturnal bipap for LL L collapse on CXR--weaning NC as tolerated based, BB restarted at 12.5 BID POCUS did not reveal any substantial fluid, Amio load for PAF, Thoracentesis at bedside> 500 ml, amio 200 QD DC home.  Followed up with NP at Dr. Dobbs office 7/30. Follows Dr. Dobbs for cardiology. She has also followed up with Onc given hx of GIST with stable CT AP per Once note 8/1/24.   Presents today with boyfriend Kennedy.  Reports doing well, much improved since DC, SOB much better, weight down a few lbs. BP at home 110-120, nervous today. Walking more everyday. Eating and drinking with +BM. Denies chest pain, SOB, swelling, weight gain, palpitations, cough, fever or chills.  Today on exam patient's lungs clear bilaterally, normal sinus rhythm, sternum stable, incision clean, dry and intact. Left LE SVG site is clean, dry and intact. No peripheral edema noted.  Plan:  - Stop Amio todayContinue current medication regimen - Follow up with cardiologist Dr. Dobbs and PCP - Continue to walk and increase as tolerated - Low salt diet and fluids discussed in detail - Tight glucose control discussed in detail for wound healing - Return to office PRN - Call with any questions or concerns

## 2024-08-22 ENCOUNTER — APPOINTMENT (OUTPATIENT)
Dept: INTERNAL MEDICINE | Facility: CLINIC | Age: 79
End: 2024-08-22
Payer: MEDICARE

## 2024-08-22 VITALS — SYSTOLIC BLOOD PRESSURE: 127 MMHG | DIASTOLIC BLOOD PRESSURE: 75 MMHG

## 2024-08-22 VITALS
WEIGHT: 152 LBS | SYSTOLIC BLOOD PRESSURE: 161 MMHG | BODY MASS INDEX: 24.53 KG/M2 | HEART RATE: 71 BPM | OXYGEN SATURATION: 98 % | TEMPERATURE: 98.5 F | DIASTOLIC BLOOD PRESSURE: 80 MMHG

## 2024-08-22 DIAGNOSIS — E78.00 PURE HYPERCHOLESTEROLEMIA, UNSPECIFIED: ICD-10-CM

## 2024-08-22 DIAGNOSIS — C49.A0 GASTROINTESTINAL STOMACL TUMOR,UNSPECIFIED SITE: ICD-10-CM

## 2024-08-22 DIAGNOSIS — E11.9 TYPE 2 DIABETES MELLITUS W/OUT COMPLICATIONS: ICD-10-CM

## 2024-08-22 DIAGNOSIS — R06.09 OTHER FORMS OF DYSPNEA: ICD-10-CM

## 2024-08-22 DIAGNOSIS — Z95.1 PRESENCE OF AORTOCORONARY BYPASS GRAFT: ICD-10-CM

## 2024-08-22 DIAGNOSIS — I25.10 ATHEROSCLEROTIC HEART DISEASE OF NATIVE CORONARY ARTERY W/OUT ANGINA PECTORIS: ICD-10-CM

## 2024-08-22 DIAGNOSIS — E78.5 HYPERLIPIDEMIA, UNSPECIFIED: ICD-10-CM

## 2024-08-22 DIAGNOSIS — I10 ESSENTIAL (PRIMARY) HYPERTENSION: ICD-10-CM

## 2024-08-22 PROCEDURE — G2211 COMPLEX E/M VISIT ADD ON: CPT

## 2024-08-22 PROCEDURE — 36415 COLL VENOUS BLD VENIPUNCTURE: CPT

## 2024-08-22 PROCEDURE — 99214 OFFICE O/P EST MOD 30 MIN: CPT

## 2024-08-22 NOTE — HISTORY OF PRESENT ILLNESS
[Spouse] : spouse [de-identified] : 78 y.o. female, PMHx anemia, GIST (s/p robotic laparoscopic possible open partial gastrectomy 7/10/2020, no chemo/xrt) seen today for f/u  had CABG 7/18/24 -- followed by cardio thoracic- scar healing well - breathing better now walking  -taking metoprolol 25 BID  and atorva 40 , saw cardio 7/30 was told to restart ezetimibe ,   taken off spironolactone , furopsemide,amiodarone   Hyperpigmented patches  on face saw derm 12 8/23 dx as Melasma rx with ILK 12/8/23 , cream Triluma qhs  dx Lichen Simplex Chronicus -  was dx with umbilical hernia- might need surgery- saw md recommended no surgery needed   DM - well controlled  - AIc 6.4 8/2023  - Compliant with diet and medications. -- 174 - 190 elevated readings (occasionally higher if eats sweets)  -Lantus 22 units qhs , metformin 500 BID , and Glimepiride 2 mg qd  last Ophth 4/2023 - [facility]   hx Anemia - onset in 2020. Saw gastro did colonoscope -3/2/2020 and EGD - found to have large submucosal mass. 6/1- FNA was consistent with a GIST- s/p robotic laparoscopic possible open partial gastrectomy 7/10/2020 -Disease: GIST  TNM stage: T3, Nx, M0  AJCC Stage: Ib  s/p EGD July 2021- joann - gets EGD annually  - follows with oncology, no chemo/radiation advised at this time - anemia improving since surgery, continues iron supplements - denies abdominal pain, heartburn, diarrhea, constipation - f/u CT abd scan 7/2022 -IMPRESSION: Postsurgical changes without CT evidence of locally recurrent or metastatic disease. - will be getting scans q 1 yr now   Hyperlipidemia- on atorvastatin 40 denies myalgias.  Hypertension- compliant with meds (enalapril, amlodipine), off HCTZ 3/2018 d/t hypercalcemia. Checks BP at home, generally in the 130s, high of 141.   H/o hypercalcemia- monitoring calcium intake, stopped Po ca supplements last ca was normal  - off HCTZ

## 2024-08-22 NOTE — ASSESSMENT
[FreeTextEntry1] : CAD s/p CABG 7/18/24 -scar healing well - no SOB or MATTHEW  s/p cardiac cath 7/17/24 -on metoprolol 25 BID and atorvastatin 40 qd restart enalapril 2.5 qd  -get BW today  -keep appt with cardio  RTC 4 weeks   Hypertension:127/75 on repeat  --only on metoprolol 25 BID -after cabg- pt to log readings at home call if SBP > 140- restart enalapril 2.5  -ekg-NSR at 90 Bpm cardiology evaluation 10/5/23 reviewed - added ezetimibe 10 10/2023 did BW 11/2023 - LDL 68  ECHO 2/28/24 -EF 60 to 65 %  Nuclear stress test- 3/2024 - EF 78 % - Mild defect + ischemia  -- CT angio 6/11/24- IMPRESSION: 1. Moderate (just above 50%) luminal narrowing of the proximal LAD secondary to mixed calcified and noncalcified plaque. Study will be sent for FFR-CT analysis. 2. Mild luminal narrowing of the mid LAD secondary to mixed calcified and noncalcified plaque. 3. Mild luminal narrowing of the proximal-mid LCx secondary to mixed calcified and noncalcified plaque. 4. The calculated Agatston score is 384. --+ MATTHEW  - educated low salt diet , avoid canned , processed and fast food ,chips , bagged items , start exercise daily 30- 40 minutes , loose weight - HCTZ was discontinued d/t hypercalcemia - regular physical activity encouraged -check BP next visit  Diabetes type 2-AIC 6.7 5/2023 -> 6.4 8/2023 --> POC AIC  6.5 stable 11/20/23 -POC AIC 6.5 stable 2/9/24 --> 6.3 4/24 improving  -on metformin 500 BID , glimepiride 2 qd and lantus 22 qhs  + microalbuminuria- jardiance 10 qgd-not covered by insurance - very high co pay - diet low in carbs, continue current medications. Monitor Accu-Cheks daily - ophthalmology every 6 months last 4//23- no retinopathy - records requested - encouraged to increase physical activity  Hyperlipidemia-ASCVD risk 39 % on atorva 40 qd and ezetimibe  LDL-68 11/2023 --> 87 4/24 - was started on Repartha inj 5/2024 -stopped 7/2024  -continues statin, working on diet and exercise  forehead discoloration --Hyperpigmented patches  on face saw new derm 12 8/23 dx as Melasma rx with ILK 12/8/23 , cream Triluma qhs --significant improvement  dx Lichen Simplex Chronicus -improved a lot very happy with results - has f/u 2/12/24  - since 1 yr followed by Derm - using cream cerevy etc hydroquinone 4% , triamcinolone cream last visits 1/2023 adv f/u   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 -followed by Surgeon and Oncologist , and gastro TNM stage: T3, Nx, M0 AJCC Stage: Ib - surveillance scan q 6 months, did CT abd pelvis Jan 2022-IMPRESSION: The patient is again noted to be status post partial gastrectomy. Nonspecific enhancing focus measuring 3 to 4 mm along the inferolateral gastric body wall unchanged. No lymphadenopathy appreciated. - s/ EGD 6 /2021 - follows with oncology, no chemo/radiation advised at this time - anemia improving since surgery, continues iron supplements - denies abdominal pain, heartburn, diarrhea, constipation -- f/u CT abd scan 7/2022 -IMPRESSION: Postsurgical changes without CT evidence of locally recurrent or metastatic disease. -Ct abd pelvis 7/2023 -IMPRESSION: Stable exam. Status post partial gastrectomy with a stable nonspecific enhancing focus along the lateral gastric body wall. - will be getting scans q 1 yr now  hx Iron deficiency anemia:cbc 1/2022 nl -Improving since GI surgery, continues iron supplements -Labs today  Proteinuria:- will check with insurance add GLP2 inhibitor ( very high copay ) -good DM and BP control -recheck urine today  h/o hep c s/p interferon rx , RNA negative - AFP neg  HM mammo 6/2023 - bi rad 1- pt deferred to 2025  pap->6/1/23 colonoscope -3/2020 UTD dental skin screening 1/12/23 bone density 6/2023 - osteopenia  tdap- 5/2013 UTD pneumococcal vaccines- prevnar 2015, pneumovax 2016 Flu shot- 9/2023  Completed COVID vaccine series- Moderna-3/13/21, 4/10/21 booster was 11/27/2021 ACP discussed, information provided, encouraged to review with spouse. Shingrex 9/2023 , 6/2023

## 2024-08-23 LAB
ALBUMIN SERPL ELPH-MCNC: 4.3 G/DL
ALP BLD-CCNC: 59 U/L
ALT SERPL-CCNC: 11 U/L
ANION GAP SERPL CALC-SCNC: 12 MMOL/L
AST SERPL-CCNC: 15 U/L
BILIRUB SERPL-MCNC: 0.3 MG/DL
BUN SERPL-MCNC: 12 MG/DL
CALCIUM SERPL-MCNC: 10.5 MG/DL
CHLORIDE SERPL-SCNC: 104 MMOL/L
CHOLEST SERPL-MCNC: 95 MG/DL
CO2 SERPL-SCNC: 25 MMOL/L
CREAT SERPL-MCNC: 0.84 MG/DL
EGFR: 71 ML/MIN/1.73M2
ESTIMATED AVERAGE GLUCOSE: 117 MG/DL
GLUCOSE SERPL-MCNC: 117 MG/DL
HBA1C MFR BLD HPLC: 5.7 %
HCT VFR BLD CALC: 35.1 %
HDLC SERPL-MCNC: 28 MG/DL
HGB BLD-MCNC: 11 G/DL
LDLC SERPL CALC-MCNC: 46 MG/DL
MAGNESIUM SERPL-MCNC: 1.7 MG/DL
MCHC RBC-ENTMCNC: 24.6 PG
MCHC RBC-ENTMCNC: 31.3 GM/DL
MCV RBC AUTO: 78.5 FL
NONHDLC SERPL-MCNC: 67 MG/DL
PLATELET # BLD AUTO: 418 K/UL
POTASSIUM SERPL-SCNC: 5.1 MMOL/L
PROT SERPL-MCNC: 7.8 G/DL
RBC # BLD: 4.47 M/UL
RBC # FLD: 17.2 %
SODIUM SERPL-SCNC: 140 MMOL/L
TRIGL SERPL-MCNC: 114 MG/DL
TSH SERPL-ACNC: 2.85 UIU/ML
VIT B12 SERPL-MCNC: 1673 PG/ML
WBC # FLD AUTO: 4.65 K/UL

## 2024-09-26 ENCOUNTER — APPOINTMENT (OUTPATIENT)
Dept: INTERNAL MEDICINE | Facility: CLINIC | Age: 79
End: 2024-09-26
Payer: MEDICARE

## 2024-09-26 ENCOUNTER — NON-APPOINTMENT (OUTPATIENT)
Age: 79
End: 2024-09-26

## 2024-09-26 VITALS — SYSTOLIC BLOOD PRESSURE: 123 MMHG | DIASTOLIC BLOOD PRESSURE: 78 MMHG

## 2024-09-26 VITALS
TEMPERATURE: 98.4 F | WEIGHT: 151 LBS | DIASTOLIC BLOOD PRESSURE: 88 MMHG | OXYGEN SATURATION: 98 % | BODY MASS INDEX: 24.37 KG/M2 | HEART RATE: 109 BPM | SYSTOLIC BLOOD PRESSURE: 168 MMHG

## 2024-09-26 DIAGNOSIS — E78.5 HYPERLIPIDEMIA, UNSPECIFIED: ICD-10-CM

## 2024-09-26 DIAGNOSIS — Z23 ENCOUNTER FOR IMMUNIZATION: ICD-10-CM

## 2024-09-26 DIAGNOSIS — E11.9 TYPE 2 DIABETES MELLITUS W/OUT COMPLICATIONS: ICD-10-CM

## 2024-09-26 DIAGNOSIS — I10 ESSENTIAL (PRIMARY) HYPERTENSION: ICD-10-CM

## 2024-09-26 DIAGNOSIS — Z95.1 PRESENCE OF AORTOCORONARY BYPASS GRAFT: ICD-10-CM

## 2024-09-26 PROCEDURE — G2211 COMPLEX E/M VISIT ADD ON: CPT

## 2024-09-26 PROCEDURE — 99214 OFFICE O/P EST MOD 30 MIN: CPT

## 2024-09-26 PROCEDURE — 93000 ELECTROCARDIOGRAM COMPLETE: CPT

## 2024-09-26 PROCEDURE — G0008: CPT

## 2024-09-26 PROCEDURE — 90662 IIV NO PRSV INCREASED AG IM: CPT

## 2024-09-26 NOTE — HISTORY OF PRESENT ILLNESS
[Spouse] : spouse [de-identified] : 79 y.o. female, PMHx anemia, GIST (s/p robotic laparoscopic possible open partial gastrectomy 7/10/2020, no chemo/xrt) seen today for f/u  Hypertension-Bp running high at home - 140-150 / 80-74  - compliant with meds (enalapril 2.5 , metoprolol 25 BD ), off HCTZ 3/2018 d/t hypercalcemia. Checks BP at home, generally in the 130s, high of 141.   had CABG 7/18/24 -- followed by cardio thoracic- scar healing well - breathing better now walking  -taking metoprolol 25 BID  and atorva 40 , saw cardio 7/30 was told to restart ezetimibe ,   taken off spironolactone , furopsemide,amiodarone   Hyperpigmented patches  on face saw derm 12 8/23 dx as Melasma rx with ILK 12/8/23 , cream Triluma qhs  dx Lichen Simplex Chronicus -  was dx with umbilical hernia- might need surgery- saw md recommended no surgery needed   DM - well controlled  - AIc 5.9 8/2024  - Compliant with diet and medications. -- 174 - 190 elevated readings (occasionally higher if eats sweets)  -Lantus 22 units qhs , metformin 500 BID , and Glimepiride 2 mg qd  last Ophth 8/2024  - [facility]   hx Anemia - onset in 2020. Saw gastro did colonoscope -3/2/2020 and EGD - found to have large submucosal mass. 6/1- FNA was consistent with a GIST- s/p robotic laparoscopic possible open partial gastrectomy 7/10/2020 -Disease: GIST  TNM stage: T3, Nx, M0  AJCC Stage: Ib  s/p EGD July 2021- joann - gets EGD annually  - follows with oncology, no chemo/radiation advised at this time - anemia improving since surgery, continues iron supplements - denies abdominal pain, heartburn, diarrhea, constipation - f/u CT abd scan 7/2022 -IMPRESSION: Postsurgical changes without CT evidence of locally recurrent or metastatic disease. - will be getting scans q 1 yr now   Hyperlipidemia- on atorvastatin 40 denies myalgias.  H/o hypercalcemia- monitoring calcium intake, stopped Po ca supplements last ca was normal  - off HCTZ

## 2024-09-26 NOTE — ASSESSMENT
[FreeTextEntry1] : CAD s/p CABG 7/18/24 -scar healing well - no SOB or MATTHEW  s/p cardiac cath 7/17/24 -on metoprolol 25 BID and atorvastatin 40 qd restart enalapril 2.5 qd  -get BW today  -keep appt with cardio  RTC 4 weeks   Hypertension:123/78  on repeat ( missed 1 days metoprolol - needs refills - rx send )  --only on metoprolol 25 BID -after cabg- pt to log readings at home call if SBP > 140- restart enalapril 2.5 8/24  -ekg-sinus tach at 108 bpm 9/26/24  cardiology evaluation 10/5/23 reviewed - added ezetimibe 10 10/2023 did BW 11/2023 - LDL 68  ECHO 2/28/24 -EF 60 to 65 %  Nuclear stress test- 3/2024 - EF 78 % - Mild defect + ischemia  -- CT angio 6/11/24- IMPRESSION: 1. Moderate (just above 50%) luminal narrowing of the proximal LAD secondary to mixed calcified and noncalcified plaque. Study will be sent for FFR-CT analysis. 2. Mild luminal narrowing of the mid LAD secondary to mixed calcified and noncalcified plaque. 3. Mild luminal narrowing of the proximal-mid LCx secondary to mixed calcified and noncalcified plaque. 4. The calculated Agatston score is 384. --+ MATTHEW  - educated low salt diet , avoid canned , processed and fast food ,chips , bagged items , start exercise daily 30- 40 minutes , loose weight - HCTZ was discontinued d/t hypercalcemia - regular physical activity encouraged -check BP next visit  Diabetes type 2-AIC 6.7 5/2023 -> 6.4 8/2023 --> POC AIC  6.5 stable 11/20/23 -POC AIC 6.5 stable 2/9/24 --> 6.3 4/24--> 5.7 8/2024 improving  -on metformin 500 BID , glimepiride 2 qd and lantus 22 qhs  + microalbuminuria- jardiance 10 qgd-not covered by insurance - very high co pay - diet low in carbs, continue current medications. Monitor Accu-Cheks daily - ophthalmology every 6 months last 4//23- no retinopathy - records requested - encouraged to increase physical activity  Hyperlipidemia-ASCVD risk 39 % on atorva 40 qd and ezetimibe  LDL-68 11/2023 --> 87 4/24 - -> 46 8/2024  -was started on Repartha inj 5/2024 -stopped 7/2024  -continues statin, working on diet and exercise  forehead discoloration --Hyperpigmented patches  on face saw new derm 12 8/23 dx as Melasma rx with ILK 12/8/23 , cream Triluma qhs --significant improvement  dx Lichen Simplex Chronicus -improved a lot very happy with results - has f/u 2/12/24  - since 1 yr followed by Derm - using cream cerevy etc hydroquinone 4% , triamcinolone cream last visits 1/2023 adv f/u   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 -followed by Surgeon and Oncologist , and gastro TNM stage: T3, Nx, M0 AJCC Stage: Ib - surveillance scan q 6 months, did CT abd pelvis Jan 2022-IMPRESSION: The patient is again noted to be status post partial gastrectomy. Nonspecific enhancing focus measuring 3 to 4 mm along the inferolateral gastric body wall unchanged. No lymphadenopathy appreciated. - s/ EGD 6 /2021 - follows with oncology, no chemo/radiation advised at this time - anemia improving since surgery, continues iron supplements - denies abdominal pain, heartburn, diarrhea, constipation -- f/u CT abd scan 7/2022 -IMPRESSION: Postsurgical changes without CT evidence of locally recurrent or metastatic disease. -Ct abd pelvis 7/2023 -IMPRESSION: Stable exam. Status post partial gastrectomy with a stable nonspecific enhancing focus along the lateral gastric body wall. - will be getting scans q 1 yr now  hx Iron deficiency anemia:cbc 1/2022 nl -Improving since GI surgery, continues iron supplements -Labs today  Proteinuria:- will check with insurance add GLP2 inhibitor ( very high copay ) -good DM and BP control -recheck urine today  h/o hep c s/p interferon rx , RNA negative - AFP neg  HM mammo 6/2023 - bi rad 1- pt deferred to 2025  pap->6/1/23 colonoscope -3/2020 UTD dental skin screening 1/12/23 bone density 6/2023 - osteopenia  tdap- 5/2013 UTD pneumococcal vaccines- prevnar 2015, pneumovax 2016 Flu shot- 9/26/24 Completed COVID vaccine series- Moderna-3/13/21, 4/10/21 booster was 11/27/2021 ACP discussed, information provided, encouraged to review with spouse. Shingrex 9/2023 , 6/2023

## 2024-09-27 ENCOUNTER — RX RENEWAL (OUTPATIENT)
Age: 79
End: 2024-09-27

## 2024-10-11 ENCOUNTER — APPOINTMENT (OUTPATIENT)
Dept: GASTROENTEROLOGY | Facility: HOSPITAL | Age: 79
End: 2024-10-11

## 2024-10-14 ENCOUNTER — NON-APPOINTMENT (OUTPATIENT)
Age: 79
End: 2024-10-14

## 2024-10-31 ENCOUNTER — RX RENEWAL (OUTPATIENT)
Age: 79
End: 2024-10-31

## 2024-11-07 ENCOUNTER — APPOINTMENT (OUTPATIENT)
Dept: CARDIOLOGY | Facility: CLINIC | Age: 79
End: 2024-11-07
Payer: MEDICARE

## 2024-11-07 VITALS
DIASTOLIC BLOOD PRESSURE: 76 MMHG | WEIGHT: 151 LBS | SYSTOLIC BLOOD PRESSURE: 128 MMHG | OXYGEN SATURATION: 100 % | RESPIRATION RATE: 16 BRPM | HEIGHT: 66 IN | HEART RATE: 86 BPM | TEMPERATURE: 96 F | BODY MASS INDEX: 24.27 KG/M2

## 2024-11-07 DIAGNOSIS — Z95.1 PRESENCE OF AORTOCORONARY BYPASS GRAFT: ICD-10-CM

## 2024-11-07 DIAGNOSIS — E78.5 HYPERLIPIDEMIA, UNSPECIFIED: ICD-10-CM

## 2024-11-07 DIAGNOSIS — I10 ESSENTIAL (PRIMARY) HYPERTENSION: ICD-10-CM

## 2024-11-07 DIAGNOSIS — E11.9 TYPE 2 DIABETES MELLITUS W/OUT COMPLICATIONS: ICD-10-CM

## 2024-11-07 DIAGNOSIS — I34.0 NONRHEUMATIC MITRAL (VALVE) INSUFFICIENCY: ICD-10-CM

## 2024-11-07 DIAGNOSIS — I25.10 ATHEROSCLEROTIC HEART DISEASE OF NATIVE CORONARY ARTERY W/OUT ANGINA PECTORIS: ICD-10-CM

## 2024-11-07 DIAGNOSIS — I07.1 RHEUMATIC TRICUSPID INSUFFICIENCY: ICD-10-CM

## 2024-11-07 PROCEDURE — G2211 COMPLEX E/M VISIT ADD ON: CPT

## 2024-11-07 PROCEDURE — 99214 OFFICE O/P EST MOD 30 MIN: CPT

## 2024-11-13 ENCOUNTER — RX RENEWAL (OUTPATIENT)
Age: 79
End: 2024-11-13

## 2024-11-13 RX ORDER — INSULIN GLARGINE 100 [IU]/ML
100 INJECTION, SOLUTION SUBCUTANEOUS
Qty: 3 | Refills: 2 | Status: ACTIVE | COMMUNITY
Start: 2024-11-13 | End: 1900-01-01

## 2024-11-14 ENCOUNTER — RX RENEWAL (OUTPATIENT)
Age: 79
End: 2024-11-14

## 2024-12-16 ENCOUNTER — APPOINTMENT (OUTPATIENT)
Dept: INTERNAL MEDICINE | Facility: CLINIC | Age: 79
End: 2024-12-16
Payer: MEDICARE

## 2024-12-16 VITALS
DIASTOLIC BLOOD PRESSURE: 87 MMHG | BODY MASS INDEX: 24.75 KG/M2 | HEART RATE: 72 BPM | WEIGHT: 154 LBS | SYSTOLIC BLOOD PRESSURE: 166 MMHG | OXYGEN SATURATION: 98 % | TEMPERATURE: 98.5 F | HEIGHT: 66 IN

## 2024-12-16 VITALS — SYSTOLIC BLOOD PRESSURE: 138 MMHG | DIASTOLIC BLOOD PRESSURE: 83 MMHG

## 2024-12-16 VITALS — SYSTOLIC BLOOD PRESSURE: 143 MMHG | DIASTOLIC BLOOD PRESSURE: 85 MMHG

## 2024-12-16 DIAGNOSIS — I10 ESSENTIAL (PRIMARY) HYPERTENSION: ICD-10-CM

## 2024-12-16 DIAGNOSIS — Z95.1 PRESENCE OF AORTOCORONARY BYPASS GRAFT: ICD-10-CM

## 2024-12-16 DIAGNOSIS — E11.9 TYPE 2 DIABETES MELLITUS W/OUT COMPLICATIONS: ICD-10-CM

## 2024-12-16 DIAGNOSIS — D64.9 ANEMIA, UNSPECIFIED: ICD-10-CM

## 2024-12-16 DIAGNOSIS — E78.5 HYPERLIPIDEMIA, UNSPECIFIED: ICD-10-CM

## 2024-12-16 PROCEDURE — G0009: CPT

## 2024-12-16 PROCEDURE — 36415 COLL VENOUS BLD VENIPUNCTURE: CPT

## 2024-12-16 PROCEDURE — 99214 OFFICE O/P EST MOD 30 MIN: CPT

## 2024-12-16 PROCEDURE — G2211 COMPLEX E/M VISIT ADD ON: CPT

## 2024-12-16 PROCEDURE — 90677 PCV20 VACCINE IM: CPT

## 2024-12-17 NOTE — HISTORY OF PRESENT ILLNESS
[FreeTextEntry1] : 79 y/o Female w/ PMH of HTN, HLD, Type 2 Diabetes, GIST (gastrointestinal  stromal tumor s/p robotic laparoscopic open partial gastrectomy 7/2020), GERD,  Noah's esophagus, umbilical hernia initially presented to outpatient  cardiologist for routine evaluation. Pt reports she has been experiencing  increased fatigue and occasional exertional dyspnea over the past year. Pt  underwent CCTA revealing calcium score of 384 which was LAD FFR positive. In  light of pt's risk factors and abnormal MPI/CCTA; pt referred to [facility] for  cardiac cath which demonstrated  severe distal LM disease and severe ostial LAD, ostial LCx, mid LAD disease.  Now s/p CABG.    HOSPITAL COURSE  7/18:  CABG x 2 (LIMA-LAD, SVG-OM), LAAL 2A 2v wires, 2 med, Left pleural CT, 1  PRBC, extubated  7/19:  Tolerated BIPAP overnight. Labile BP IVF given for hypovolemia - Levo  weaned to off.  Failed Beta blocker challenge-decreased perfusion indices  7/20: Nocturnal bipap for LL L collapse on CXR--weaning NC as tolerated based  on O2 sats.  BB restarted at 12.5 BID Transferred to step down, Med 1, Jose Eduardo ROJAS  7/21 VSS pws d/cd - will d/c med ct in 4 hrs. rounds made w/ with DR. Edmonds.  will d/c IJ.  & diurese. tolerating low dose bb  7/22  CT is out.  Increase BB, ambulate.  7/23 PA attd and POCUS did not reveal any substantial fluid , remains RA  7/24 Amio load for PAF  Reports freq soft stools>DC cathartics  Add diuretics  Thoracentesis at bedside> 500 ml  7/25 Reduce amio 200 QD  DC home  Disposition: Home no skilled PT needs, rolling walker 7/26 Initial visit completed at home  CC " I am doing well" 
normal

## 2024-12-19 LAB
ALBUMIN SERPL ELPH-MCNC: 4.6 G/DL
ANION GAP SERPL CALC-SCNC: 13 MMOL/L
BUN SERPL-MCNC: 13 MG/DL
CALCIUM SERPL-MCNC: 10 MG/DL
CHLORIDE SERPL-SCNC: 104 MMOL/L
CO2 SERPL-SCNC: 25 MMOL/L
CREAT SERPL-MCNC: 0.66 MG/DL
EGFR: 89 ML/MIN/1.73M2
ESTIMATED AVERAGE GLUCOSE: 134 MG/DL
GLUCOSE SERPL-MCNC: 72 MG/DL
HBA1C MFR BLD HPLC: 6.3 %
PHOSPHATE SERPL-MCNC: 3.1 MG/DL
POTASSIUM SERPL-SCNC: 4.4 MMOL/L
SODIUM SERPL-SCNC: 142 MMOL/L

## 2025-01-31 ENCOUNTER — NON-APPOINTMENT (OUTPATIENT)
Age: 80
End: 2025-01-31

## 2025-02-11 NOTE — ASU PATIENT PROFILE, ADULT - NSICDXPASTMEDICALHX_GEN_ALL_CORE_FT
PAST MEDICAL HISTORY:  Diabetes mellitus type 2, stable    Gastrointestinal stromal tumor (GIST)     GERD (gastroesophageal reflux disease)     Hepatitis C resolved 2008    History of Cheng's esophagus     Hyperlipidemia     Hypertension

## 2025-02-12 ENCOUNTER — OUTPATIENT (OUTPATIENT)
Dept: OUTPATIENT SERVICES | Facility: HOSPITAL | Age: 80
LOS: 1 days | Discharge: HOME | End: 2025-02-12

## 2025-02-12 ENCOUNTER — APPOINTMENT (OUTPATIENT)
Dept: GASTROENTEROLOGY | Facility: HOSPITAL | Age: 80
End: 2025-02-12

## 2025-02-12 VITALS
SYSTOLIC BLOOD PRESSURE: 165 MMHG | TEMPERATURE: 97 F | WEIGHT: 154.1 LBS | RESPIRATION RATE: 13 BRPM | HEIGHT: 66 IN | DIASTOLIC BLOOD PRESSURE: 68 MMHG | OXYGEN SATURATION: 99 % | HEART RATE: 77 BPM

## 2025-02-12 DIAGNOSIS — D3A.8 OTHER BENIGN NEUROENDOCRINE TUMORS: ICD-10-CM

## 2025-02-12 DIAGNOSIS — Z95.1 PRESENCE OF AORTOCORONARY BYPASS GRAFT: Chronic | ICD-10-CM

## 2025-02-12 DIAGNOSIS — Z98.49 CATARACT EXTRACTION STATUS, UNSPECIFIED EYE: Chronic | ICD-10-CM

## 2025-02-12 DIAGNOSIS — Z98.890 OTHER SPECIFIED POSTPROCEDURAL STATES: Chronic | ICD-10-CM

## 2025-02-12 LAB — GLUCOSE BLDC GLUCOMTR-MCNC: 96 MG/DL — SIGNIFICANT CHANGE UP (ref 70–99)

## 2025-02-12 NOTE — PRE PROCEDURE NOTE - PRE PROCEDURE EVALUATION
Attending Physician:   Robi                 Procedure: EGD EUS    Indication for Procedure: surveillance for hx of NET in the fundus, hx of large GIST s/p partial gastrectomy   ________________________________________________________  PAST MEDICAL & SURGICAL HISTORY:  Hypertension      Hyperlipidemia      Diabetes mellitus  type 2, stable      Hepatitis C  resolved 2008      Gastrointestinal stromal tumor (GIST)      GERD (gastroesophageal reflux disease)      History of Cheng's esophagus      S/P cataract surgery      History of hip surgery  left      S/P CABG x 2        ALLERGIES:  No Known Allergies    HOME MEDICATIONS:  aspirin 81 mg oral tablet: 1 tablet with sensor orally once a day  ezetimibe 10 mg oral tablet: 1 tab(s) orally once a day  furosemide 40 mg oral tablet: 1 tab(s) orally once a day  glimepiride 2 mg oral tablet: 1 tab(s) orally once a day  Lantus 100 units/mL subcutaneous solution: 22 unit(s) subcutaneous once a day (at bedtime)  metFORMIN 500 mg oral tablet: 1 tab(s) orally 2 times a day  spironolactone 25 mg oral tablet: 1 tab(s) orally once a day  Vitamin B12 1000 mcg oral tablet: 1 tab(s) orally once a day  Vitamin D3 400 intl units (10 mcg) oral capsule: 1 cap(s) orally once a day  vitamin E 200 intl units oral tablet: 1 tab(s) orally once a day    AICD/PPM: [ ] yes   [x ] no    PERTINENT LAB DATA:                      PHYSICAL EXAMINATION:    Height (cm): 167.6  Weight (kg): 69.9  BMI (kg/m2): 24.9  BSA (m2): 1.79T(C): 36  HR: 77  BP: --  RR: 13  SpO2: 99%    Constitutional: NAD  Neck:  supple  Respiratory: no respiratory stress, no audible wheezes  Cardiovascular: RR  Gastrointestinal: soft, NT/ND  Extremities: No peripheral edema  Neurological: Alert, no focal deficits  Psychiatric: Normal mood, normal affect  Skin: No rashes      COMMENTS:    The patient is a suitable candidate for the planned procedure unless box checked [ ]  No, explain:

## 2025-03-21 ENCOUNTER — RX RENEWAL (OUTPATIENT)
Age: 80
End: 2025-03-21

## 2025-04-29 ENCOUNTER — NON-APPOINTMENT (OUTPATIENT)
Age: 80
End: 2025-04-29

## 2025-05-01 ENCOUNTER — LABORATORY RESULT (OUTPATIENT)
Age: 80
End: 2025-05-01

## 2025-05-01 ENCOUNTER — NON-APPOINTMENT (OUTPATIENT)
Age: 80
End: 2025-05-01

## 2025-05-01 ENCOUNTER — APPOINTMENT (OUTPATIENT)
Dept: CARDIOLOGY | Facility: CLINIC | Age: 80
End: 2025-05-01
Payer: MEDICARE

## 2025-05-01 VITALS
HEIGHT: 66 IN | OXYGEN SATURATION: 98 % | SYSTOLIC BLOOD PRESSURE: 170 MMHG | TEMPERATURE: 97 F | WEIGHT: 154 LBS | HEART RATE: 87 BPM | DIASTOLIC BLOOD PRESSURE: 93 MMHG | BODY MASS INDEX: 24.75 KG/M2

## 2025-05-01 DIAGNOSIS — E78.5 HYPERLIPIDEMIA, UNSPECIFIED: ICD-10-CM

## 2025-05-01 DIAGNOSIS — I25.10 ATHEROSCLEROTIC HEART DISEASE OF NATIVE CORONARY ARTERY W/OUT ANGINA PECTORIS: ICD-10-CM

## 2025-05-01 DIAGNOSIS — I34.0 NONRHEUMATIC MITRAL (VALVE) INSUFFICIENCY: ICD-10-CM

## 2025-05-01 DIAGNOSIS — I10 ESSENTIAL (PRIMARY) HYPERTENSION: ICD-10-CM

## 2025-05-01 DIAGNOSIS — E11.9 TYPE 2 DIABETES MELLITUS W/OUT COMPLICATIONS: ICD-10-CM

## 2025-05-01 DIAGNOSIS — Z95.1 PRESENCE OF AORTOCORONARY BYPASS GRAFT: ICD-10-CM

## 2025-05-01 PROCEDURE — G2211 COMPLEX E/M VISIT ADD ON: CPT

## 2025-05-01 PROCEDURE — 93000 ELECTROCARDIOGRAM COMPLETE: CPT

## 2025-05-01 PROCEDURE — 99214 OFFICE O/P EST MOD 30 MIN: CPT

## 2025-05-01 RX ORDER — TELMISARTAN 80 MG/1
80 TABLET ORAL
Qty: 90 | Refills: 3 | Status: ACTIVE | COMMUNITY
Start: 2025-05-01 | End: 1900-01-01

## 2025-05-15 RX ORDER — EVOLOCUMAB 140 MG/ML
140 INJECTION, SOLUTION SUBCUTANEOUS
Qty: 3 | Refills: 1 | Status: ACTIVE | COMMUNITY
Start: 2025-05-08

## 2025-05-20 NOTE — ASU PATIENT PROFILE, ADULT - FALL HARM RISK - HARM RISK INTERVENTIONS

## 2025-05-21 ENCOUNTER — APPOINTMENT (OUTPATIENT)
Dept: GASTROENTEROLOGY | Facility: HOSPITAL | Age: 80
End: 2025-05-21

## 2025-05-21 ENCOUNTER — TRANSCRIPTION ENCOUNTER (OUTPATIENT)
Age: 80
End: 2025-05-21

## 2025-05-21 ENCOUNTER — OUTPATIENT (OUTPATIENT)
Dept: OUTPATIENT SERVICES | Facility: HOSPITAL | Age: 80
LOS: 1 days | End: 2025-05-21
Payer: MEDICARE

## 2025-05-21 VITALS
SYSTOLIC BLOOD PRESSURE: 158 MMHG | HEIGHT: 66 IN | RESPIRATION RATE: 16 BRPM | HEART RATE: 77 BPM | DIASTOLIC BLOOD PRESSURE: 72 MMHG | TEMPERATURE: 97 F | OXYGEN SATURATION: 99 % | WEIGHT: 154.1 LBS

## 2025-05-21 VITALS
OXYGEN SATURATION: 98 % | DIASTOLIC BLOOD PRESSURE: 64 MMHG | SYSTOLIC BLOOD PRESSURE: 118 MMHG | HEART RATE: 69 BPM | RESPIRATION RATE: 18 BRPM

## 2025-05-21 DIAGNOSIS — D3A.8 OTHER BENIGN NEUROENDOCRINE TUMORS: ICD-10-CM

## 2025-05-21 DIAGNOSIS — Z98.49 CATARACT EXTRACTION STATUS, UNSPECIFIED EYE: Chronic | ICD-10-CM

## 2025-05-21 DIAGNOSIS — Z95.1 PRESENCE OF AORTOCORONARY BYPASS GRAFT: Chronic | ICD-10-CM

## 2025-05-21 DIAGNOSIS — Z98.890 OTHER SPECIFIED POSTPROCEDURAL STATES: Chronic | ICD-10-CM

## 2025-05-21 LAB
GLUCOSE BLDC GLUCOMTR-MCNC: 84 MG/DL — SIGNIFICANT CHANGE UP (ref 70–99)
GLUCOSE BLDC GLUCOMTR-MCNC: 97 MG/DL — SIGNIFICANT CHANGE UP (ref 70–99)

## 2025-05-21 PROCEDURE — 43237 ENDOSCOPIC US EXAM ESOPH: CPT | Mod: GC

## 2025-05-21 NOTE — PRE PROCEDURE NOTE - PRE PROCEDURE EVALUATION
Attending Physician: Robi                  Procedure: EUS    Indication for Procedure: hx of NET and gastrectomy, surveillance   ________________________________________________________  PAST MEDICAL & SURGICAL HISTORY:  Hypertension      Hyperlipidemia      Diabetes mellitus  type 2, stable      Hepatitis C  resolved 2008      Gastrointestinal stromal tumor (GIST)      GERD (gastroesophageal reflux disease)      History of Cheng's esophagus      S/P cataract surgery      History of hip surgery  left      S/P CABG x 2        ALLERGIES:  No Known Allergies    HOME MEDICATIONS:  aspirin 81 mg oral tablet: 1 tablet with sensor orally once a day  ezetimibe 10 mg oral tablet: 1 tab(s) orally once a day  furosemide 40 mg oral tablet: 1 tab(s) orally once a day  glimepiride 2 mg oral tablet: 1 tab(s) orally once a day  Lantus 100 units/mL subcutaneous solution: 22 unit(s) subcutaneous once a day (at bedtime)  metFORMIN 500 mg oral tablet: 1 tab(s) orally 2 times a day  spironolactone 25 mg oral tablet: 1 tab(s) orally once a day  Vitamin B12 1000 mcg oral tablet: 1 tab(s) orally once a day  Vitamin D3 400 intl units (10 mcg) oral capsule: 1 cap(s) orally once a day  vitamin E 200 intl units oral tablet: 1 tab(s) orally once a day    AICD/PPM: [ ] yes   [x ] no    PERTINENT LAB DATA:                      PHYSICAL EXAMINATION:    vitals wnl     Constitutional: NAD  Neck:  supple  Respiratory: no respiratory stress, no audible wheezes  Cardiovascular: RR  Gastrointestinal: soft, NT/ND  Extremities: No peripheral edema  Neurological: Alert, no focal deficits  Psychiatric: Normal mood, normal affect  Skin: No rashes      COMMENTS:    The patient is a suitable candidate for the planned procedure unless box checked [ ]  No, explain:

## 2025-05-21 NOTE — ASU DISCHARGE PLAN (ADULT/PEDIATRIC) - FINANCIAL ASSISTANCE
[facility] provides services at a reduced cost to those who are determined to be eligible through [facility] [employer]’s financial assistance program. Information regarding [facility] [employer]’s financial assistance program can be found by going to [email or web address] or by calling [phone].

## 2025-05-21 NOTE — ASU DISCHARGE PLAN (ADULT/PEDIATRIC) - NS MD DC FALL RISK RISK
For information on Fall & Injury Prevention, visit: [email or web address] OR  [email or web address] OR  https://www.cdc.gov/steadi/patient.html

## 2025-05-22 ENCOUNTER — APPOINTMENT (OUTPATIENT)
Dept: INTERNAL MEDICINE | Facility: CLINIC | Age: 80
End: 2025-05-22
Payer: MEDICARE

## 2025-05-22 VITALS
DIASTOLIC BLOOD PRESSURE: 74 MMHG | TEMPERATURE: 97.8 F | HEART RATE: 78 BPM | BODY MASS INDEX: 24.11 KG/M2 | HEIGHT: 66 IN | OXYGEN SATURATION: 97 % | SYSTOLIC BLOOD PRESSURE: 151 MMHG | WEIGHT: 150 LBS

## 2025-05-22 DIAGNOSIS — Z00.00 ENCOUNTER FOR GENERAL ADULT MEDICAL EXAMINATION W/OUT ABNORMAL FINDINGS: ICD-10-CM

## 2025-05-22 PROCEDURE — G0439: CPT

## 2025-05-22 PROCEDURE — 36415 COLL VENOUS BLD VENIPUNCTURE: CPT

## 2025-05-23 ENCOUNTER — APPOINTMENT (OUTPATIENT)
Dept: INTERNAL MEDICINE | Facility: CLINIC | Age: 80
End: 2025-05-23

## 2025-06-20 ENCOUNTER — APPOINTMENT (OUTPATIENT)
Dept: CARDIOLOGY | Facility: CLINIC | Age: 80
End: 2025-06-20
Payer: MEDICARE

## 2025-06-20 VITALS
HEART RATE: 85 BPM | WEIGHT: 159 LBS | RESPIRATION RATE: 16 BRPM | DIASTOLIC BLOOD PRESSURE: 80 MMHG | TEMPERATURE: 97.6 F | OXYGEN SATURATION: 99 % | SYSTOLIC BLOOD PRESSURE: 128 MMHG | BODY MASS INDEX: 25.55 KG/M2 | HEIGHT: 66 IN

## 2025-06-20 PROCEDURE — 99214 OFFICE O/P EST MOD 30 MIN: CPT

## 2025-06-20 PROCEDURE — G2211 COMPLEX E/M VISIT ADD ON: CPT

## 2025-06-26 ENCOUNTER — OUTPATIENT (OUTPATIENT)
Dept: OUTPATIENT SERVICES | Facility: HOSPITAL | Age: 80
LOS: 1 days | End: 2025-06-26
Payer: MEDICARE

## 2025-06-26 ENCOUNTER — APPOINTMENT (OUTPATIENT)
Dept: MAMMOGRAPHY | Facility: IMAGING CENTER | Age: 80
End: 2025-06-26
Payer: MEDICARE

## 2025-06-26 ENCOUNTER — RESULT REVIEW (OUTPATIENT)
Age: 80
End: 2025-06-26

## 2025-06-26 ENCOUNTER — NON-APPOINTMENT (OUTPATIENT)
Age: 80
End: 2025-06-26

## 2025-06-26 ENCOUNTER — APPOINTMENT (OUTPATIENT)
Dept: ULTRASOUND IMAGING | Facility: IMAGING CENTER | Age: 80
End: 2025-06-26
Payer: MEDICARE

## 2025-06-26 DIAGNOSIS — Z95.1 PRESENCE OF AORTOCORONARY BYPASS GRAFT: Chronic | ICD-10-CM

## 2025-06-26 DIAGNOSIS — R92.30 DENSE BREASTS, UNSPECIFIED: ICD-10-CM

## 2025-06-26 PROCEDURE — 77063 BREAST TOMOSYNTHESIS BI: CPT | Mod: 26

## 2025-06-26 PROCEDURE — 76641 ULTRASOUND BREAST COMPLETE: CPT | Mod: 26,50,GA

## 2025-06-26 PROCEDURE — 76641 ULTRASOUND BREAST COMPLETE: CPT

## 2025-06-26 PROCEDURE — 77067 SCR MAMMO BI INCL CAD: CPT | Mod: 26

## 2025-06-26 PROCEDURE — 77067 SCR MAMMO BI INCL CAD: CPT

## 2025-06-26 PROCEDURE — 77063 BREAST TOMOSYNTHESIS BI: CPT

## 2025-07-03 ENCOUNTER — OUTPATIENT (OUTPATIENT)
Dept: OUTPATIENT SERVICES | Facility: HOSPITAL | Age: 80
LOS: 1 days | End: 2025-07-03
Payer: MEDICARE

## 2025-07-03 ENCOUNTER — APPOINTMENT (OUTPATIENT)
Dept: CT IMAGING | Facility: IMAGING CENTER | Age: 80
End: 2025-07-03
Payer: MEDICARE

## 2025-07-03 DIAGNOSIS — Z98.890 OTHER SPECIFIED POSTPROCEDURAL STATES: Chronic | ICD-10-CM

## 2025-07-03 DIAGNOSIS — Z98.49 CATARACT EXTRACTION STATUS, UNSPECIFIED EYE: Chronic | ICD-10-CM

## 2025-07-03 DIAGNOSIS — Z95.1 PRESENCE OF AORTOCORONARY BYPASS GRAFT: Chronic | ICD-10-CM

## 2025-07-03 DIAGNOSIS — C49.A0 GASTROINTESTINAL STROMAL TUMOR, UNSPECIFIED SITE: ICD-10-CM

## 2025-07-03 PROCEDURE — 74177 CT ABD & PELVIS W/CONTRAST: CPT

## 2025-07-03 PROCEDURE — 74177 CT ABD & PELVIS W/CONTRAST: CPT | Mod: 26

## 2025-07-07 ENCOUNTER — OUTPATIENT (OUTPATIENT)
Dept: OUTPATIENT SERVICES | Facility: HOSPITAL | Age: 80
LOS: 1 days | End: 2025-07-07

## 2025-07-07 ENCOUNTER — APPOINTMENT (OUTPATIENT)
Dept: RADIOLOGY | Facility: IMAGING CENTER | Age: 80
End: 2025-07-07

## 2025-07-07 DIAGNOSIS — Z95.1 PRESENCE OF AORTOCORONARY BYPASS GRAFT: Chronic | ICD-10-CM

## 2025-07-07 DIAGNOSIS — M85.80 OTHER SPECIFIED DISORDERS OF BONE DENSITY AND STRUCTURE, UNSPECIFIED SITE: ICD-10-CM

## 2025-07-07 DIAGNOSIS — Z98.890 OTHER SPECIFIED POSTPROCEDURAL STATES: Chronic | ICD-10-CM

## 2025-07-07 DIAGNOSIS — Z98.49 CATARACT EXTRACTION STATUS, UNSPECIFIED EYE: Chronic | ICD-10-CM

## 2025-07-10 ENCOUNTER — OUTPATIENT (OUTPATIENT)
Dept: OUTPATIENT SERVICES | Facility: HOSPITAL | Age: 80
LOS: 1 days | Discharge: HOME | End: 2025-07-10

## 2025-07-10 DIAGNOSIS — C49.A0 GASTROINTESTINAL STROMAL TUMOR, UNSPECIFIED SITE: ICD-10-CM

## 2025-07-10 DIAGNOSIS — Z98.890 OTHER SPECIFIED POSTPROCEDURAL STATES: Chronic | ICD-10-CM

## 2025-07-10 DIAGNOSIS — Z95.1 PRESENCE OF AORTOCORONARY BYPASS GRAFT: Chronic | ICD-10-CM

## 2025-07-10 DIAGNOSIS — Z98.49 CATARACT EXTRACTION STATUS, UNSPECIFIED EYE: Chronic | ICD-10-CM

## 2025-07-11 ENCOUNTER — APPOINTMENT (OUTPATIENT)
Dept: HEMATOLOGY ONCOLOGY | Facility: CLINIC | Age: 80
End: 2025-07-11
Payer: MEDICARE

## 2025-07-11 VITALS
RESPIRATION RATE: 17 BRPM | TEMPERATURE: 98 F | DIASTOLIC BLOOD PRESSURE: 79 MMHG | OXYGEN SATURATION: 97 % | SYSTOLIC BLOOD PRESSURE: 169 MMHG | HEART RATE: 71 BPM | BODY MASS INDEX: 25.16 KG/M2 | WEIGHT: 155.86 LBS

## 2025-07-11 PROCEDURE — 99213 OFFICE O/P EST LOW 20 MIN: CPT

## 2025-07-11 PROCEDURE — G2211 COMPLEX E/M VISIT ADD ON: CPT

## 2025-07-15 ENCOUNTER — APPOINTMENT (OUTPATIENT)
Dept: SURGICAL ONCOLOGY | Facility: CLINIC | Age: 80
End: 2025-07-15
Payer: MEDICARE

## 2025-07-15 ENCOUNTER — NON-APPOINTMENT (OUTPATIENT)
Age: 80
End: 2025-07-15

## 2025-07-15 VITALS
WEIGHT: 155 LBS | HEART RATE: 83 BPM | HEIGHT: 66 IN | SYSTOLIC BLOOD PRESSURE: 156 MMHG | RESPIRATION RATE: 17 BRPM | DIASTOLIC BLOOD PRESSURE: 77 MMHG | OXYGEN SATURATION: 98 % | BODY MASS INDEX: 24.91 KG/M2

## 2025-07-15 PROCEDURE — 99214 OFFICE O/P EST MOD 30 MIN: CPT

## 2025-07-18 ENCOUNTER — APPOINTMENT (OUTPATIENT)
Dept: RADIOLOGY | Facility: IMAGING CENTER | Age: 80
End: 2025-07-18

## 2025-07-23 ENCOUNTER — OUTPATIENT (OUTPATIENT)
Dept: OUTPATIENT SERVICES | Facility: HOSPITAL | Age: 80
LOS: 1 days | End: 2025-07-23
Payer: MEDICARE

## 2025-07-23 ENCOUNTER — APPOINTMENT (OUTPATIENT)
Dept: RADIOLOGY | Facility: IMAGING CENTER | Age: 80
End: 2025-07-23
Payer: MEDICARE

## 2025-07-23 DIAGNOSIS — Z95.1 PRESENCE OF AORTOCORONARY BYPASS GRAFT: Chronic | ICD-10-CM

## 2025-07-23 DIAGNOSIS — Z98.890 OTHER SPECIFIED POSTPROCEDURAL STATES: Chronic | ICD-10-CM

## 2025-07-23 DIAGNOSIS — M85.80 OTHER SPECIFIED DISORDERS OF BONE DENSITY AND STRUCTURE, UNSPECIFIED SITE: ICD-10-CM

## 2025-07-23 DIAGNOSIS — Z98.49 CATARACT EXTRACTION STATUS, UNSPECIFIED EYE: Chronic | ICD-10-CM

## 2025-07-23 PROCEDURE — 77080 DXA BONE DENSITY AXIAL: CPT | Mod: 26

## 2025-07-23 PROCEDURE — 77080 DXA BONE DENSITY AXIAL: CPT

## 2025-09-05 ENCOUNTER — APPOINTMENT (OUTPATIENT)
Dept: INTERNAL MEDICINE | Facility: CLINIC | Age: 80
End: 2025-09-05
Payer: MEDICARE

## 2025-09-05 VITALS — DIASTOLIC BLOOD PRESSURE: 83 MMHG | SYSTOLIC BLOOD PRESSURE: 132 MMHG

## 2025-09-05 VITALS
HEART RATE: 74 BPM | WEIGHT: 154 LBS | OXYGEN SATURATION: 98 % | SYSTOLIC BLOOD PRESSURE: 138 MMHG | TEMPERATURE: 97.8 F | BODY MASS INDEX: 24.75 KG/M2 | HEIGHT: 66 IN | DIASTOLIC BLOOD PRESSURE: 77 MMHG

## 2025-09-05 DIAGNOSIS — I10 ESSENTIAL (PRIMARY) HYPERTENSION: ICD-10-CM

## 2025-09-05 DIAGNOSIS — Z23 ENCOUNTER FOR IMMUNIZATION: ICD-10-CM

## 2025-09-05 DIAGNOSIS — E11.9 TYPE 2 DIABETES MELLITUS W/OUT COMPLICATIONS: ICD-10-CM

## 2025-09-05 DIAGNOSIS — I25.10 ATHEROSCLEROTIC HEART DISEASE OF NATIVE CORONARY ARTERY W/OUT ANGINA PECTORIS: ICD-10-CM

## 2025-09-05 DIAGNOSIS — E78.5 HYPERLIPIDEMIA, UNSPECIFIED: ICD-10-CM

## 2025-09-05 DIAGNOSIS — D3A.8 OTHER BENIGN NEUROENDOCRINE TUMORS: ICD-10-CM

## 2025-09-05 LAB — HBA1C MFR BLD HPLC: 6

## 2025-09-05 PROCEDURE — G0008: CPT

## 2025-09-05 PROCEDURE — 83036 HEMOGLOBIN GLYCOSYLATED A1C: CPT | Mod: QW

## 2025-09-05 PROCEDURE — 99214 OFFICE O/P EST MOD 30 MIN: CPT

## 2025-09-05 PROCEDURE — 90662 IIV NO PRSV INCREASED AG IM: CPT

## 2025-09-05 PROCEDURE — G2211 COMPLEX E/M VISIT ADD ON: CPT

## 2025-09-18 ENCOUNTER — RX RENEWAL (OUTPATIENT)
Age: 80
End: 2025-09-18

## (undated) DEVICE — CLAMP BX HOT RAD JAW 3

## (undated) DEVICE — TUBING IV SET GRAVITY 3Y 100" MACRO

## (undated) DEVICE — BIOPSY FORCEP COLD DISP

## (undated) DEVICE — DRSG 2X2

## (undated) DEVICE — CONTAINER FORMALIN 80ML YELLOW

## (undated) DEVICE — SALIVA EJECTOR (BLUE)

## (undated) DEVICE — UNDERPAD LINEN SAVER 17 X 24"

## (undated) DEVICE — GOWN LG

## (undated) DEVICE — DRSG BANDAID 0.75X3"

## (undated) DEVICE — BIOPSY FORCEP RADIAL JAW 4 STANDARD WITH NEEDLE

## (undated) DEVICE — FORCEP RADIAL JAW 4 W NDL 2.4MM 2.8MM 240CM ORANGE DISP

## (undated) DEVICE — BASIN EMESIS 10IN GRADUATED MAUVE

## (undated) DEVICE — TUBING MEDI-VAC W MAXIGRIP CONNECTORS 1/4"X6'

## (undated) DEVICE — PACK IV START WITH CHG

## (undated) DEVICE — DRSG CURITY GAUZE SPONGE 4 X 4" 12-PLY NON-STERILE

## (undated) DEVICE — ELCTR ECG CONDUCTIVE ADHESIVE

## (undated) DEVICE — LINE BREATHE SAMPLNG

## (undated) DEVICE — CATH BLLN ULTRASONIC ENSOSCOPE

## (undated) DEVICE — LUBRICATING JELLY HR ONE SHOT 3G

## (undated) DEVICE — BITE BLOCK ADULT 20 X 27MM (GREEN)

## (undated) DEVICE — CATH IV SAFE BC 22G X 1" (BLUE)

## (undated) DEVICE — DENTURE CUP PINK

## (undated) DEVICE — SNARE EXACTO COLD 9MMX230CM

## (undated) DEVICE — POLY TRAP ETRAP